# Patient Record
Sex: FEMALE | Race: BLACK OR AFRICAN AMERICAN | Employment: OTHER | URBAN - METROPOLITAN AREA
[De-identification: names, ages, dates, MRNs, and addresses within clinical notes are randomized per-mention and may not be internally consistent; named-entity substitution may affect disease eponyms.]

---

## 2017-02-13 ENCOUNTER — TELEPHONE (OUTPATIENT)
Dept: INTERNAL MEDICINE CLINIC | Facility: CLINIC | Age: 72
End: 2017-02-13

## 2017-02-13 NOTE — TELEPHONE ENCOUNTER
Pt is eligible for a Medicare Annual Health Assessment. Discussed in detail w/patient. Appt made for 3/22/17.

## 2017-03-22 ENCOUNTER — OFFICE VISIT (OUTPATIENT)
Dept: INTERNAL MEDICINE CLINIC | Facility: CLINIC | Age: 72
End: 2017-03-22

## 2017-03-22 VITALS
SYSTOLIC BLOOD PRESSURE: 126 MMHG | BODY MASS INDEX: 29.01 KG/M2 | DIASTOLIC BLOOD PRESSURE: 78 MMHG | HEIGHT: 67.5 IN | TEMPERATURE: 98 F | WEIGHT: 187 LBS | HEART RATE: 52 BPM

## 2017-03-22 DIAGNOSIS — N18.30 CKD (CHRONIC KIDNEY DISEASE) STAGE 3, GFR 30-59 ML/MIN (HCC): ICD-10-CM

## 2017-03-22 DIAGNOSIS — Z00.00 ANNUAL PHYSICAL EXAM: Primary | ICD-10-CM

## 2017-03-22 DIAGNOSIS — L85.3 DRY SKIN DERMATITIS: ICD-10-CM

## 2017-03-22 DIAGNOSIS — K21.9 GASTROESOPHAGEAL REFLUX DISEASE, ESOPHAGITIS PRESENCE NOT SPECIFIED: ICD-10-CM

## 2017-03-22 DIAGNOSIS — E78.5 HYPERLIPIDEMIA LDL GOAL <70: ICD-10-CM

## 2017-03-22 DIAGNOSIS — Z00.01 ENCOUNTER FOR GENERAL ADULT MEDICAL EXAMINATION WITH ABNORMAL FINDINGS: ICD-10-CM

## 2017-03-22 PROCEDURE — 90670 PCV13 VACCINE IM: CPT | Performed by: INTERNAL MEDICINE

## 2017-03-22 PROCEDURE — 96160 PT-FOCUSED HLTH RISK ASSMT: CPT | Performed by: INTERNAL MEDICINE

## 2017-03-22 PROCEDURE — G0438 PPPS, INITIAL VISIT: HCPCS | Performed by: INTERNAL MEDICINE

## 2017-03-22 PROCEDURE — G0009 ADMIN PNEUMOCOCCAL VACCINE: HCPCS | Performed by: INTERNAL MEDICINE

## 2017-03-22 NOTE — PROGRESS NOTES
HPI:    Patient ID: Yulissa Diehl is a 67year old female. The patient arrives for an annual wellness evaluation (Medicare). HPI  Hyperlipidemia  This is a chronic problem. The current episode started more than 1 year ago.  Lipid results: Lipid panel on syncope and speech difficulty. Psychiatric/Behavioral: Negative for confusion. The patient is not nervous/anxious.       HISTORY:  Past Medical History   Diagnosis Date   • Hyperlipidemia    • Colon polyp    • High cholesterol    • Deep vein thrombosis (H friction rub. No murmur heard. Pulmonary/Chest: Effort normal and breath sounds normal. No respiratory distress. She has no wheezes. She has no rales. Abdominal: Soft. Bowel sounds are normal. She exhibits no distension and no mass.  There is no tende diagnosis)  The patient arrives for an annual wellness evaluation (Medicare). The patient currently exercises 3-4 times per week and denies any CP or SOB on exertion.    Plan:   - Return in one year for annual wellness check.         (E78.5) Hyperlipidemia Cream        Meds This Visit:  Signed Prescriptions Disp Refills    triamcinolone acetonide 0.1 % External Cream 45 g 1      Sig: Apply topically 2 (two) times daily as needed.        Imaging & Referrals:  NEPHROLOGY - INTERNAL     TG#[id number]    [facility] issues?: 0-No    Fall/Risk Scorin    Scoring Interpretation: 0 - 3 No Risk     Depression Screening (PHQ-2/PHQ-9): Over the LAST 2 WEEKS   Little interest or pleasure in doing things (over the last two weeks)?: Not at all    Feeling down, depressed, or of the week is this?: Correct    What month is it?: Correct    What year is it?: Correct    Recall \"Ball\": Correct    Recall \"Flag\": Correct    Recall \"Tree\": Correct      Kendal Abrams's SCREENING SCHEDULE   Tests on this list are recommended by you Immunization Activity if applicable    Pneumococcal No orders found for this or any previous visit. Update Immunization Activity if applicable    Hepatitis B No orders found for this or any previous visit.  Update Immunization Activity if applicable    Teta 1:43 PM

## 2017-04-18 ENCOUNTER — APPOINTMENT (OUTPATIENT)
Dept: LAB | Facility: HOSPITAL | Age: 72
End: 2017-04-18
Attending: INTERNAL MEDICINE
Payer: MEDICARE

## 2017-04-18 DIAGNOSIS — E78.5 HYPERLIPIDEMIA LDL GOAL <70: ICD-10-CM

## 2017-04-18 PROCEDURE — 80061 LIPID PANEL: CPT

## 2017-04-18 PROCEDURE — 80053 COMPREHEN METABOLIC PANEL: CPT

## 2017-04-18 PROCEDURE — 36415 COLL VENOUS BLD VENIPUNCTURE: CPT

## 2017-04-24 ENCOUNTER — OFFICE VISIT (OUTPATIENT)
Dept: NEPHROLOGY | Facility: CLINIC | Age: 72
End: 2017-04-24

## 2017-04-24 VITALS
SYSTOLIC BLOOD PRESSURE: 111 MMHG | WEIGHT: 187 LBS | HEART RATE: 62 BPM | HEIGHT: 66.75 IN | BODY MASS INDEX: 29.35 KG/M2 | DIASTOLIC BLOOD PRESSURE: 62 MMHG

## 2017-04-24 DIAGNOSIS — N28.1 RENAL CYST: Primary | ICD-10-CM

## 2017-04-24 DIAGNOSIS — M85.80 OSTEOPENIA, UNSPECIFIED LOCATION: ICD-10-CM

## 2017-04-24 PROCEDURE — G0463 HOSPITAL OUTPT CLINIC VISIT: HCPCS | Performed by: INTERNAL MEDICINE

## 2017-04-24 PROCEDURE — 99204 OFFICE O/P NEW MOD 45 MIN: CPT | Performed by: INTERNAL MEDICINE

## 2017-04-24 NOTE — PATIENT INSTRUCTIONS
1.. Please do ultrasound of kidneys  In July and call me    2. Do blood test and urine the same day      3. . Don't worry about anything. You look great    4. Nice to meet you Kendal!

## 2017-04-25 NOTE — PROGRESS NOTES
NEPHROLOGY PROGRESS NOTE  Issac Moraes     MRN:  [id number]   Date of Service:  4/24/17     I had the pleasure of seeing Josephine Rodriguez in the office today. Thank you for the very kind referral. 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  She was in done of her kidneys in September 2016, liver has some hemangiomas, gallbladder looks okay, pancreas looks okay. She had two small cysts in her right kidney. Her left kidney had a large complex 5 x 5 cm parapelvic cyst. It appeared benign.      IMPRESSION:

## 2017-06-12 ENCOUNTER — TELEPHONE (OUTPATIENT)
Dept: INTERNAL MEDICINE CLINIC | Facility: CLINIC | Age: 72
End: 2017-06-12

## 2017-06-12 DIAGNOSIS — M20.41 HAMMERTOES OF BOTH FEET: Primary | ICD-10-CM

## 2017-06-12 DIAGNOSIS — M20.42 HAMMERTOES OF BOTH FEET: Primary | ICD-10-CM

## 2017-06-12 NOTE — TELEPHONE ENCOUNTER
Pt would like to see a podiatry dr.  Pt has jr and would like to get it straightened out.   Please advise

## 2017-06-26 ENCOUNTER — OFFICE VISIT (OUTPATIENT)
Dept: INTERNAL MEDICINE CLINIC | Facility: CLINIC | Age: 72
End: 2017-06-26

## 2017-06-26 VITALS
HEART RATE: 62 BPM | DIASTOLIC BLOOD PRESSURE: 72 MMHG | WEIGHT: 186.88 LBS | BODY MASS INDEX: 29 KG/M2 | TEMPERATURE: 98 F | SYSTOLIC BLOOD PRESSURE: 109 MMHG

## 2017-06-26 DIAGNOSIS — N89.8 VAGINAL DISCHARGE: Primary | ICD-10-CM

## 2017-06-26 DIAGNOSIS — L85.3 DRY SKIN DERMATITIS: ICD-10-CM

## 2017-06-26 PROCEDURE — 99214 OFFICE O/P EST MOD 30 MIN: CPT | Performed by: INTERNAL MEDICINE

## 2017-06-26 PROCEDURE — G0463 HOSPITAL OUTPT CLINIC VISIT: HCPCS | Performed by: INTERNAL MEDICINE

## 2017-06-26 NOTE — PROGRESS NOTES
HPI:    Patient ID: Kim Smith is a 67year old female. HPI     Patient presents to the clinic for swelling in the vaginal area. She states discomfort when drying herself and has not had any discharge nor is she sexually active.  This is the first occ mouth daily. Disp:  Rfl:    Omeprazole 40 MG Oral Capsule Delayed Release Take 40 mg by mouth daily. Disp:  Rfl:    Atorvastatin Calcium 20 MG Oral Tab Take 1 tablet (20 mg total) by mouth nightly.  Disp: 90 tablet Rfl: 3     Allergies:No Known Allergies described in this documentation. All medical record entries made by the scribe were at my direction and in my presence.   I have reviewed the chart and discharge instructions (if applicable) and agree that the record reflects my personal performance and is

## 2017-07-11 ENCOUNTER — OFFICE VISIT (OUTPATIENT)
Dept: PODIATRY CLINIC | Facility: CLINIC | Age: 72
End: 2017-07-11

## 2017-07-11 ENCOUNTER — HOSPITAL ENCOUNTER (OUTPATIENT)
Dept: GENERAL RADIOLOGY | Facility: HOSPITAL | Age: 72
Discharge: HOME | End: 2017-07-11
Attending: PODIATRIST
Payer: MEDICARE

## 2017-07-11 ENCOUNTER — HOSPITAL ENCOUNTER (OUTPATIENT)
Dept: ULTRASOUND IMAGING | Facility: HOSPITAL | Age: 72
Discharge: HOME | End: 2017-07-11
Attending: INTERNAL MEDICINE
Payer: MEDICARE

## 2017-07-11 ENCOUNTER — APPOINTMENT (OUTPATIENT)
Dept: LAB | Facility: HOSPITAL | Age: 72
End: 2017-07-11
Attending: INTERNAL MEDICINE
Payer: MEDICARE

## 2017-07-11 DIAGNOSIS — M85.80 OSTEOPENIA, UNSPECIFIED LOCATION: ICD-10-CM

## 2017-07-11 DIAGNOSIS — M20.41 HAMMER TOE OF RIGHT FOOT: ICD-10-CM

## 2017-07-11 DIAGNOSIS — M20.41 HAMMER TOE OF RIGHT FOOT: Primary | ICD-10-CM

## 2017-07-11 DIAGNOSIS — N28.1 RENAL CYST: ICD-10-CM

## 2017-07-11 LAB
ANION GAP SERPL CALC-SCNC: 5 MMOL/L (ref 0–18)
BASOPHILS # BLD: 0 K/UL (ref 0–0.2)
BASOPHILS NFR BLD: 1 %
BILIRUB UR QL: NEGATIVE
BUN SERPL-MCNC: 17 MG/DL (ref 8–20)
BUN/CREAT SERPL: 13.3 (ref 10–20)
CALCIUM SERPL-MCNC: 9 MG/DL (ref 8.5–10.5)
CHLORIDE SERPL-SCNC: 109 MMOL/L (ref 95–110)
CO2 SERPL-SCNC: 26 MMOL/L (ref 22–32)
COLOR UR: YELLOW
CREAT SERPL-MCNC: 1.28 MG/DL (ref 0.5–1.5)
CREAT UR-MCNC: 349.8 MG/DL
EOSINOPHIL # BLD: 0.1 K/UL (ref 0–0.7)
EOSINOPHIL NFR BLD: 2 %
ERYTHROCYTE [DISTWIDTH] IN BLOOD BY AUTOMATED COUNT: 13.3 % (ref 11–15)
GLUCOSE SERPL-MCNC: 90 MG/DL (ref 70–99)
GLUCOSE UR-MCNC: NEGATIVE MG/DL
HCT VFR BLD AUTO: 38.8 % (ref 35–48)
HGB BLD-MCNC: 12.9 G/DL (ref 12–16)
KETONES UR-MCNC: NEGATIVE MG/DL
LYMPHOCYTES # BLD: 1.1 K/UL (ref 1–4)
LYMPHOCYTES NFR BLD: 37 %
MCH RBC QN AUTO: 30.5 PG (ref 27–32)
MCHC RBC AUTO-ENTMCNC: 33.3 G/DL (ref 32–37)
MCV RBC AUTO: 91.7 FL (ref 80–100)
MICROALBUMIN UR-MCNC: 5 MG/DL (ref 0–1.8)
MICROALBUMIN/CREAT UR: 14.3 MG/G{CREAT} (ref 0–20)
MONOCYTES # BLD: 0.3 K/UL (ref 0–1)
MONOCYTES NFR BLD: 9 %
NEUTROPHILS # BLD AUTO: 1.6 K/UL (ref 1.8–7.7)
NEUTROPHILS NFR BLD: 51 %
NITRITE UR QL STRIP.AUTO: NEGATIVE
OSMOLALITY UR CALC.SUM OF ELEC: 291 MOSM/KG (ref 275–295)
PH UR: 5 [PH] (ref 5–8)
PLATELET # BLD AUTO: 190 K/UL (ref 140–400)
PMV BLD AUTO: 8.6 FL (ref 7.4–10.3)
POTASSIUM SERPL-SCNC: 4.6 MMOL/L (ref 3.3–5.1)
PROT UR-MCNC: 30 MG/DL
RBC # BLD AUTO: 4.23 M/UL (ref 3.7–5.4)
RBC #/AREA URNS AUTO: 9 /HPF
SODIUM SERPL-SCNC: 140 MMOL/L (ref 136–144)
SP GR UR STRIP: 1.03 (ref 1–1.03)
UROBILINOGEN UR STRIP-ACNC: 2
VIT C UR-MCNC: NEGATIVE MG/DL
WBC # BLD AUTO: 3.1 K/UL (ref 4–11)
WBC #/AREA URNS AUTO: 66 /HPF

## 2017-07-11 PROCEDURE — 81001 URINALYSIS AUTO W/SCOPE: CPT | Performed by: INTERNAL MEDICINE

## 2017-07-11 PROCEDURE — 99213 OFFICE O/P EST LOW 20 MIN: CPT | Performed by: PODIATRIST

## 2017-07-11 PROCEDURE — G0463 HOSPITAL OUTPT CLINIC VISIT: HCPCS | Performed by: PODIATRIST

## 2017-07-11 PROCEDURE — 82306 VITAMIN D 25 HYDROXY: CPT

## 2017-07-11 PROCEDURE — 85025 COMPLETE CBC W/AUTO DIFF WBC: CPT | Performed by: INTERNAL MEDICINE

## 2017-07-11 PROCEDURE — 73630 X-RAY EXAM OF FOOT: CPT | Performed by: PODIATRIST

## 2017-07-11 PROCEDURE — 80048 BASIC METABOLIC PNL TOTAL CA: CPT | Performed by: INTERNAL MEDICINE

## 2017-07-11 PROCEDURE — 82570 ASSAY OF URINE CREATININE: CPT

## 2017-07-11 PROCEDURE — 82043 UR ALBUMIN QUANTITATIVE: CPT

## 2017-07-11 PROCEDURE — 76770 US EXAM ABDO BACK WALL COMP: CPT | Performed by: INTERNAL MEDICINE

## 2017-07-11 PROCEDURE — 36415 COLL VENOUS BLD VENIPUNCTURE: CPT

## 2017-07-12 ENCOUNTER — TELEPHONE (OUTPATIENT)
Dept: PODIATRY CLINIC | Facility: CLINIC | Age: 72
End: 2017-07-12

## 2017-07-12 DIAGNOSIS — M20.41 HAMMER TOE OF RIGHT FOOT: Primary | ICD-10-CM

## 2017-07-12 LAB — 25(OH)D3 SERPL-MCNC: 17.2 NG/ML

## 2017-07-12 NOTE — TELEPHONE ENCOUNTER
Spoke to pt and scheduled surgery with SCR at [facility] for 08/04/17.    Informed pt that Casi be calling pt 1-2 days prior to surgery to discuss the following:  - Time of surgery and when to be there  - Medications and allergies  - Medical History and Surgi

## 2017-08-10 ENCOUNTER — TELEPHONE (OUTPATIENT)
Dept: NEPHROLOGY | Facility: CLINIC | Age: 72
End: 2017-08-10

## 2017-08-11 NOTE — TELEPHONE ENCOUNTER
notifed pt of stable cysts in kidney and stable labs   She is to see me in jan 2017  And do labs December. .   As her vaginal problem is not better.  Recommended David Bauer of obgyn to evaluate     Discussed all with her on phone  She has ckd3   thanks fo

## 2017-08-14 ENCOUNTER — OFFICE VISIT (OUTPATIENT)
Dept: PODIATRY CLINIC | Facility: CLINIC | Age: 72
End: 2017-08-14

## 2017-08-14 DIAGNOSIS — M20.41 HAMMER TOE OF RIGHT FOOT: Primary | ICD-10-CM

## 2017-08-14 PROCEDURE — 99024 POSTOP FOLLOW-UP VISIT: CPT | Performed by: PODIATRIST

## 2017-08-14 PROCEDURE — G0463 HOSPITAL OUTPT CLINIC VISIT: HCPCS | Performed by: PODIATRIST

## 2017-08-14 RX ORDER — MELATONIN
1000 DAILY
COMMUNITY

## 2017-08-14 RX ORDER — ACETAMINOPHEN AND CODEINE PHOSPHATE 300; 30 MG/1; MG/1
TABLET ORAL
Refills: 0 | COMMUNITY
Start: 2017-08-04 | End: 2017-08-21

## 2017-08-14 NOTE — PROGRESS NOTES
HPI:    Patient ID: Sparkle Villalobos is a 67year old female. HPI  76-year-old female presents approximately 1 week post hammertoe surgery with no specific noted complaints.   Review of Systems         Current Outpatient Prescriptions:  Vitamin D3 1000 unit

## 2017-08-21 ENCOUNTER — OFFICE VISIT (OUTPATIENT)
Dept: PODIATRY CLINIC | Facility: CLINIC | Age: 72
End: 2017-08-21

## 2017-08-21 ENCOUNTER — HOSPITAL ENCOUNTER (OUTPATIENT)
Dept: GENERAL RADIOLOGY | Facility: HOSPITAL | Age: 72
Discharge: HOME | End: 2017-08-21
Attending: PODIATRIST
Payer: MEDICARE

## 2017-08-21 DIAGNOSIS — Z47.89 ORTHOPEDIC AFTERCARE: Primary | ICD-10-CM

## 2017-08-21 DIAGNOSIS — Z47.89 ORTHOPEDIC AFTERCARE: ICD-10-CM

## 2017-08-21 DIAGNOSIS — M20.41 HAMMER TOE OF RIGHT FOOT: ICD-10-CM

## 2017-08-21 PROCEDURE — G0463 HOSPITAL OUTPT CLINIC VISIT: HCPCS | Performed by: PODIATRIST

## 2017-08-21 PROCEDURE — 73630 X-RAY EXAM OF FOOT: CPT | Performed by: PODIATRIST

## 2017-08-21 PROCEDURE — 99024 POSTOP FOLLOW-UP VISIT: CPT | Performed by: PODIATRIST

## 2017-08-21 NOTE — PROGRESS NOTES
HPI:    Patient ID: Princess Vasquez is a 67year old female. HPI  This 79-year-old female presents 2 weeks post right forefoot surgery with no specific noted complaints or concerns.   Review of Systems         Current Outpatient Prescriptions:  Vitamin D3

## 2017-08-23 ENCOUNTER — OFFICE VISIT (OUTPATIENT)
Dept: OBGYN CLINIC | Facility: CLINIC | Age: 72
End: 2017-08-23

## 2017-08-23 VITALS — DIASTOLIC BLOOD PRESSURE: 70 MMHG | SYSTOLIC BLOOD PRESSURE: 115 MMHG | BODY MASS INDEX: 29 KG/M2 | WEIGHT: 185 LBS

## 2017-08-23 DIAGNOSIS — N90.89 VULVAR IRRITATION: Primary | ICD-10-CM

## 2017-08-23 DIAGNOSIS — Z12.31 VISIT FOR SCREENING MAMMOGRAM: ICD-10-CM

## 2017-08-23 PROCEDURE — 99202 OFFICE O/P NEW SF 15 MIN: CPT | Performed by: ADVANCED PRACTICE MIDWIFE

## 2017-08-23 NOTE — PROGRESS NOTES
HPI:    Patient ID: Samantha Dodd is a 67year old female. For over a month has had a very dry labia. Sometime some discomfort but mostly the dryness is the problem. Denies rash, vaginal discharge. Is not sexually active.   Has not done anything to tr Recommend nothing but water to perineal area, no soap, perfumes or scrubbing. To use cotton underwear only without dyed fabric. 3.  Thin coat of Coconut oil (not lotion with perfumes) to area twice daily  4.   If no relief in 1 month RTC for biopsy    O

## 2017-08-25 DIAGNOSIS — B37.9 YEAST INFECTION: Primary | ICD-10-CM

## 2017-08-25 LAB
GENITAL VAGINOSIS SCREEN: NEGATIVE
TRICHOMONAS SCREEN: NEGATIVE

## 2017-08-28 ENCOUNTER — TELEPHONE (OUTPATIENT)
Dept: OBGYN CLINIC | Facility: CLINIC | Age: 72
End: 2017-08-28

## 2017-08-28 ENCOUNTER — OFFICE VISIT (OUTPATIENT)
Dept: PODIATRY CLINIC | Facility: CLINIC | Age: 72
End: 2017-08-28

## 2017-08-28 DIAGNOSIS — B37.9 YEAST INFECTION: ICD-10-CM

## 2017-08-28 DIAGNOSIS — M20.41 HAMMER TOE OF RIGHT FOOT: Primary | ICD-10-CM

## 2017-08-28 PROCEDURE — 99024 POSTOP FOLLOW-UP VISIT: CPT | Performed by: PODIATRIST

## 2017-08-28 PROCEDURE — G0463 HOSPITAL OUTPT CLINIC VISIT: HCPCS | Performed by: PODIATRIST

## 2017-08-28 NOTE — TELEPHONE ENCOUNTER
----- Message from SUPA Pozo sent at 8/25/2017  1:01 PM CDT -----  Please let this patient know that she has a vaginal yeast infection and this is probably what is causing her external itching as well.   I will send an order for terazol 7 tell her

## 2017-08-28 NOTE — PROGRESS NOTES
HPI:    Patient ID: Mitchell Saleh is a 67year old female. HPI  This 76-year-old female presents 1 month post hammertoe surgery right. She is having no specific complaints or noted concerns.   Review of Systems         Current Outpatient Prescriptions:

## 2017-09-25 ENCOUNTER — OFFICE VISIT (OUTPATIENT)
Dept: PODIATRY CLINIC | Facility: CLINIC | Age: 72
End: 2017-09-25

## 2017-09-25 DIAGNOSIS — M20.41 HAMMER TOE OF RIGHT FOOT: Primary | ICD-10-CM

## 2017-09-25 PROCEDURE — 99024 POSTOP FOLLOW-UP VISIT: CPT | Performed by: PODIATRIST

## 2017-09-25 PROCEDURE — G0463 HOSPITAL OUTPT CLINIC VISIT: HCPCS | Performed by: PODIATRIST

## 2017-09-25 NOTE — PROGRESS NOTES
HPI:    Patient ID: Dima Ruby is a 67year old female. HPI  59-year-old female presents postsurgical with no noted complaints. She is wearing closed shoes and is returned to normal weightbearing activities without restriction.   No further care as n

## 2017-10-10 ENCOUNTER — TELEPHONE (OUTPATIENT)
Dept: INTERNAL MEDICINE CLINIC | Facility: CLINIC | Age: 72
End: 2017-10-10

## 2017-10-10 DIAGNOSIS — Z12.39 BREAST CANCER SCREENING: Primary | ICD-10-CM

## 2017-10-10 NOTE — TELEPHONE ENCOUNTER
Patient need order for mammogram.  Call when ready for      Patient has apt next wed  At [facility]

## 2017-11-05 PROBLEM — N90.89 VULVAR IRRITATION: Status: RESOLVED | Noted: 2017-08-23 | Resolved: 2017-11-05

## 2017-11-07 ENCOUNTER — OFFICE VISIT (OUTPATIENT)
Dept: INTERNAL MEDICINE CLINIC | Facility: CLINIC | Age: 72
End: 2017-11-07

## 2017-11-07 VITALS
HEIGHT: 66.75 IN | SYSTOLIC BLOOD PRESSURE: 117 MMHG | WEIGHT: 188 LBS | DIASTOLIC BLOOD PRESSURE: 70 MMHG | BODY MASS INDEX: 29.51 KG/M2 | HEART RATE: 62 BPM

## 2017-11-07 DIAGNOSIS — Z86.010 HISTORY OF COLON POLYPS: ICD-10-CM

## 2017-11-07 DIAGNOSIS — E78.00 HYPERCHOLESTEROLEMIA: Primary | ICD-10-CM

## 2017-11-07 DIAGNOSIS — K21.9 GASTROESOPHAGEAL REFLUX DISEASE WITHOUT ESOPHAGITIS: ICD-10-CM

## 2017-11-07 PROCEDURE — G0008 ADMIN INFLUENZA VIRUS VAC: HCPCS | Performed by: INTERNAL MEDICINE

## 2017-11-07 PROCEDURE — 90653 IIV ADJUVANT VACCINE IM: CPT | Performed by: INTERNAL MEDICINE

## 2017-11-07 PROCEDURE — G0463 HOSPITAL OUTPT CLINIC VISIT: HCPCS | Performed by: INTERNAL MEDICINE

## 2017-11-07 PROCEDURE — 99214 OFFICE O/P EST MOD 30 MIN: CPT | Performed by: INTERNAL MEDICINE

## 2017-11-07 NOTE — H&P
Lida Slade is a 67year old female who presents this morning to establish ongoing primary care. HPI:   Previous PCP was Dr. Raysa Valdez, who recently left his practice. She feels well today. She does request a high-dose flu shot today.   No other speci III (moderate)    • Deep vein thrombosis (HCC) 2000s    RLE, treated with Coumadin for 6 months   • GERD (gastroesophageal reflux disease)     EGD 11-16 with hiatal hernia and gastritis with biopsies benign and H. pylori testing negative   • History of col auscultation  CARDIAC: Rhythm regular S1 S2 normal without murmur or edema  ABDOMEN: Bowel sounds normal soft nontender without mass or hepatosplenomegaly  EXTREMITIES: Normal without cyanosis or clubbing  PULSES: Carotid upstrokes 2+ without carotid bruit

## 2017-11-07 NOTE — PATIENT INSTRUCTIONS
You received a flu shot today. Please resume taking atorvastatin 20 mg daily.   Return visit in March for recheck, physical, labs and second Pneumococcal vaccine

## 2017-12-29 ENCOUNTER — OFFICE VISIT (OUTPATIENT)
Dept: INTERNAL MEDICINE CLINIC | Facility: CLINIC | Age: 72
End: 2017-12-29

## 2017-12-29 VITALS
DIASTOLIC BLOOD PRESSURE: 62 MMHG | HEIGHT: 67 IN | SYSTOLIC BLOOD PRESSURE: 110 MMHG | HEART RATE: 72 BPM | RESPIRATION RATE: 16 BRPM | WEIGHT: 187.13 LBS | TEMPERATURE: 97 F | BODY MASS INDEX: 29.37 KG/M2

## 2017-12-29 DIAGNOSIS — J06.9 UPPER RESPIRATORY TRACT INFECTION, UNSPECIFIED TYPE: Primary | ICD-10-CM

## 2017-12-29 PROCEDURE — 99213 OFFICE O/P EST LOW 20 MIN: CPT | Performed by: PHYSICIAN ASSISTANT

## 2017-12-29 RX ORDER — FLUTICASONE PROPIONATE 50 MCG
2 SPRAY, SUSPENSION (ML) NASAL DAILY
Qty: 1 BOTTLE | Refills: 1 | Status: SHIPPED | OUTPATIENT
Start: 2017-12-29 | End: 2018-03-05 | Stop reason: ALTCHOICE

## 2017-12-29 RX ORDER — AZITHROMYCIN 250 MG/1
TABLET, FILM COATED ORAL
Qty: 6 TABLET | Refills: 0 | Status: SHIPPED | OUTPATIENT
Start: 2017-12-29 | End: 2018-03-05 | Stop reason: ALTCHOICE

## 2017-12-29 NOTE — PATIENT INSTRUCTIONS
Azithromycin - take two tablets on the first day, then one tablet daily until finished  Flonase - 2 sprays in each nostril once daily until symptoms improve then can stop use  Mucinex or Robitussin as needed for cough and congestion  Tylenol every 4-6 hour

## 2017-12-29 NOTE — PROGRESS NOTES
Mitchell Saleh is a 67year old female. Patient presents with:  Cough  Night Sweats      HPI:   Patient presents today complaining of upper respiratory symptoms for over 1 month.   Symptoms have been waxing and waning since onset but worse in the last sever 2014    [facility], [facility]   • Egd N/A 11/17/2016    Procedure: ESOPHAGOGASTRODUODENOSCOPY (EGD);   Surgeon: Aravind Issa MD;  Location: [address] [place] [address] ENDOSCOPY   • Hemorrhoidectomy  1980s   • Knee arthroscopy Right 1997   • Other  1995    Left salp wheezes, rales, or rhonchi. CARDIO: Regular rate and rhythm. No murmur, rubs, or gallops. No edema. NUERO: Normal mood and affect.      ASSESSMENT AND PLAN:   Diagnoses and all orders for this visit:    Upper respiratory tract infection, unspecified type

## 2018-01-03 ENCOUNTER — TELEPHONE (OUTPATIENT)
Dept: INTERNAL MEDICINE CLINIC | Facility: CLINIC | Age: 73
End: 2018-01-03

## 2018-01-03 DIAGNOSIS — H52.209 ASTIGMATISM, UNSPECIFIED LATERALITY, UNSPECIFIED TYPE: Primary | ICD-10-CM

## 2018-01-03 NOTE — TELEPHONE ENCOUNTER
Pt called and requested a referral to see a Dr for her annual eye exam.  She requested a referral to go to a [facility].    I explained to her to use her vision insurance for that type of request.  She requested the referral to be placed to use her m

## 2018-01-06 ENCOUNTER — APPOINTMENT (OUTPATIENT)
Dept: GENERAL RADIOLOGY | Facility: HOSPITAL | Age: 73
End: 2018-01-06
Attending: EMERGENCY MEDICINE
Payer: MEDICARE

## 2018-01-06 ENCOUNTER — HOSPITAL ENCOUNTER (EMERGENCY)
Facility: HOSPITAL | Age: 73
Discharge: HOME | End: 2018-01-06
Attending: EMERGENCY MEDICINE
Payer: MEDICARE

## 2018-01-06 VITALS
OXYGEN SATURATION: 99 % | SYSTOLIC BLOOD PRESSURE: 116 MMHG | HEART RATE: 51 BPM | DIASTOLIC BLOOD PRESSURE: 63 MMHG | BODY MASS INDEX: 28.54 KG/M2 | HEIGHT: 67.5 IN | WEIGHT: 184 LBS | TEMPERATURE: 97 F | RESPIRATION RATE: 20 BRPM

## 2018-01-06 DIAGNOSIS — R19.7 DIARRHEA, UNSPECIFIED TYPE: Primary | ICD-10-CM

## 2018-01-06 LAB
ALBUMIN SERPL BCP-MCNC: 3.8 G/DL (ref 3.5–4.8)
ALP SERPL-CCNC: 47 U/L (ref 32–100)
ANION GAP SERPL CALC-SCNC: 9 MMOL/L (ref 0–18)
BASOPHILS # BLD: 0 K/UL (ref 0–0.2)
BASOPHILS NFR BLD: 1 %
BILIRUB UR QL: NEGATIVE
BUN SERPL-MCNC: 19 MG/DL (ref 8–20)
BUN/CREAT SERPL: 15 (ref 10–20)
CALCIUM SERPL-MCNC: 8.5 MG/DL (ref 8.5–10.5)
CHLORIDE SERPL-SCNC: 106 MMOL/L (ref 95–110)
CO2 SERPL-SCNC: 24 MMOL/L (ref 22–32)
COLOR UR: YELLOW
CREAT SERPL-MCNC: 1.27 MG/DL (ref 0.5–1.5)
EOSINOPHIL # BLD: 0.1 K/UL (ref 0–0.7)
EOSINOPHIL NFR BLD: 3 %
ERYTHROCYTE [DISTWIDTH] IN BLOOD BY AUTOMATED COUNT: 13.4 % (ref 11–15)
GLUCOSE SERPL-MCNC: 101 MG/DL (ref 70–99)
GLUCOSE UR-MCNC: NEGATIVE MG/DL
HCT VFR BLD AUTO: 40.9 % (ref 35–48)
HGB BLD-MCNC: 13.5 G/DL (ref 12–16)
HGB UR QL STRIP.AUTO: NEGATIVE
KETONES UR-MCNC: NEGATIVE MG/DL
LEUKOCYTE ESTERASE UR QL STRIP.AUTO: NEGATIVE
LYMPHOCYTES # BLD: 1.3 K/UL (ref 1–4)
LYMPHOCYTES NFR BLD: 41 %
MCH RBC QN AUTO: 30.1 PG (ref 27–32)
MCHC RBC AUTO-ENTMCNC: 33 G/DL (ref 32–37)
MCV RBC AUTO: 91.1 FL (ref 80–100)
MONOCYTES # BLD: 0.3 K/UL (ref 0–1)
MONOCYTES NFR BLD: 10 %
NEUTROPHILS # BLD AUTO: 1.5 K/UL (ref 1.8–7.7)
NEUTROPHILS NFR BLD: 45 %
NITRITE UR QL STRIP.AUTO: NEGATIVE
OSMOLALITY UR CALC.SUM OF ELEC: 290 MOSM/KG (ref 275–295)
PH UR: 5 [PH] (ref 5–8)
PLATELET # BLD AUTO: 212 K/UL (ref 140–400)
PMV BLD AUTO: 8.6 FL (ref 7.4–10.3)
PROT SERPL-MCNC: 7.2 G/DL (ref 5.9–8.4)
PROT UR-MCNC: NEGATIVE MG/DL
RBC # BLD AUTO: 4.49 M/UL (ref 3.7–5.4)
RBC #/AREA URNS AUTO: 6 /HPF
SODIUM SERPL-SCNC: 139 MMOL/L (ref 136–144)
SP GR UR STRIP: 1.03 (ref 1–1.03)
UROBILINOGEN UR STRIP-ACNC: 2
VIT C UR-MCNC: 40 MG/DL
WBC # BLD AUTO: 3.2 K/UL (ref 4–11)
WBC #/AREA URNS AUTO: 3 /HPF

## 2018-01-06 PROCEDURE — 80076 HEPATIC FUNCTION PANEL: CPT | Performed by: EMERGENCY MEDICINE

## 2018-01-06 PROCEDURE — 96360 HYDRATION IV INFUSION INIT: CPT

## 2018-01-06 PROCEDURE — 99284 EMERGENCY DEPT VISIT MOD MDM: CPT

## 2018-01-06 PROCEDURE — 80048 BASIC METABOLIC PNL TOTAL CA: CPT | Performed by: EMERGENCY MEDICINE

## 2018-01-06 PROCEDURE — 85025 COMPLETE CBC W/AUTO DIFF WBC: CPT | Performed by: EMERGENCY MEDICINE

## 2018-01-06 PROCEDURE — 71046 X-RAY EXAM CHEST 2 VIEWS: CPT | Performed by: EMERGENCY MEDICINE

## 2018-01-06 PROCEDURE — 81003 URINALYSIS AUTO W/O SCOPE: CPT | Performed by: EMERGENCY MEDICINE

## 2018-01-06 NOTE — ED INITIAL ASSESSMENT (HPI)
Pt was seen by pmd over one wk ago for c/o cough and generalized weakness. Denies any fever. Was given an abx. For the past couple days c/o diarrhea and sore throat.

## 2018-01-06 NOTE — ED PROVIDER NOTES
Patient Seen in: [facility] Emergency Department    History   Patient presents with:  Nausea/Vomiting/Diarrhea (gastrointestinal)  Sore Throat    Stated Complaint: sore throat, diarrhe    HPI    Patient is a 53-year-old female who states she has h 51  Resp: 20  Temp: (!) 97.2 °F (36.2 °C)  Temp src: Oral  SpO2: 99 %  O2 Device: None (Room air)    Current:/63   Pulse 51   Temp (!) 97.2 °F (36.2 °C) (Oral)   Resp 20   Ht 171.5 cm (5' 7.5\")   Wt 83.5 kg   SpO2 99%   BMI 28.39 kg/m²         Physi DIFFERENTIAL - Abnormal; Notable for the following:     WBC 3.2 (*)     Neutrophil Absolute 1.5 (*)     All other components within normal limits   CBC WITH DIFFERENTIAL WITH PLATELET    Narrative:      The following orders were created for panel order CBC

## 2018-01-16 ENCOUNTER — TELEPHONE (OUTPATIENT)
Dept: INTERNAL MEDICINE CLINIC | Facility: CLINIC | Age: 73
End: 2018-01-16

## 2018-01-16 NOTE — TELEPHONE ENCOUNTER
Patient informed is due for Medicare Advantage Annual Wellness Visit, states will call back to schedule.

## 2018-01-23 ENCOUNTER — TELEPHONE (OUTPATIENT)
Dept: OPHTHALMOLOGY | Facility: CLINIC | Age: 73
End: 2018-01-23

## 2018-01-23 NOTE — TELEPHONE ENCOUNTER
Referral on file. OK to book EE with Dr. Heidy Man or Dr. Castro Gordon.  Please call pt back to set up NP/ EE.

## 2018-01-24 ENCOUNTER — TELEPHONE (OUTPATIENT)
Dept: INTERNAL MEDICINE CLINIC | Facility: CLINIC | Age: 73
End: 2018-01-24

## 2018-01-24 DIAGNOSIS — Z00.00 PHYSICAL EXAM: Primary | ICD-10-CM

## 2018-01-24 NOTE — TELEPHONE ENCOUNTER
Pt called in to schedule px appt (scheduled for 3/5/18). Pt states that she would like lab work added to her chart prior to [address] so she can have it drawn. Please advise.

## 2018-01-26 NOTE — TELEPHONE ENCOUNTER
LMTCB.  When pt calls back pls inform her that labs have been generated, and to fast for 12 hrs prior to having labs draw.   Water is ok to drink

## 2018-02-27 ENCOUNTER — OFFICE VISIT (OUTPATIENT)
Dept: OPHTHALMOLOGY | Facility: CLINIC | Age: 73
End: 2018-02-27

## 2018-02-27 DIAGNOSIS — Z83.511 FAMILY HISTORY OF GLAUCOMA IN MOTHER: ICD-10-CM

## 2018-02-27 DIAGNOSIS — Z83.511 FAMILY HISTORY OF GLAUCOMA IN SISTER: ICD-10-CM

## 2018-02-27 DIAGNOSIS — H25.13 AGE-RELATED NUCLEAR CATARACT OF BOTH EYES: Primary | ICD-10-CM

## 2018-02-27 PROCEDURE — 92004 COMPRE OPH EXAM NEW PT 1/>: CPT | Performed by: OPHTHALMOLOGY

## 2018-02-27 PROCEDURE — 92015 DETERMINE REFRACTIVE STATE: CPT | Performed by: OPHTHALMOLOGY

## 2018-02-27 NOTE — PATIENT INSTRUCTIONS
Age-related nuclear cataract of both eyes  Discussed with patient that cataracts are the cause of her decreased vision.     Options:    1.) Update glasses  2.) Refer to [address] [place] [address] ophthalmology for evaluation and possible surgery     Patient opts to update the

## 2018-02-27 NOTE — ASSESSMENT & PLAN NOTE
Discussed with patient that cataracts are the cause of her decreased vision.     Options:    1.) Update glasses  2.) Refer to [address] [place] [address] ophthalmology for evaluation and possible surgery     Patient opts to update the glasses at this time and will call the [facility] [place]

## 2018-02-27 NOTE — ASSESSMENT & PLAN NOTE
Patient's mother and sister both has history of glaucoma. There is no evidence of glaucoma in this patient at this time.

## 2018-02-27 NOTE — PROGRESS NOTES
Katelyn Salinas is a 75year old female. HPI:     HPI     Consult    Additional comments: Per PCP Dr. Kristie Ceja   NP. Pt complains of blurred vision at near when reading fine print.  Pt states that her last exam was over a year ago and wou Disp: 6 tablet Rfl: 0   Fluticasone Propionate 50 MCG/ACT Nasal Suspension 2 sprays by Each Nare route daily. Disp: 1 Bottle Rfl: 1   Vitamin D3 1000 units Oral Tab Take 1,000 Units by mouth daily.  Disp:  Rfl:    triamcinolone acetonide 0.1 % External Crea Disc Good rim Good rim    C/D Ratio 0.3 0.3    Macula Normal Normal    Vessels Normal Normal    Periphery Normal Normal            Refraction     Wearing Rx       Sphere Cylinder Axis Add    Right +1.50 +0.00 000 2.50    Left +1.25 +0.75 060 2.50    Type:

## 2018-03-05 ENCOUNTER — OFFICE VISIT (OUTPATIENT)
Dept: INTERNAL MEDICINE CLINIC | Facility: CLINIC | Age: 73
End: 2018-03-05

## 2018-03-05 ENCOUNTER — APPOINTMENT (OUTPATIENT)
Dept: LAB | Facility: HOSPITAL | Age: 73
End: 2018-03-05
Attending: INTERNAL MEDICINE
Payer: MEDICARE

## 2018-03-05 VITALS
HEART RATE: 62 BPM | BODY MASS INDEX: 29.66 KG/M2 | DIASTOLIC BLOOD PRESSURE: 72 MMHG | WEIGHT: 189 LBS | HEIGHT: 67 IN | SYSTOLIC BLOOD PRESSURE: 124 MMHG

## 2018-03-05 DIAGNOSIS — Z86.010 HISTORY OF COLON POLYPS: ICD-10-CM

## 2018-03-05 DIAGNOSIS — K21.9 GASTROESOPHAGEAL REFLUX DISEASE WITHOUT ESOPHAGITIS: ICD-10-CM

## 2018-03-05 DIAGNOSIS — Z00.00 ANNUAL PHYSICAL EXAM: Primary | ICD-10-CM

## 2018-03-05 DIAGNOSIS — Z00.00 ENCOUNTER FOR ANNUAL HEALTH EXAMINATION: ICD-10-CM

## 2018-03-05 DIAGNOSIS — E78.00 HYPERCHOLESTEROLEMIA: ICD-10-CM

## 2018-03-05 DIAGNOSIS — I70.0 AORTIC ATHEROSCLEROSIS (HCC): ICD-10-CM

## 2018-03-05 DIAGNOSIS — Z00.00 PHYSICAL EXAM: ICD-10-CM

## 2018-03-05 DIAGNOSIS — H25.13 AGE-RELATED NUCLEAR CATARACT OF BOTH EYES: ICD-10-CM

## 2018-03-05 DIAGNOSIS — N18.30 CHRONIC KIDNEY DISEASE, STAGE III (MODERATE) (HCC): ICD-10-CM

## 2018-03-05 DIAGNOSIS — Z80.0 FAMILY HISTORY OF COLON CANCER: ICD-10-CM

## 2018-03-05 PROBLEM — Z83.511 FAMILY HISTORY OF GLAUCOMA IN SISTER: Status: RESOLVED | Noted: 2018-02-27 | Resolved: 2018-03-05

## 2018-03-05 LAB
ALBUMIN SERPL BCP-MCNC: 4.1 G/DL (ref 3.5–4.8)
ALBUMIN/GLOB SERPL: 1.2 {RATIO} (ref 1–2)
ALP SERPL-CCNC: 46 U/L (ref 32–100)
ALT SERPL-CCNC: 20 U/L (ref 14–54)
ANION GAP SERPL CALC-SCNC: 8 MMOL/L (ref 0–18)
AST SERPL-CCNC: 24 U/L (ref 15–41)
BILIRUB SERPL-MCNC: 1 MG/DL (ref 0.3–1.2)
BUN SERPL-MCNC: 12 MG/DL (ref 8–20)
BUN/CREAT SERPL: 11.2 (ref 10–20)
CALCIUM SERPL-MCNC: 8.8 MG/DL (ref 8.5–10.5)
CHLORIDE SERPL-SCNC: 104 MMOL/L (ref 95–110)
CHOLEST SERPL-MCNC: 182 MG/DL (ref 110–200)
CO2 SERPL-SCNC: 27 MMOL/L (ref 22–32)
CREAT SERPL-MCNC: 1.07 MG/DL (ref 0.5–1.5)
GLOBULIN PLAS-MCNC: 3.4 G/DL (ref 2.5–3.7)
GLUCOSE SERPL-MCNC: 95 MG/DL (ref 70–99)
HDLC SERPL-MCNC: 70 MG/DL
LDLC SERPL CALC-MCNC: 100 MG/DL (ref 0–99)
NONHDLC SERPL-MCNC: 112 MG/DL
OSMOLALITY UR CALC.SUM OF ELEC: 288 MOSM/KG (ref 275–295)
PATIENT FASTING: YES
POTASSIUM SERPL-SCNC: 4.1 MMOL/L (ref 3.3–5.1)
PROT SERPL-MCNC: 7.5 G/DL (ref 5.9–8.4)
SODIUM SERPL-SCNC: 139 MMOL/L (ref 136–144)
TRIGL SERPL-MCNC: 60 MG/DL (ref 1–149)

## 2018-03-05 PROCEDURE — G0009 ADMIN PNEUMOCOCCAL VACCINE: HCPCS | Performed by: INTERNAL MEDICINE

## 2018-03-05 PROCEDURE — 36415 COLL VENOUS BLD VENIPUNCTURE: CPT

## 2018-03-05 PROCEDURE — 96160 PT-FOCUSED HLTH RISK ASSMT: CPT | Performed by: INTERNAL MEDICINE

## 2018-03-05 PROCEDURE — G0439 PPPS, SUBSEQ VISIT: HCPCS | Performed by: INTERNAL MEDICINE

## 2018-03-05 PROCEDURE — 80061 LIPID PANEL: CPT

## 2018-03-05 PROCEDURE — 90732 PPSV23 VACC 2 YRS+ SUBQ/IM: CPT | Performed by: INTERNAL MEDICINE

## 2018-03-05 PROCEDURE — 80053 COMPREHEN METABOLIC PANEL: CPT

## 2018-03-05 RX ORDER — ATORVASTATIN CALCIUM 20 MG/1
20 TABLET, FILM COATED ORAL NIGHTLY
Qty: 90 TABLET | Refills: 3 | Status: SHIPPED | OUTPATIENT
Start: 2018-03-05 | End: 2019-01-23

## 2018-03-06 NOTE — PROGRESS NOTES
HPI:   Matthew Fry is a 75year old female who presents this evening for a MA (Medicare Advantage) Supervisit (Once per calendar year). She feels very well. No specific issues for discussion. Diet healthy, and she exercises 2-3 days weekly, cardio. Alcohol abuse and had a score of 0 so is at low risk.     Patient Care Team: Patient Care Team:  Karsten Morocho MD as PCP - General    Patient Active Problem List:     History of colon polyps     Family history of colon cancer     Hypercholesterolemia hemorrhoidectomy (1980s); colonoscopy (2014); egd (N/A, 11/17/2016); other (1995); and knee arthroscopy (Right, 1997). Her family history includes Colon Cancer in her father; Diabetes in her sister; Glaucoma in her mother and sister;  Hypertension in her misunderstand some words in a sentence and need to ask people to repeat themselves:  No   I especially have trouble understanding the speech of women and children:  No I have trouble understanding the speaker in a large room such as at a meeting or place o Kim Smith is a 75year old female who presents for a Medicare Assessment. PLAN SUMMARY:   Diagnoses and all orders for this visit:    Annual physical exam  Pneumovax today. Reinforced annual influenza vaccine. Continue current medication.   Pres Diabetes Screening      HbgA1C   Annually No results found for: A1C No flowsheet data found.     Fasting Blood Sugar (FSB)Annually   Glucose (mg/dL)   Date Value   03/05/2018 95   ----------  GLUCOSE (P) (mg/dL)   Date Value   08/29/2016 96   ---------- Risk No vaccine history found Medium/high risk factors:   End-stage renal disease   Hemophiliacs who received Factor VIII or IX concentrates   Clients of institutions for the mentally retarded   Persons who live in the same house as a HepB virus carrier

## 2018-03-06 NOTE — PATIENT INSTRUCTIONS
You received Pneumovax, the second pneumonia shot, today. Continue to get a flu shot every fall. Continue current medications. You will be due for mammogram in October. You will be due for colonoscopy in 2019.   Annual physical.  Continue to eat healthy to Medicare Visit    Abdominal aortic aneurysm screening (once between ages 73-68) IPPE only No results found for this or any previous visit.  Limited to patients who meet one of the following criteria:   • Men who are 73-68 years old and have smoked more t Annually to at least age 76, and as needed after 76 Mammogram,1 Yr due on 02/27/1984 Please get this Mammogram regularly   Immunizations      Influenza  Covered Annually No orders found for this or any previous visit.  Please get every year    Pneumococcal Latvian)  [email or web address] org  This link also has information from the [address] [place] [address] regarding Advance Directives.

## 2018-05-29 ENCOUNTER — TELEPHONE (OUTPATIENT)
Dept: INTERNAL MEDICINE CLINIC | Facility: CLINIC | Age: 73
End: 2018-05-29

## 2018-05-29 DIAGNOSIS — R22.2 MASS ON BACK: Primary | ICD-10-CM

## 2018-05-29 NOTE — TELEPHONE ENCOUNTER
Patient previously had a referral from Dr Harriet Phillips to see a general surgeon regarding the mass on her back. That referral has  and she would like to go fwd with a consultation with Dr Albania Juan.

## 2018-05-29 NOTE — TELEPHONE ENCOUNTER
Recommend instead a referral to 1 of our General Surgeons. Referral to Dr. Nikole Motley signed. Please provide patient contact information for his office.

## 2018-06-14 ENCOUNTER — OFFICE VISIT (OUTPATIENT)
Dept: SURGERY | Facility: CLINIC | Age: 73
End: 2018-06-14

## 2018-06-14 DIAGNOSIS — L72.9 SKIN CYST: Primary | ICD-10-CM

## 2018-06-14 DIAGNOSIS — L72.3 SEBACEOUS CYST: ICD-10-CM

## 2018-06-14 PROCEDURE — G0463 HOSPITAL OUTPT CLINIC VISIT: HCPCS | Performed by: SURGERY

## 2018-06-14 PROCEDURE — 99204 OFFICE O/P NEW MOD 45 MIN: CPT | Performed by: SURGERY

## 2018-06-15 NOTE — H&P
History and Physical      Brad Mtz is a 75year old female. HPI   Patient presents with:  Moles: Pt referred by Dr. Guera Villafuerte regarding mole on mid back line. Pt states mole present for years but has increased in size and now bra is bothering mole. education:                 Number of children: 0             Occupational History  Occupation          Employer            Comment               Nurse - dialysis, *                     retired    Social History Main Topics    Smoking status: Former Smoker probable sebaceous cyst of the upper midline back, mildly symptomatic. Discussed in detail with patient and options reviewed. Plan ex bx under local, off ASA - she understands risks.          6/14/2018  Anil Hammer MD

## 2018-06-20 ENCOUNTER — SURGERY (OUTPATIENT)
Age: 73
End: 2018-06-20

## 2018-06-20 ENCOUNTER — HOSPITAL ENCOUNTER (OUTPATIENT)
Facility: HOSPITAL | Age: 73
Setting detail: HOSPITAL OUTPATIENT SURGERY
Discharge: HOME | End: 2018-06-20
Attending: SURGERY | Admitting: SURGERY
Payer: MEDICARE

## 2018-06-20 VITALS
RESPIRATION RATE: 15 BRPM | HEART RATE: 58 BPM | TEMPERATURE: 98 F | OXYGEN SATURATION: 98 % | SYSTOLIC BLOOD PRESSURE: 111 MMHG | DIASTOLIC BLOOD PRESSURE: 66 MMHG

## 2018-06-20 PROCEDURE — 12031 INTMD RPR S/A/T/EXT 2.5 CM/<: CPT | Performed by: SURGERY

## 2018-06-20 PROCEDURE — 0HQ6XZZ REPAIR BACK SKIN, EXTERNAL APPROACH: ICD-10-PCS | Performed by: SURGERY

## 2018-06-20 PROCEDURE — 11402 EXC TR-EXT B9+MARG 1.1-2 CM: CPT | Performed by: SURGERY

## 2018-06-20 PROCEDURE — 0HB6XZZ EXCISION OF BACK SKIN, EXTERNAL APPROACH: ICD-10-PCS | Performed by: SURGERY

## 2018-06-20 RX ORDER — BUPIVACAINE HYDROCHLORIDE AND EPINEPHRINE 5; 5 MG/ML; UG/ML
INJECTION, SOLUTION PERINEURAL AS NEEDED
Status: DISCONTINUED | OUTPATIENT
Start: 2018-06-20 | End: 2018-06-20 | Stop reason: HOSPADM

## 2018-06-20 NOTE — INTERVAL H&P NOTE
Pre-op Diagnosis: Skin cyst mid upper back     The above referenced H&P was reviewed by Leonidas Chowdhury MD on 6/20/2018, the patient was examined and no significant changes have occurred in the patient's condition since the H&P was performed.   I discussed wi

## 2018-06-20 NOTE — BRIEF OP NOTE
Pre-Operative Diagnosis: Skin cyst mid upper back      Post-Operative Diagnosis: Skin cyst mid upper back       Procedure Performed:   Procedure(s):  Excisional biopsy mid upper back skin cyst     Surgeon(s) and Role:     Kathy Moses MD - Primary    A

## 2018-06-20 NOTE — H&P (VIEW-ONLY)
History and Physical      Atilio Mtz is a 75year old female. HPI   Patient presents with:  Moles: Pt referred by Dr. Pippa Winston regarding mole on mid back line. Pt states mole present for years but has increased in size and now bra is bothering mole. education:                 Number of children: 0             Occupational History  Occupation          Employer            Comment               Nurse - dialysis, *                     retired    Social History Main Topics    Smoking status: Former Smoker probable sebaceous cyst of the upper midline back, mildly symptomatic. Discussed in detail with patient and options reviewed. Plan ex bx under local, off ASA - she understands risks.          6/14/2018  Moreno Monge MD

## 2018-06-21 NOTE — OPERATIVE REPORT
[facility]    PATIENT'S NAME: Bakarimichael Meckel   ATTENDING PHYSICIAN: El García MD   OPERATING PHYSICIAN: El García MD   PATIENT ACCOUNT#:   [id number]    LOCATION:  [address]  MEDICAL RECORD #:   [id number]       DATE OF 15:59:57  t: 06/20/2018 20:39:41  Job [id number]  [facility]/

## 2018-07-03 ENCOUNTER — OFFICE VISIT (OUTPATIENT)
Dept: SURGERY | Facility: CLINIC | Age: 73
End: 2018-07-03

## 2018-07-03 VITALS — TEMPERATURE: 99 F

## 2018-07-03 DIAGNOSIS — L72.0 EPIDERMAL INCLUSION CYST: Primary | ICD-10-CM

## 2018-07-03 PROCEDURE — 99024 POSTOP FOLLOW-UP VISIT: CPT | Performed by: SURGERY

## 2018-07-03 PROCEDURE — G0463 HOSPITAL OUTPT CLINIC VISIT: HCPCS | Performed by: SURGERY

## 2018-07-03 NOTE — PROGRESS NOTES
Postoperative Patient Follow-up      7/3/2018    Sandy Valdezles [place] 75year old      HPI  Patient presents with:  Post-Op: Pt here for 1st post op s/p exc. bx of upper mid back skin cyst on 6/20/18. Pt denies all complaints @ current time.      No pain or fev

## 2018-09-24 ENCOUNTER — TELEPHONE (OUTPATIENT)
Dept: INTERNAL MEDICINE CLINIC | Facility: CLINIC | Age: 73
End: 2018-09-24

## 2018-09-24 DIAGNOSIS — Z12.31 ENCOUNTER FOR SCREENING MAMMOGRAM FOR BREAST CANCER: Primary | ICD-10-CM

## 2018-09-24 NOTE — TELEPHONE ENCOUNTER
Per pt, she would like an Mammo Order send to her Via mail address on file verified.  Pt insist that she has an Order on file told her that it is under Dr Marianne Eddy that it should be under Dr Bozena Bettencourt and pt can not understand that it needs to be change an

## 2018-11-06 ENCOUNTER — TELEPHONE (OUTPATIENT)
Dept: INTERNAL MEDICINE CLINIC | Facility: CLINIC | Age: 73
End: 2018-11-06

## 2018-11-06 DIAGNOSIS — H26.9 CATARACT OF BOTH EYES, UNSPECIFIED CATARACT TYPE: Primary | ICD-10-CM

## 2018-11-08 NOTE — TELEPHONE ENCOUNTER
Patient contacted and informed, states that Dr. Mayuri Mai will not be able to be seeen till Feb 2019. Wants to know if you can refer  her to another ophthalmologist.[place].

## 2018-11-14 ENCOUNTER — IMMUNIZATION (OUTPATIENT)
Dept: INTERNAL MEDICINE CLINIC | Facility: CLINIC | Age: 73
End: 2018-11-14

## 2018-11-14 ENCOUNTER — MED REC SCAN ONLY (OUTPATIENT)
Dept: INTERNAL MEDICINE CLINIC | Facility: CLINIC | Age: 73
End: 2018-11-14

## 2018-11-14 PROCEDURE — G0008 ADMIN INFLUENZA VIRUS VAC: HCPCS | Performed by: PHYSICIAN ASSISTANT

## 2018-11-14 PROCEDURE — 90653 IIV ADJUVANT VACCINE IM: CPT | Performed by: PHYSICIAN ASSISTANT

## 2018-12-21 ENCOUNTER — MED REC SCAN ONLY (OUTPATIENT)
Dept: INTERNAL MEDICINE CLINIC | Facility: CLINIC | Age: 73
End: 2018-12-21

## 2019-01-02 NOTE — TELEPHONE ENCOUNTER
Odalis Lutz from [address] [place] [address] is requesting an updated referral for the pt    Odalis Lutz stated pt switched insurances    Please advise  Ph. [phone]  Fax  [phone]

## 2019-01-14 ENCOUNTER — MED REC SCAN ONLY (OUTPATIENT)
Dept: INTERNAL MEDICINE CLINIC | Facility: CLINIC | Age: 74
End: 2019-01-14

## 2019-01-21 ENCOUNTER — TELEPHONE (OUTPATIENT)
Dept: INTERNAL MEDICINE CLINIC | Facility: CLINIC | Age: 74
End: 2019-01-21

## 2019-01-21 NOTE — TELEPHONE ENCOUNTER
Rec'd fax for medical clearance for right eye cataract surgery from Dr. Simba Chaudhry office. Scheduled surgery is set for 2/5/19.     Pls call pt to schedule a pre op exam.  Last seen in the office on 3/5/18

## 2019-01-23 ENCOUNTER — OFFICE VISIT (OUTPATIENT)
Dept: INTERNAL MEDICINE CLINIC | Facility: CLINIC | Age: 74
End: 2019-01-23
Payer: COMMERCIAL

## 2019-01-23 VITALS
WEIGHT: 192 LBS | HEIGHT: 66.5 IN | HEART RATE: 56 BPM | BODY MASS INDEX: 30.49 KG/M2 | SYSTOLIC BLOOD PRESSURE: 129 MMHG | DIASTOLIC BLOOD PRESSURE: 74 MMHG

## 2019-01-23 DIAGNOSIS — H26.9 CATARACT OF RIGHT EYE, UNSPECIFIED CATARACT TYPE: ICD-10-CM

## 2019-01-23 DIAGNOSIS — Z01.818 PREOPERATIVE EXAMINATION: Primary | ICD-10-CM

## 2019-01-23 PROCEDURE — 99213 OFFICE O/P EST LOW 20 MIN: CPT | Performed by: INTERNAL MEDICINE

## 2019-01-23 PROCEDURE — 99212 OFFICE O/P EST SF 10 MIN: CPT | Performed by: INTERNAL MEDICINE

## 2019-01-23 RX ORDER — ATORVASTATIN CALCIUM 20 MG/1
20 TABLET, FILM COATED ORAL NIGHTLY
Qty: 90 TABLET | Refills: 3 | Status: SHIPPED | OUTPATIENT
Start: 2019-01-23 | End: 2020-05-16

## 2019-01-23 RX ORDER — ASPIRIN 325 MG
325 TABLET ORAL DAILY
Status: ON HOLD | COMMUNITY
End: 2020-10-10

## 2019-01-23 NOTE — PATIENT INSTRUCTIONS
Medically stable for upcoming cataract surgery. Continue current medication. Please schedule a Medicare physical in about 3 months.

## 2019-01-23 NOTE — H&P
Rosa M Fernandez is a 75year old female who presents this morning, at Dr. Jordan Fong request, for preoperative medical evaluation. HPI:   She has bilateral cataracts, which affect her vision while driving at night.   She sees Dr. Jeffrey Pickering, and is scheduled for ri History of colon polyps    • Hypercholesterolemia    • Varicose veins of both lower extremities       Past Surgical History:   Procedure Laterality Date   • BIOPSY OF BREAST, NEEDLE CORE Left 01/24/2008    Fibroadenoma   • BIOPSY OF SKIN LESION  06/20/2018 Anicteric, conjunctiva pink, oropharynx normal  NECK: Supple without mass or thyromegaly  NODES: No peripheral adenopathy  LUNGS: Resonant to percussion and clear to auscultation  CARDIAC: Rhythm regular S1 S2 normal without murmur or edema  ABDOMEN: Bowel

## 2019-02-12 ENCOUNTER — MED REC SCAN ONLY (OUTPATIENT)
Dept: INTERNAL MEDICINE CLINIC | Facility: CLINIC | Age: 74
End: 2019-02-12

## 2019-03-04 ENCOUNTER — TELEPHONE (OUTPATIENT)
Dept: INTERNAL MEDICINE CLINIC | Facility: CLINIC | Age: 74
End: 2019-03-04

## 2019-03-04 NOTE — TELEPHONE ENCOUNTER
Pls call pt to schedule a pre op exam for cataract removal left eye.   Surgery is scheduled for 4/2/19

## 2019-03-13 ENCOUNTER — OFFICE VISIT (OUTPATIENT)
Dept: INTERNAL MEDICINE CLINIC | Facility: CLINIC | Age: 74
End: 2019-03-13
Payer: MEDICARE

## 2019-03-13 VITALS
DIASTOLIC BLOOD PRESSURE: 72 MMHG | BODY MASS INDEX: 29.7 KG/M2 | SYSTOLIC BLOOD PRESSURE: 130 MMHG | RESPIRATION RATE: 18 BRPM | HEIGHT: 66.5 IN | HEART RATE: 58 BPM | WEIGHT: 187 LBS

## 2019-03-13 DIAGNOSIS — H26.9 CATARACT OF LEFT EYE, UNSPECIFIED CATARACT TYPE: ICD-10-CM

## 2019-03-13 DIAGNOSIS — Z01.818 PREOPERATIVE EXAMINATION: Primary | ICD-10-CM

## 2019-03-13 DIAGNOSIS — E78.00 HYPERCHOLESTEROLEMIA: ICD-10-CM

## 2019-03-13 DIAGNOSIS — K21.9 GASTROESOPHAGEAL REFLUX DISEASE WITHOUT ESOPHAGITIS: ICD-10-CM

## 2019-03-13 DIAGNOSIS — N18.30 CHRONIC KIDNEY DISEASE, STAGE III (MODERATE) (HCC): ICD-10-CM

## 2019-03-13 PROCEDURE — 99213 OFFICE O/P EST LOW 20 MIN: CPT | Performed by: INTERNAL MEDICINE

## 2019-03-13 PROCEDURE — G0463 HOSPITAL OUTPT CLINIC VISIT: HCPCS | Performed by: INTERNAL MEDICINE

## 2019-03-13 NOTE — H&P
Trine Bernheim is a 75year old female who presents this morning, at Dr. Shukri Schroeder request, for preoperative medical evaluation and clearance. HPI:   She has had bilateral cataracts which affect night vision especially while driving.   In February she unde RLE, treated with Coumadin for 6 months   • GERD (gastroesophageal reflux disease)     EGD 11-16 with hiatal hernia and gastritis with biopsies benign and H. pylori testing negative   • History of colon polyps    • Hypercholesterolemia    • Varicose veins Pleasant female appearing well in no distress  /72   Pulse 58   Resp 18   Ht 5' 6.5\" (1.689 m)   Wt 187 lb (84.8 kg)   BMI 29.73 kg/m²   HEENT: Anicteric, conjunctiva pink, mild scleral injection right eye without discharge or foreign body seen, rima

## 2019-03-13 NOTE — PATIENT INSTRUCTIONS
You are medically stable for your scheduled left eye cataract surgery. Please notify Dr. Nabor Ibarra if right eye redness and swelling persists. Continue current medications. Please obtain blood tests soon and schedule a Medicare physical in 1-2 months.

## 2019-03-15 ENCOUNTER — TELEPHONE (OUTPATIENT)
Dept: CASE MANAGEMENT | Age: 74
End: 2019-03-15

## 2019-03-15 NOTE — TELEPHONE ENCOUNTER
Patient is eligible for a 2019 Annual Medicare Health Assessment. Left message to call back [phone].

## 2019-04-01 ENCOUNTER — MED REC SCAN ONLY (OUTPATIENT)
Dept: INTERNAL MEDICINE CLINIC | Facility: CLINIC | Age: 74
End: 2019-04-01

## 2019-04-10 ENCOUNTER — APPOINTMENT (OUTPATIENT)
Dept: LAB | Facility: HOSPITAL | Age: 74
End: 2019-04-10
Attending: INTERNAL MEDICINE
Payer: MEDICARE

## 2019-04-10 DIAGNOSIS — K21.9 GASTROESOPHAGEAL REFLUX DISEASE WITHOUT ESOPHAGITIS: ICD-10-CM

## 2019-04-10 DIAGNOSIS — E78.00 HYPERCHOLESTEROLEMIA: ICD-10-CM

## 2019-04-10 PROCEDURE — 80061 LIPID PANEL: CPT

## 2019-04-10 PROCEDURE — 36415 COLL VENOUS BLD VENIPUNCTURE: CPT

## 2019-04-10 PROCEDURE — 80053 COMPREHEN METABOLIC PANEL: CPT

## 2019-04-10 PROCEDURE — 85027 COMPLETE CBC AUTOMATED: CPT

## 2019-05-06 ENCOUNTER — MA CHART PREP (OUTPATIENT)
Dept: FAMILY MEDICINE CLINIC | Facility: CLINIC | Age: 74
End: 2019-05-06

## 2019-05-06 ENCOUNTER — OFFICE VISIT (OUTPATIENT)
Dept: INTERNAL MEDICINE CLINIC | Facility: CLINIC | Age: 74
End: 2019-05-06
Payer: MEDICARE

## 2019-05-06 VITALS
HEIGHT: 66.5 IN | WEIGHT: 189 LBS | SYSTOLIC BLOOD PRESSURE: 120 MMHG | DIASTOLIC BLOOD PRESSURE: 62 MMHG | BODY MASS INDEX: 30.02 KG/M2 | HEART RATE: 52 BPM

## 2019-05-06 DIAGNOSIS — N18.30 CHRONIC KIDNEY DISEASE, STAGE III (MODERATE) (HCC): ICD-10-CM

## 2019-05-06 DIAGNOSIS — E78.00 HYPERCHOLESTEROLEMIA: ICD-10-CM

## 2019-05-06 DIAGNOSIS — Z00.00 ANNUAL PHYSICAL EXAM: Primary | ICD-10-CM

## 2019-05-06 DIAGNOSIS — Z86.718 HISTORY OF DVT (DEEP VEIN THROMBOSIS): ICD-10-CM

## 2019-05-06 DIAGNOSIS — K21.9 GASTROESOPHAGEAL REFLUX DISEASE WITHOUT ESOPHAGITIS: ICD-10-CM

## 2019-05-06 DIAGNOSIS — I70.0 AORTIC ATHEROSCLEROSIS (HCC): ICD-10-CM

## 2019-05-06 DIAGNOSIS — Z00.00 ENCOUNTER FOR ANNUAL HEALTH EXAMINATION: ICD-10-CM

## 2019-05-06 DIAGNOSIS — Z86.010 HISTORY OF COLON POLYPS: ICD-10-CM

## 2019-05-06 PROBLEM — E55.9 VITAMIN D DEFICIENCY: Status: ACTIVE | Noted: 2019-05-06

## 2019-05-06 PROBLEM — E04.2 MULTINODULAR GOITER: Status: ACTIVE | Noted: 2019-05-06

## 2019-05-06 PROBLEM — H25.13 AGE-RELATED NUCLEAR CATARACT OF BOTH EYES: Status: RESOLVED | Noted: 2018-02-27 | Resolved: 2019-05-06

## 2019-05-06 PROBLEM — Z87.891 FORMER TOBACCO USE: Status: ACTIVE | Noted: 2019-05-06

## 2019-05-06 PROBLEM — N28.1 RENAL CYST: Status: ACTIVE | Noted: 2019-05-06

## 2019-05-06 PROCEDURE — 99397 PER PM REEVAL EST PAT 65+ YR: CPT | Performed by: INTERNAL MEDICINE

## 2019-05-06 PROCEDURE — 96160 PT-FOCUSED HLTH RISK ASSMT: CPT | Performed by: INTERNAL MEDICINE

## 2019-05-06 PROCEDURE — G0439 PPPS, SUBSEQ VISIT: HCPCS | Performed by: INTERNAL MEDICINE

## 2019-05-06 RX ORDER — OFLOXACIN 3 MG/ML
SOLUTION/ DROPS OPHTHALMIC
Refills: 0 | COMMUNITY
Start: 2019-03-19 | End: 2020-08-25 | Stop reason: ALTCHOICE

## 2019-05-06 RX ORDER — KETOROLAC TROMETHAMINE 5 MG/ML
SOLUTION OPHTHALMIC
Refills: 0 | COMMUNITY
Start: 2019-03-15 | End: 2020-08-24 | Stop reason: ALTCHOICE

## 2019-05-06 NOTE — PROGRESS NOTES
HPI:   Carroll Parker is a 75year old female who presents this evening for a MA (Medicare Advantage) Supervisit (Once per calendar year). She feels well. Recent successful and uncomplicated cataract surgery. No specific issues for discussion.   Diet years: 1        Types: Cigarettes        Quit date: 1975        Years since quittin.4      Smokeless tobacco: Never Used         CAGE Alcohol screening   Kendal Mtz was screened for Alcohol abuse and had a score of 0 so is at low risk.     P Omeprazole 40 MG Oral Capsule Delayed Release Take 40 mg by mouth daily.       MEDICAL INFORMATION:   She  has a past medical history of Aortic atherosclerosis ([facility] [address] Chronic kidney disease, stage III (moderate) ([address] Deep vein thrombosis ([facility] [address] (2005), G Hearing Assessment  (Required for AWV/SWV)    Hearing Screening    Time taken:  5/6/2019  6:32 PM  Screening Method:  Questionnaire  I have a problem hearing over the telephone:  No I have trouble following the conversations when two or more people are jo-ann edema  ABDOMEN: Bowel sounds normal soft nontender without mass or hepatosplenomegaly  EXTREMITIES: Normal without cyanosis or clubbing  PULSES: Carotid upstrokes 2+ without carotid bruits, distal pulses 2+ bilateral dorsalis pedis and posterior tibial  NE than 10 pounds without trying?: 2 - No  Has your appetite been poor?: No  How does the patient maintain a good energy level?: Appropriate Exercise  How would you describe your daily physical activity?: Moderate  How would you describe your current health s There are no preventive care reminders to display for this patient.  Update Health Maintenance if applicable     Immunizations (Update Immunization Activity if applicable)     Influenza  Covered Annually 11/14/2018 Please get every year    Pneumococcal 13 (

## 2019-05-06 NOTE — PATIENT INSTRUCTIONS
Please continue current medications. Contact GI to arrange repeat colonoscopy. You should obtain the shingles vaccine, Shingrix, twice at your local pharmacy between 2 months and 6 months apart. Continue healthy diet and regular exercise.   Annual physic Limited to patients who meet one of the following criteria:   • Men who are 73-68 years old and have smoked more than 100 cigarettes in their lifetime   • Anyone with a family history    Colorectal Cancer Screening  Covered up to Age 76     Colonoscopy Scr regularly   Immunizations      Influenza  Covered Annually No orders found for this or any previous visit.  Please get every year    Pneumococcal 13 (Prevnar)  Covered Once after 65 Orders placed or performed in visit on 03/22/17   • PNEUMOCOCCAL VACC, 13 V Advance Directives.

## 2019-06-04 ENCOUNTER — TELEPHONE (OUTPATIENT)
Dept: INTERNAL MEDICINE CLINIC | Facility: CLINIC | Age: 74
End: 2019-06-04

## 2019-06-04 DIAGNOSIS — H92.09 OTALGIA, UNSPECIFIED LATERALITY: Primary | ICD-10-CM

## 2019-06-04 NOTE — TELEPHONE ENCOUNTER
Dr. Precious Rodriguez, Pt called requesting a referral to see an ENT r/t ear pain. Please sign referral if you agree. Thank you.

## 2019-06-11 ENCOUNTER — MED REC SCAN ONLY (OUTPATIENT)
Dept: INTERNAL MEDICINE CLINIC | Facility: CLINIC | Age: 74
End: 2019-06-11

## 2019-06-12 ENCOUNTER — OFFICE VISIT (OUTPATIENT)
Dept: OTOLARYNGOLOGY | Facility: CLINIC | Age: 74
End: 2019-06-12
Payer: MEDICARE

## 2019-06-12 VITALS
BODY MASS INDEX: 27.92 KG/M2 | WEIGHT: 180 LBS | SYSTOLIC BLOOD PRESSURE: 104 MMHG | DIASTOLIC BLOOD PRESSURE: 62 MMHG | HEIGHT: 67.5 IN | TEMPERATURE: 98 F

## 2019-06-12 DIAGNOSIS — H61.23 BILATERAL IMPACTED CERUMEN: Primary | ICD-10-CM

## 2019-06-12 PROCEDURE — 99203 OFFICE O/P NEW LOW 30 MIN: CPT | Performed by: OTOLARYNGOLOGY

## 2019-06-12 PROCEDURE — G0463 HOSPITAL OUTPT CLINIC VISIT: HCPCS | Performed by: OTOLARYNGOLOGY

## 2019-06-12 RX ORDER — PREDNISOLONE ACETATE 10 MG/ML
SUSPENSION/ DROPS OPHTHALMIC
Refills: 0 | COMMUNITY
Start: 2019-06-07 | End: 2020-08-25 | Stop reason: ALTCHOICE

## 2019-06-12 NOTE — PROGRESS NOTES
Crystal Mariano is a 75year old female. Patient presents with:  Ear Problem: ringing in both ears, left ear is worse for 3-4 months         HISTORY OF PRESENT ILLNESS  6/12/2019   Here for evaluation of bilateral pressure plugging  hearing loss.  Patient f file        Emotionally abused: Not on file        Physically abused: Not on file        Forced sexual activity: Not on file    Other Topics      Concerns:        Not on file    Social History Narrative      Not on file      Family History   Problem Relati Endocrine Negative Cold intolerance and heat intolerance. Neuro Negative Tremors. Psych Negative Behavioral issues   Integumentary Negative Frequent skin infections, pigment change and rash. Hema/Lymph Negative Easy bleeding and easy bruising. Rfl:   •  Omeprazole 40 MG Oral Capsule Delayed Release, Take 40 mg by mouth daily. , Disp: , Rfl:   •  ofloxacin 0.3 % Ophthalmic Solution, INSTILL 1 DROP IN LEFT EYE QID. START EYE DROPS ONE DAY AFTER SURGERY, Disp: , Rfl: 0    ASSESSMENT AND PLAN  1.  Pito

## 2019-07-03 ENCOUNTER — MED REC SCAN ONLY (OUTPATIENT)
Dept: INTERNAL MEDICINE CLINIC | Facility: CLINIC | Age: 74
End: 2019-07-03

## 2019-10-15 ENCOUNTER — TELEPHONE (OUTPATIENT)
Dept: INTERNAL MEDICINE CLINIC | Facility: CLINIC | Age: 74
End: 2019-10-15

## 2019-10-15 DIAGNOSIS — Z12.31 BREAST CANCER SCREENING BY MAMMOGRAM: ICD-10-CM

## 2019-10-15 DIAGNOSIS — Z12.39 BREAST CANCER SCREENING: Primary | ICD-10-CM

## 2019-10-15 NOTE — TELEPHONE ENCOUNTER
Dr. Marcy Shook, patient is requesting a mammogram order. Last mammogram was completed on 10/24/2018 at [facility]. Please advise on order.

## 2019-10-17 ENCOUNTER — MED REC SCAN ONLY (OUTPATIENT)
Dept: INTERNAL MEDICINE CLINIC | Facility: CLINIC | Age: 74
End: 2019-10-17

## 2019-10-17 ENCOUNTER — IMMUNIZATION (OUTPATIENT)
Dept: INTERNAL MEDICINE CLINIC | Facility: CLINIC | Age: 74
End: 2019-10-17
Payer: MEDICARE

## 2019-10-17 DIAGNOSIS — Z23 NEED FOR VACCINATION: ICD-10-CM

## 2019-10-17 PROCEDURE — G0008 ADMIN INFLUENZA VIRUS VAC: HCPCS | Performed by: INTERNAL MEDICINE

## 2019-10-17 PROCEDURE — 90662 IIV NO PRSV INCREASED AG IM: CPT | Performed by: INTERNAL MEDICINE

## 2019-11-11 ENCOUNTER — HOSPITAL ENCOUNTER (OUTPATIENT)
Dept: MAMMOGRAPHY | Facility: HOSPITAL | Age: 74
Discharge: HOME | End: 2019-11-11
Attending: INTERNAL MEDICINE
Payer: MEDICARE

## 2019-11-11 ENCOUNTER — TELEPHONE (OUTPATIENT)
Dept: INTERNAL MEDICINE CLINIC | Facility: CLINIC | Age: 74
End: 2019-11-11

## 2019-11-11 DIAGNOSIS — H57.89 REDNESS OF RIGHT EYE: Primary | ICD-10-CM

## 2019-11-11 DIAGNOSIS — Z12.39 BREAST CANCER SCREENING: ICD-10-CM

## 2019-11-11 DIAGNOSIS — Z12.31 BREAST CANCER SCREENING BY MAMMOGRAM: ICD-10-CM

## 2019-11-11 PROCEDURE — 77067 SCR MAMMO BI INCL CAD: CPT | Performed by: INTERNAL MEDICINE

## 2019-11-11 PROCEDURE — 77063 BREAST TOMOSYNTHESIS BI: CPT | Performed by: INTERNAL MEDICINE

## 2019-11-11 NOTE — TELEPHONE ENCOUNTER
Pt called stating she needs a referral for Dr. Ashley Reynolds- pt had surgery and eye is running and red

## 2019-11-11 NOTE — TELEPHONE ENCOUNTER
Pt stated need referral ,called Dr Woodson Geovani office this AM- right eye has redness/fluttering symptom x 2 days

## 2019-11-11 NOTE — TELEPHONE ENCOUNTER
Notified pt doctor has placed referral for Dr. Ronald Alford as requested, pt had no further questions at this time.

## 2019-12-17 ENCOUNTER — OFFICE VISIT (OUTPATIENT)
Dept: INTERNAL MEDICINE CLINIC | Facility: CLINIC | Age: 74
End: 2019-12-17
Payer: MEDICARE

## 2019-12-17 ENCOUNTER — HOSPITAL ENCOUNTER (OUTPATIENT)
Dept: GENERAL RADIOLOGY | Facility: HOSPITAL | Age: 74
Discharge: HOME | End: 2019-12-17
Attending: INTERNAL MEDICINE
Payer: MEDICARE

## 2019-12-17 VITALS
DIASTOLIC BLOOD PRESSURE: 68 MMHG | HEIGHT: 66.5 IN | WEIGHT: 189.63 LBS | HEART RATE: 61 BPM | BODY MASS INDEX: 30.11 KG/M2 | SYSTOLIC BLOOD PRESSURE: 112 MMHG

## 2019-12-17 DIAGNOSIS — G89.29 CHRONIC PAIN OF RIGHT KNEE: ICD-10-CM

## 2019-12-17 DIAGNOSIS — M25.561 CHRONIC PAIN OF RIGHT KNEE: ICD-10-CM

## 2019-12-17 DIAGNOSIS — G89.29 CHRONIC PAIN OF RIGHT KNEE: Primary | ICD-10-CM

## 2019-12-17 DIAGNOSIS — M25.561 CHRONIC PAIN OF RIGHT KNEE: Primary | ICD-10-CM

## 2019-12-17 PROCEDURE — 73562 X-RAY EXAM OF KNEE 3: CPT | Performed by: INTERNAL MEDICINE

## 2019-12-17 PROCEDURE — 99213 OFFICE O/P EST LOW 20 MIN: CPT | Performed by: INTERNAL MEDICINE

## 2019-12-17 NOTE — PROGRESS NOTES
Dann Gonzalez is a 75year old female. Patient presents with:  Knee Pain: right knee    HPI:   About 20 years ago she had a right knee arthroscopy. She believes she had a cartilage tear at that time.   Since then she has had persistent intermittent pain Hypercholesterolemia    • Varicose veins of both lower extremities      Past Surgical History:   Procedure Laterality Date   • BIOPSY OF BREAST, NEEDLE CORE Left 01/24/2008    Fibroadenoma   • BIOPSY OF SKIN LESION  06/20/2018    Excision upper mid back ep as needed. Physical therapy. Order with phone number given. - PHYSICAL THERAPY - INTERNAL  - XR KNEE ROUTINE (3 VIEWS), RIGHT (CPT=73562); Future      The patient indicates understanding of these issues and agrees to the plan.   The patient is asked to r

## 2019-12-17 NOTE — PATIENT INSTRUCTIONS
Await results of right knee x-ray. Please rest and elevate your right leg and apply ice 2-3 times daily as needed, and take Tylenol or Advil when needed. Schedule physical therapy.

## 2020-01-13 ENCOUNTER — OFFICE VISIT (OUTPATIENT)
Dept: PHYSICAL THERAPY | Facility: HOSPITAL | Age: 75
End: 2020-01-13
Attending: INTERNAL MEDICINE
Payer: MEDICARE

## 2020-01-13 DIAGNOSIS — G89.29 CHRONIC PAIN OF RIGHT KNEE: ICD-10-CM

## 2020-01-13 DIAGNOSIS — M25.561 CHRONIC PAIN OF RIGHT KNEE: ICD-10-CM

## 2020-01-13 PROCEDURE — 97162 PT EVAL MOD COMPLEX 30 MIN: CPT

## 2020-01-13 PROCEDURE — 97110 THERAPEUTIC EXERCISES: CPT

## 2020-01-13 NOTE — PROGRESS NOTES
KNEE EVALUATION:   Referring Physician: Dr. Crawford Sole  Diagnosis: Chronic pain of right knee (M25.561,G89.29)    Date of Service: 1/13/2020     PATIENT Sonia Olivarez is a 75year old y/o female who presents to therapy today with complaints of R Excision upper mid back epidermal inclusion cyst   • CATARACT Right 02/2019   • CATARACT Left 03/2019   • COLONOSCOPY  2014    [facility]   • ESOPHAGOGASTRODUODENOSCOPY (EGD) N/A 11/17/2016    Performed by Shyann Quick MD at [facility] Ankle:      Dorsiflexion 5/5 5/5    Plantarflexion 4/5 4/5      Joint Mobility:     Left Right Comments   Patellofemoral Medial  glide WNL Hypomobile     Lateral glide WNL Hypomobile     Inferior glide WNL Hypomobile     Superior glide WNL Hypomobile Charges: PT Eval Moderate Complexity, Therex 1      Total Timed Treatment: 10 min     Total Treatment Time: 45 min     Based on clinical rationale and outcome measures, this evaluation involved Moderate Complexity decision making due to 3+ personal factors Certification From: 4/68/3425  To:4/12/2020

## 2020-01-15 ENCOUNTER — APPOINTMENT (OUTPATIENT)
Dept: PHYSICAL THERAPY | Facility: HOSPITAL | Age: 75
End: 2020-01-15
Attending: INTERNAL MEDICINE
Payer: MEDICARE

## 2020-01-20 ENCOUNTER — OFFICE VISIT (OUTPATIENT)
Dept: PHYSICAL THERAPY | Facility: HOSPITAL | Age: 75
End: 2020-01-20
Attending: INTERNAL MEDICINE
Payer: MEDICARE

## 2020-01-20 DIAGNOSIS — G89.29 CHRONIC PAIN OF RIGHT KNEE: ICD-10-CM

## 2020-01-20 DIAGNOSIS — M25.561 CHRONIC PAIN OF RIGHT KNEE: ICD-10-CM

## 2020-01-20 PROCEDURE — 97110 THERAPEUTIC EXERCISES: CPT

## 2020-01-20 NOTE — PROGRESS NOTES
Dx: Chronic pain of right knee (M25.561,G89.29)             Insurance (Authorized # of Visits):  Iman Rain Physician: Dr. Lula Islas    Next MD visit: none scheduled  Fall Risk: standard           Precautions: NONE        Subjective: \"I

## 2020-01-22 ENCOUNTER — OFFICE VISIT (OUTPATIENT)
Dept: PHYSICAL THERAPY | Facility: HOSPITAL | Age: 75
End: 2020-01-22
Attending: INTERNAL MEDICINE
Payer: MEDICARE

## 2020-01-22 DIAGNOSIS — M25.561 CHRONIC PAIN OF RIGHT KNEE: ICD-10-CM

## 2020-01-22 DIAGNOSIS — G89.29 CHRONIC PAIN OF RIGHT KNEE: ICD-10-CM

## 2020-01-22 PROCEDURE — 97110 THERAPEUTIC EXERCISES: CPT

## 2020-01-22 NOTE — PROGRESS NOTES
Dx: Chronic pain of right knee (M25.561,G89.29)             Insurance (Authorized # of Visits):  Luis Contreras Physician: Dr. Brii Siegel    Next MD visit: none scheduled  Fall Risk: standard           Precautions: NONE        Subjective: \"I she is able to walk for 45 mins or longer with 2/10 pain or less to demonstrate improvement in community ADL performance.    [facility] will report she is able to squat/kneel with 2/10 pain or less to demonstrate improvement in her household ADL per

## 2020-01-27 ENCOUNTER — OFFICE VISIT (OUTPATIENT)
Dept: PHYSICAL THERAPY | Facility: HOSPITAL | Age: 75
End: 2020-01-27
Attending: INTERNAL MEDICINE
Payer: MEDICARE

## 2020-01-27 DIAGNOSIS — M25.561 CHRONIC PAIN OF RIGHT KNEE: ICD-10-CM

## 2020-01-27 DIAGNOSIS — G89.29 CHRONIC PAIN OF RIGHT KNEE: ICD-10-CM

## 2020-01-27 PROCEDURE — 97110 THERAPEUTIC EXERCISES: CPT

## 2020-01-27 NOTE — PROGRESS NOTES
Dx: Chronic pain of right knee (M25.561,G89.29)             Insurance (Authorized # of Visits):  Diandra Russ Physician: Dr. Luiz Allen    Next MD visit: none scheduled  Fall Risk: standard           Precautions: NONE        Subjective: \"I global right hip strength and decreased quad strength. Focus on RLE stretching, quad activation and global hip strengthening exercises. Added unloaded shuttle squats to quad strengthening exercises.  Patient c/o no c/o increased pain or discomfort after dottie

## 2020-01-29 ENCOUNTER — OFFICE VISIT (OUTPATIENT)
Dept: PHYSICAL THERAPY | Facility: HOSPITAL | Age: 75
End: 2020-01-29
Attending: INTERNAL MEDICINE
Payer: MEDICARE

## 2020-01-29 DIAGNOSIS — M25.561 CHRONIC PAIN OF RIGHT KNEE: ICD-10-CM

## 2020-01-29 DIAGNOSIS — G89.29 CHRONIC PAIN OF RIGHT KNEE: ICD-10-CM

## 2020-01-29 PROCEDURE — 97110 THERAPEUTIC EXERCISES: CPT

## 2020-01-29 NOTE — PROGRESS NOTES
Dx: Chronic pain of right knee (M25.561,G89.29)             Insurance (Authorized # of Visits):  Dianne Gamez Physician: Dr. Fareed Hernandez    Next MD visit: none scheduled  Fall Risk: standard           Precautions: NONE        Subjective: \"I heel raises x 15  -GABRIELA calf stretch L3 x3 min hold   -Standing marching x20  -Standing hip abd  x20 B  -Standing hip ext x 20 B  -TKE with GTB 20 x5 sec hold   -Standing hamstring stretch 4 x 20 sec hold   -Mini squats x 15  -BOSU fwd lunges x 10 ea  -Se

## 2020-02-06 ENCOUNTER — TELEPHONE (OUTPATIENT)
Dept: PHYSICAL THERAPY | Facility: HOSPITAL | Age: 75
End: 2020-02-06

## 2020-02-11 ENCOUNTER — TELEPHONE (OUTPATIENT)
Dept: CASE MANAGEMENT | Age: 75
End: 2020-02-11

## 2020-02-11 NOTE — TELEPHONE ENCOUNTER
Patient is eligible for a 2020 Medicare Advantage Supervisit. Left message to call back [phone].

## 2020-02-13 ENCOUNTER — HOSPITAL ENCOUNTER (OUTPATIENT)
Dept: ULTRASOUND IMAGING | Facility: HOSPITAL | Age: 75
Discharge: HOME | End: 2020-02-13
Attending: INTERNAL MEDICINE
Payer: MEDICARE

## 2020-02-13 ENCOUNTER — HOSPITAL ENCOUNTER (OUTPATIENT)
Dept: MAMMOGRAPHY | Facility: HOSPITAL | Age: 75
Discharge: HOME | End: 2020-02-13
Attending: INTERNAL MEDICINE
Payer: MEDICARE

## 2020-02-13 DIAGNOSIS — R92.8 ABNORMAL MAMMOGRAM: ICD-10-CM

## 2020-02-13 PROCEDURE — 77065 DX MAMMO INCL CAD UNI: CPT | Performed by: INTERNAL MEDICINE

## 2020-02-13 PROCEDURE — 77061 BREAST TOMOSYNTHESIS UNI: CPT | Performed by: INTERNAL MEDICINE

## 2020-02-13 PROCEDURE — 76642 ULTRASOUND BREAST LIMITED: CPT | Performed by: INTERNAL MEDICINE

## 2020-02-26 ENCOUNTER — OFFICE VISIT (OUTPATIENT)
Dept: PHYSICAL THERAPY | Facility: HOSPITAL | Age: 75
End: 2020-02-26
Attending: INTERNAL MEDICINE
Payer: MEDICARE

## 2020-02-26 PROCEDURE — 97140 MANUAL THERAPY 1/> REGIONS: CPT

## 2020-02-26 PROCEDURE — 97110 THERAPEUTIC EXERCISES: CPT

## 2020-02-26 NOTE — PROGRESS NOTES
KNEE EVALUATION:   Referring Physician: Dr. Mari Hodge  Diagnosis: Chronic pain of right knee (M25.561,G89.29)    Date of Service: 2/26/2020      PATIENT Jefferson Babinski is a 75year old y/o female who reports decreased R knee pain worst at anterio months   • GERD (gastroesophageal reflux disease)     EGD 11-16 with hiatal hernia and gastritis with biopsies benign and H. pylori testing negative   • History of colon polyps    • Hypercholesterolemia    • Varicose veins of both lower extremities      Pa consistent with that she has not reached her full functional level and is likely to continue to make gains within PT.     Kendal would benefit from skilled Physical Therapy to address the above impairments to allow for return to prior level of function    P Comments   Lachman’s Negative Negative    Patellar Apprehension Test Negative Negative    Valgus Stress Negative Negative    Varus Stress Negative Negative    Anterior Drawer Negative Negative    Posterior Drawer Negative Negative    Thessaly's Test Not te period. Treatment will include: Manual Therapy; Therapeutic Exercises; Neuromuscular Re-education;  Therapeutic Activity; Gait Training; Electrical Stim; Pt education; Home exercise program instructions    Education or treatment limitation: None  Rehab

## 2020-03-02 ENCOUNTER — OFFICE VISIT (OUTPATIENT)
Dept: PHYSICAL THERAPY | Facility: HOSPITAL | Age: 75
End: 2020-03-02
Attending: INTERNAL MEDICINE
Payer: MEDICARE

## 2020-03-02 PROCEDURE — 97140 MANUAL THERAPY 1/> REGIONS: CPT

## 2020-03-02 PROCEDURE — 97110 THERAPEUTIC EXERCISES: CPT

## 2020-03-02 NOTE — PROGRESS NOTES
Dx: Chronic pain of right knee (M25.561,G89.29)             Insurance (Authorized # of Visits):  Mimi Alexandre Physician: Dr. Bozena Lawler MD visit: none scheduled  Fall Risk: standard           Precautions: NONE        Subjective: pt r L5 x8 min  -Shuttle 35# DL x 3 min  -Standing heel raises x 15  -GABRIELA calf stretch L3 x3 min hold   -Standing marching x20  -Standing hip abd  x20 B  -Standing hip ext x 20 B  -TKE with GTB 20 x5 sec hold   -Standing hamstring stretch 4 x 20 sec hold   -M

## 2020-03-04 ENCOUNTER — OFFICE VISIT (OUTPATIENT)
Dept: PHYSICAL THERAPY | Facility: HOSPITAL | Age: 75
End: 2020-03-04
Attending: INTERNAL MEDICINE
Payer: MEDICARE

## 2020-03-04 PROCEDURE — 97110 THERAPEUTIC EXERCISES: CPT

## 2020-03-04 PROCEDURE — 97140 MANUAL THERAPY 1/> REGIONS: CPT

## 2020-03-04 NOTE — PROGRESS NOTES
Dx: Chronic pain of right knee (M25.561,G89.29)             Insurance (Authorized # of Visits): [facility]            Authorizing Physician: Dr. Winford Osler ref.  provider found    Next MD visit: none scheduled  Fall Risk: standard           Precautions: NONE able to walk for 45 mins or longer with 2/10 pain or less to demonstrate improvement in community ADL performance.    [address] [facility] will report she is able to squat/kneel with 2/10 pain or less to demonstrate improvement in her household ADL performanc

## 2020-03-09 ENCOUNTER — OFFICE VISIT (OUTPATIENT)
Dept: PHYSICAL THERAPY | Facility: HOSPITAL | Age: 75
End: 2020-03-09
Attending: INTERNAL MEDICINE
Payer: MEDICARE

## 2020-03-09 PROCEDURE — 97140 MANUAL THERAPY 1/> REGIONS: CPT

## 2020-03-09 PROCEDURE — 97110 THERAPEUTIC EXERCISES: CPT

## 2020-03-09 NOTE — PROGRESS NOTES
Dx: Chronic pain of right knee (M25.561,G89.29)             Insurance (Authorized # of Visits): [facility]            Authorizing Physician: Dr. Yojana Lemon ref.  provider found    Next MD visit: none scheduled  Fall Risk: standard           Precautions: NONE increased pain at patellar ligament consistent with her continued bent knee gait pattern. Goals: To be met in 90 days  1. [facility] will demonstrate score of 64/100 on FOTO to demonstrate return to maximum functional performance.    2. Guera Sanchez

## 2020-03-11 ENCOUNTER — TELEPHONE (OUTPATIENT)
Dept: PHYSICAL THERAPY | Facility: HOSPITAL | Age: 75
End: 2020-03-11

## 2020-03-16 ENCOUNTER — APPOINTMENT (OUTPATIENT)
Dept: PHYSICAL THERAPY | Facility: HOSPITAL | Age: 75
End: 2020-03-16
Attending: INTERNAL MEDICINE
Payer: MEDICARE

## 2020-03-16 ENCOUNTER — TELEPHONE (OUTPATIENT)
Dept: PHYSICAL THERAPY | Facility: HOSPITAL | Age: 75
End: 2020-03-16

## 2020-03-18 ENCOUNTER — APPOINTMENT (OUTPATIENT)
Dept: PHYSICAL THERAPY | Facility: HOSPITAL | Age: 75
End: 2020-03-18
Attending: INTERNAL MEDICINE
Payer: MEDICARE

## 2020-03-23 ENCOUNTER — APPOINTMENT (OUTPATIENT)
Dept: PHYSICAL THERAPY | Facility: HOSPITAL | Age: 75
End: 2020-03-23
Attending: INTERNAL MEDICINE
Payer: MEDICARE

## 2020-03-25 ENCOUNTER — APPOINTMENT (OUTPATIENT)
Dept: PHYSICAL THERAPY | Facility: HOSPITAL | Age: 75
End: 2020-03-25
Attending: INTERNAL MEDICINE
Payer: MEDICARE

## 2020-03-25 ENCOUNTER — TELEPHONE (OUTPATIENT)
Dept: PHYSICAL THERAPY | Facility: HOSPITAL | Age: 75
End: 2020-03-25

## 2020-03-25 NOTE — TELEPHONE ENCOUNTER
Contacted pt to discuss department's initiative to protect all patient's from COVID-19 exposure. Discussed recommendations relative to pt's HEP and POC. Pt educated that all visits will be cancelled prior to 4/13/2020.  Future appointments confirmed and

## 2020-04-05 ENCOUNTER — TELEPHONE (OUTPATIENT)
Dept: INTERNAL MEDICINE CLINIC | Facility: CLINIC | Age: 75
End: 2020-04-05

## 2020-04-05 DIAGNOSIS — R53.1 GENERALIZED WEAKNESS: Primary | ICD-10-CM

## 2020-04-05 DIAGNOSIS — Z71.89 ADVICE GIVEN ABOUT COVID-19 VIRUS BY TELEPHONE: ICD-10-CM

## 2020-04-05 PROCEDURE — G2012 BRIEF CHECK IN BY MD/QHP: HCPCS | Performed by: INTERNAL MEDICINE

## 2020-04-05 NOTE — TELEPHONE ENCOUNTER
Virtual/Telephone Check-In    [facility] verbally consents to a Virtual/Telephone Check-In service on 04/05/20. Patient understands and accepts financial responsibility for any deductible, co-insurance and/or co-pays associated with this service.

## 2020-04-07 ENCOUNTER — TELEPHONE (OUTPATIENT)
Dept: PHYSICAL THERAPY | Facility: HOSPITAL | Age: 75
End: 2020-04-07

## 2020-04-07 NOTE — TELEPHONE ENCOUNTER
Contacted pt to discuss department's initiative to protect all patient's from COVID-19 exposure. Discussed patient's current symptoms, home program, and concerns. Pt reports she is compliant with HEP. Pt reports her knee pain is decreased.  Pt did not w

## 2020-04-09 ENCOUNTER — APPOINTMENT (OUTPATIENT)
Dept: PHYSICAL THERAPY | Facility: HOSPITAL | Age: 75
End: 2020-04-09
Attending: INTERNAL MEDICINE
Payer: MEDICARE

## 2020-04-16 ENCOUNTER — APPOINTMENT (OUTPATIENT)
Dept: PHYSICAL THERAPY | Facility: HOSPITAL | Age: 75
End: 2020-04-16
Attending: INTERNAL MEDICINE
Payer: MEDICARE

## 2020-04-20 ENCOUNTER — APPOINTMENT (OUTPATIENT)
Dept: PHYSICAL THERAPY | Facility: HOSPITAL | Age: 75
End: 2020-04-20
Attending: INTERNAL MEDICINE
Payer: MEDICARE

## 2020-04-22 ENCOUNTER — APPOINTMENT (OUTPATIENT)
Dept: PHYSICAL THERAPY | Facility: HOSPITAL | Age: 75
End: 2020-04-22
Attending: INTERNAL MEDICINE
Payer: MEDICARE

## 2020-04-26 ENCOUNTER — HOSPITAL ENCOUNTER (EMERGENCY)
Facility: HOSPITAL | Age: 75
Discharge: HOME | End: 2020-04-26
Attending: EMERGENCY MEDICINE
Payer: MEDICARE

## 2020-04-26 VITALS
WEIGHT: 189.63 LBS | BODY MASS INDEX: 30 KG/M2 | DIASTOLIC BLOOD PRESSURE: 86 MMHG | HEART RATE: 85 BPM | TEMPERATURE: 99 F | OXYGEN SATURATION: 97 % | RESPIRATION RATE: 20 BRPM | SYSTOLIC BLOOD PRESSURE: 135 MMHG

## 2020-04-26 DIAGNOSIS — B34.9 VIRAL SYNDROME: Primary | ICD-10-CM

## 2020-04-26 PROCEDURE — 99283 EMERGENCY DEPT VISIT LOW MDM: CPT

## 2020-04-26 NOTE — ED PROVIDER NOTES
Patient Seen in: [facility] AND [facility]      History   Patient presents with:   Body ache and/or chills    Stated Complaint: chills    HPI    19-year-old female with past medical history significant for CAD, chronic kidney disease stage II 2.00        Pack years: 1        Types: Cigarettes        Quit date: 1975        Years since quittin.4      Smokeless tobacco: Never Used    Alcohol use: Yes      Alcohol/week: 0.0 standard drinks      Comment: Occasional    Drug use:  No syndrome  (primary encounter diagnosis)    Disposition:  Discharge  4/26/2020 12:02 pm    Follow-up:  Les Cummings MD  [address] [place] [address]  [phone]    Call in 2 days      We recommend that you schedule follow up care with a primar

## 2020-04-27 ENCOUNTER — APPOINTMENT (OUTPATIENT)
Dept: PHYSICAL THERAPY | Facility: HOSPITAL | Age: 75
End: 2020-04-27
Attending: INTERNAL MEDICINE
Payer: MEDICARE

## 2020-04-30 ENCOUNTER — TELEPHONE (OUTPATIENT)
Dept: PHYSICAL THERAPY | Facility: HOSPITAL | Age: 75
End: 2020-04-30

## 2020-04-30 ENCOUNTER — TELEPHONE (OUTPATIENT)
Dept: INTERNAL MEDICINE CLINIC | Facility: CLINIC | Age: 75
End: 2020-04-30

## 2020-04-30 DIAGNOSIS — Z00.00 ANNUAL PHYSICAL EXAM: Primary | ICD-10-CM

## 2020-04-30 NOTE — TELEPHONE ENCOUNTER
VM left for patient regarding her scheduled visit for next Tuesday. Pt agreeable to attending. Pt's visit for Tuesday was confirmed.

## 2020-04-30 NOTE — TELEPHONE ENCOUNTER
Patient is requesting for blood works prior to her MA supervisit in [place] 10. Please call patient once orders are in the system. Thank you.

## 2020-04-30 NOTE — TELEPHONE ENCOUNTER
Dr. Renetta Esparza, patient is schedule for a Medicare Px on 06/10/2020. Patient is requesting blood test order for appointment. Please advise.

## 2020-05-04 ENCOUNTER — TELEPHONE (OUTPATIENT)
Dept: PHYSICAL THERAPY | Facility: HOSPITAL | Age: 75
End: 2020-05-04

## 2020-05-05 ENCOUNTER — APPOINTMENT (OUTPATIENT)
Dept: PHYSICAL THERAPY | Facility: HOSPITAL | Age: 75
End: 2020-05-05
Attending: INTERNAL MEDICINE
Payer: MEDICARE

## 2020-05-07 ENCOUNTER — APPOINTMENT (OUTPATIENT)
Dept: PHYSICAL THERAPY | Facility: HOSPITAL | Age: 75
End: 2020-05-07
Attending: INTERNAL MEDICINE
Payer: MEDICARE

## 2020-05-08 ENCOUNTER — APPOINTMENT (OUTPATIENT)
Dept: PHYSICAL THERAPY | Facility: HOSPITAL | Age: 75
End: 2020-05-08
Attending: INTERNAL MEDICINE
Payer: MEDICARE

## 2020-05-10 ENCOUNTER — TELEPHONE (OUTPATIENT)
Dept: INTERNAL MEDICINE CLINIC | Facility: CLINIC | Age: 75
End: 2020-05-10

## 2020-05-10 ENCOUNTER — APPOINTMENT (OUTPATIENT)
Dept: GENERAL RADIOLOGY | Facility: HOSPITAL | Age: 75
End: 2020-05-10
Attending: EMERGENCY MEDICINE
Payer: MEDICARE

## 2020-05-10 ENCOUNTER — HOSPITAL ENCOUNTER (EMERGENCY)
Facility: HOSPITAL | Age: 75
Discharge: HOME | End: 2020-05-10
Attending: EMERGENCY MEDICINE
Payer: MEDICARE

## 2020-05-10 VITALS
HEART RATE: 66 BPM | OXYGEN SATURATION: 96 % | BODY MASS INDEX: 27.47 KG/M2 | HEIGHT: 67 IN | DIASTOLIC BLOOD PRESSURE: 70 MMHG | TEMPERATURE: 97 F | SYSTOLIC BLOOD PRESSURE: 125 MMHG | RESPIRATION RATE: 18 BRPM | WEIGHT: 175 LBS

## 2020-05-10 DIAGNOSIS — R53.83 FATIGUE, UNSPECIFIED TYPE: Primary | ICD-10-CM

## 2020-05-10 PROCEDURE — 99284 EMERGENCY DEPT VISIT MOD MDM: CPT

## 2020-05-10 PROCEDURE — 93005 ELECTROCARDIOGRAM TRACING: CPT

## 2020-05-10 PROCEDURE — 85025 COMPLETE CBC W/AUTO DIFF WBC: CPT | Performed by: EMERGENCY MEDICINE

## 2020-05-10 PROCEDURE — 84443 ASSAY THYROID STIM HORMONE: CPT | Performed by: EMERGENCY MEDICINE

## 2020-05-10 PROCEDURE — 36415 COLL VENOUS BLD VENIPUNCTURE: CPT

## 2020-05-10 PROCEDURE — 81001 URINALYSIS AUTO W/SCOPE: CPT | Performed by: EMERGENCY MEDICINE

## 2020-05-10 PROCEDURE — 71045 X-RAY EXAM CHEST 1 VIEW: CPT | Performed by: EMERGENCY MEDICINE

## 2020-05-10 PROCEDURE — 93010 ELECTROCARDIOGRAM REPORT: CPT | Performed by: EMERGENCY MEDICINE

## 2020-05-10 PROCEDURE — 80048 BASIC METABOLIC PNL TOTAL CA: CPT | Performed by: EMERGENCY MEDICINE

## 2020-05-10 NOTE — TELEPHONE ENCOUNTER
Spoke with patient through the answering service by phone early this morning at approximately 3:20 AM.  Ongoing symptoms of generalized weakness, chills and tinnitus. Symptoms are preventing her from sleeping.   She remains concerned about COVID-19 infecti

## 2020-05-10 NOTE — ED PROVIDER NOTES
Patient Seen in: [facility] AND [facility] Emergency Department      History   Patient presents with:  Fatigue    Stated Complaint: BODY ACHES AND CHILLS    HPI    The patient is a 79-year-old female who presents with 1 month of intermittent subjective fevers an quittin.5      Smokeless tobacco: Never Used    Alcohol use: Yes      Alcohol/week: 0.0 standard drinks      Comment: Occasional    Drug use:  No             Review of Systems    Positive for stated complaint: BODY ACHES AND CHILLS  Other systems are a Findings: No rash. Neurological:      General: No focal deficit present. Mental Status: She is alert and oriented to person, place, and time. Deep Tendon Reflexes: Reflexes are normal and symmetric.    Psychiatric:         Judgment: Judgment nor Monitor: Pulse Readings from Last 1 Encounters:  05/10/20 : 66  , sinus, normal    Radiology findings: Xr Chest Ap Portable  (cpt=71045)    Result Date: 5/10/2020  CONCLUSION:  1. Borderline cardiomegaly. Tortuous atherosclerotic aorta.  2. Chronic bibasil

## 2020-05-13 ENCOUNTER — TELEPHONE (OUTPATIENT)
Dept: INTERNAL MEDICINE CLINIC | Facility: CLINIC | Age: 75
End: 2020-05-13

## 2020-05-13 ENCOUNTER — VIRTUAL PHONE E/M (OUTPATIENT)
Dept: INTERNAL MEDICINE CLINIC | Facility: CLINIC | Age: 75
End: 2020-05-13
Payer: MEDICARE

## 2020-05-13 DIAGNOSIS — R61 EXCESSIVE SWEATING: Primary | ICD-10-CM

## 2020-05-13 PROCEDURE — 99442 PHONE E/M BY PHYS 11-20 MIN: CPT | Performed by: INTERNAL MEDICINE

## 2020-05-13 NOTE — TELEPHONE ENCOUNTER
Patient stated she needed to schedule a follow up in person appt with you this Friday 5/15. May we put this patient in for an in person appt? Please advise. Thank you.

## 2020-05-13 NOTE — PROGRESS NOTES
Virtual Telephone Check-In    [facility] verbally consents to a Virtual/Telephone Check-In visit on 05/13/20. Patient understands and accepts financial responsibility for any deductible, co-insurance and/or co-pays associated with this service. whether she should receive a \"pneumonia shot. \"  She has recently received both Prevnar and Pneumovax.   Current Outpatient Medications   Medication Sig Dispense Refill   • prednisoLONE acetate 1 % Ophthalmic Suspension INSTILL 1 DROP INTO LEFT EYE QID  0 EXTREMITY; <20 INCISIONS Right       Social History:  Social History    Tobacco Use      Smoking status: Former Smoker        Packs/day: 0.50        Years: 2.00        Pack years: 1        Types: Cigarettes        Quit date: 11/17/1975        Years since q

## 2020-05-15 ENCOUNTER — OFFICE VISIT (OUTPATIENT)
Dept: INTERNAL MEDICINE CLINIC | Facility: CLINIC | Age: 75
End: 2020-05-15
Payer: MEDICARE

## 2020-05-15 ENCOUNTER — APPOINTMENT (OUTPATIENT)
Dept: PHYSICAL THERAPY | Facility: HOSPITAL | Age: 75
End: 2020-05-15
Attending: INTERNAL MEDICINE
Payer: MEDICARE

## 2020-05-15 VITALS
DIASTOLIC BLOOD PRESSURE: 77 MMHG | SYSTOLIC BLOOD PRESSURE: 126 MMHG | TEMPERATURE: 99 F | WEIGHT: 192.25 LBS | BODY MASS INDEX: 30.17 KG/M2 | RESPIRATION RATE: 19 BRPM | HEIGHT: 67 IN | HEART RATE: 61 BPM

## 2020-05-15 DIAGNOSIS — M17.11 PRIMARY OSTEOARTHRITIS OF RIGHT KNEE: ICD-10-CM

## 2020-05-15 DIAGNOSIS — L74.9 SWEATING ABNORMALITY: Primary | ICD-10-CM

## 2020-05-15 PROCEDURE — 99213 OFFICE O/P EST LOW 20 MIN: CPT | Performed by: INTERNAL MEDICINE

## 2020-05-15 NOTE — PATIENT INSTRUCTIONS
Please monitor symptoms closely and call if other symptoms develop. Please schedule an appointment with Orthopedics. Please schedule a Medicare physical soon.

## 2020-05-15 NOTE — PROGRESS NOTES
Vandana Kendall is a 67year old female who presents this afternoon for follow-up  HPI:   She continues to be concerned about persistent intermittent \"cold sweats\" that seem to occur when she walks longer distances and also in bed at night.   Episodes months   • GERD (gastroesophageal reflux disease)     EGD 11-16 with hiatal hernia and gastritis with biopsies benign and H. pylori testing negative   • History of colon polyps    • Hypercholesterolemia    • Varicose veins of both lower extremities       P hematuria  MUSCULOSKELETAL: No leg swelling  NEURO: No headaches    EXAM:   GENERAL: Pleasant female appearing well in no distress  /77 (BP Location: Left arm, Patient Position: Sitting, Cuff Size: large)   Pulse 61   Temp 98.9 °F (37.2 °C) (Oral)

## 2020-05-16 RX ORDER — ATORVASTATIN CALCIUM 20 MG/1
TABLET, FILM COATED ORAL
Qty: 90 TABLET | Refills: 1 | Status: SHIPPED | OUTPATIENT
Start: 2020-05-16 | End: 2021-02-19

## 2020-05-18 ENCOUNTER — TELEPHONE (OUTPATIENT)
Dept: ORTHOPEDICS CLINIC | Facility: CLINIC | Age: 75
End: 2020-05-18

## 2020-05-18 NOTE — TELEPHONE ENCOUNTER
S/w pt and she states pain started several months ago. Pt denies any injury. Pt had XR. Pt states she did 4 wks of PT, but it didn't help. Pt rates her pain 7/10, mild swelling. appt made on 5/27 @ 140pm with Dr. Timothy Whalen.

## 2020-05-18 NOTE — TELEPHONE ENCOUNTER
Per pt has an appt on 6/17 and is wanting to know if can be seen sooner. States can barely walk on her right knee.  Please advise

## 2020-05-19 ENCOUNTER — APPOINTMENT (OUTPATIENT)
Dept: PHYSICAL THERAPY | Facility: HOSPITAL | Age: 75
End: 2020-05-19
Attending: INTERNAL MEDICINE
Payer: MEDICARE

## 2020-05-22 ENCOUNTER — APPOINTMENT (OUTPATIENT)
Dept: PHYSICAL THERAPY | Facility: HOSPITAL | Age: 75
End: 2020-05-22
Attending: INTERNAL MEDICINE
Payer: MEDICARE

## 2020-05-26 ENCOUNTER — APPOINTMENT (OUTPATIENT)
Dept: PHYSICAL THERAPY | Facility: HOSPITAL | Age: 75
End: 2020-05-26
Attending: INTERNAL MEDICINE
Payer: MEDICARE

## 2020-05-26 ENCOUNTER — TELEPHONE (OUTPATIENT)
Dept: OTOLARYNGOLOGY | Facility: CLINIC | Age: 75
End: 2020-05-26

## 2020-05-27 ENCOUNTER — OFFICE VISIT (OUTPATIENT)
Dept: OTOLARYNGOLOGY | Facility: CLINIC | Age: 75
End: 2020-05-27
Payer: MEDICARE

## 2020-05-27 ENCOUNTER — OFFICE VISIT (OUTPATIENT)
Dept: ORTHOPEDICS CLINIC | Facility: CLINIC | Age: 75
End: 2020-05-27
Payer: MEDICARE

## 2020-05-27 ENCOUNTER — OFFICE VISIT (OUTPATIENT)
Dept: AUDIOLOGY | Facility: CLINIC | Age: 75
End: 2020-05-27
Payer: MEDICARE

## 2020-05-27 VITALS
BODY MASS INDEX: 28.54 KG/M2 | DIASTOLIC BLOOD PRESSURE: 74 MMHG | HEIGHT: 67.5 IN | TEMPERATURE: 98 F | SYSTOLIC BLOOD PRESSURE: 105 MMHG | WEIGHT: 184 LBS

## 2020-05-27 VITALS — WEIGHT: 184 LBS | BODY MASS INDEX: 28.54 KG/M2 | HEIGHT: 67.5 IN

## 2020-05-27 DIAGNOSIS — M17.11 PRIMARY OSTEOARTHRITIS OF RIGHT KNEE: Primary | ICD-10-CM

## 2020-05-27 DIAGNOSIS — H61.22 IMPACTED CERUMEN OF LEFT EAR: ICD-10-CM

## 2020-05-27 DIAGNOSIS — H90.3 SENSORINEURAL HEARING LOSS (SNHL) OF BOTH EARS: ICD-10-CM

## 2020-05-27 DIAGNOSIS — H93.13 TINNITUS OF BOTH EARS: Primary | ICD-10-CM

## 2020-05-27 DIAGNOSIS — H93.19 TINNITUS, UNSPECIFIED LATERALITY: Primary | ICD-10-CM

## 2020-05-27 PROCEDURE — 99203 OFFICE O/P NEW LOW 30 MIN: CPT | Performed by: ORTHOPAEDIC SURGERY

## 2020-05-27 PROCEDURE — 99213 OFFICE O/P EST LOW 20 MIN: CPT | Performed by: OTOLARYNGOLOGY

## 2020-05-27 PROCEDURE — 92557 COMPREHENSIVE HEARING TEST: CPT | Performed by: AUDIOLOGIST

## 2020-05-27 PROCEDURE — 92567 TYMPANOMETRY: CPT | Performed by: AUDIOLOGIST

## 2020-05-27 PROCEDURE — 20610 DRAIN/INJ JOINT/BURSA W/O US: CPT | Performed by: ORTHOPAEDIC SURGERY

## 2020-05-27 RX ORDER — TRIAMCINOLONE ACETONIDE 40 MG/ML
40 INJECTION, SUSPENSION INTRA-ARTICULAR; INTRAMUSCULAR ONCE
Status: COMPLETED | OUTPATIENT
Start: 2020-05-27 | End: 2020-05-27

## 2020-05-27 RX ADMIN — TRIAMCINOLONE ACETONIDE 40 MG: 40 INJECTION, SUSPENSION INTRA-ARTICULAR; INTRAMUSCULAR at 14:30:00

## 2020-05-27 NOTE — PROGRESS NOTES
Teena Tavares is a 67year old female.  Patient presents with:  Ear Problem: pt presents with bilateral plugged ears, buzzing sound in both ears         HISTORY OF PRESENT ILLNESS  6/12/2019   Here for evaluation of bilateral pressure plugging  hearing lo file        Gets together: Not on file        Attends Anabaptism service: Not on file        Active member of club or organization: Not on file        Attends meetings of clubs or organizations: Not on file        Relationship status: Not on file      Intim PHLEBECTOMY, VARICOSE VEINS, 1 EXTREMITY; <20 INCISIONS Right          REVIEW OF SYSTEMS    System Neg/Pos Details   Constitutional Negative fever, weight loss. ENMT Negative Headaches vertigo    Eyes Negative Blurred vision and vision changes.    Aida Winchester all this was discussed in detail with the patient and I showed her her audiogram in detail. PROCEDURE: After informed consent was obtained, the patients ears were examined under the operating microscope.  Cerumen impaction was removed from the left ea medically contraindicated and call basilar symptoms not completely resolved.     Bernadine Olson MD    /5/27/2020

## 2020-05-27 NOTE — PROGRESS NOTES
Per verbal order from Dr. Sadie De Los Santos, draw up and 4ml of 0.5% Marcaine and 1ml of Kenalog 40 for injection into right knee. Parviz March RN  Patient provided education handout for cortisone injection.    Patient left office prior to obtaining post injection alyssa

## 2020-05-27 NOTE — PROGRESS NOTES
NURSING INTAKE COMMENTS: Patient presents with:  Consult: right knee pain -- Tried PT. No cortisone injection tried. Rates pain 9/10. HPI: This 67year old female presents today with complaints of knee pain that is 9 out of 10 in severity.   She has d LEFT EYE QID  0   • ketorolac tromethamine 0.5 % Ophthalmic Solution PLACE 1 DROP IN RIGHT EYE QID  0   • ofloxacin 0.3 % Ophthalmic Solution INSTILL 1 DROP IN LEFT EYE QID.  START EYE DROPS ONE DAY AFTER SURGERY  0   • aspirin 325 MG Oral Tab Take 325 mg b of severe allergy or asthma    Physical Examination:    Ht 5' 7.5\" (1.715 m)   Wt 184 lb (83.5 kg)   BMI 28.39 kg/m²   Constitutional: appears well hydrated, alert and responsive, no acute distress noted  Extremities: She walks with a markedly antalgic ga triamcinolone acetonide (KENALOG-40) 40 MG/ML injection 40 mg    Procedure: The risks and benefits of a cortisone injection were discussed with the patient. An informational sheet was also provided and the patient had ample time to review it.   Under ster

## 2020-06-05 ENCOUNTER — TELEPHONE (OUTPATIENT)
Dept: INTERNAL MEDICINE CLINIC | Facility: CLINIC | Age: 75
End: 2020-06-05

## 2020-06-10 ENCOUNTER — OFFICE VISIT (OUTPATIENT)
Dept: INTERNAL MEDICINE CLINIC | Facility: CLINIC | Age: 75
End: 2020-06-10
Payer: MEDICARE

## 2020-06-10 ENCOUNTER — APPOINTMENT (OUTPATIENT)
Dept: LAB | Facility: HOSPITAL | Age: 75
End: 2020-06-10
Attending: INTERNAL MEDICINE
Payer: MEDICARE

## 2020-06-10 VITALS
HEIGHT: 67.5 IN | BODY MASS INDEX: 29.47 KG/M2 | RESPIRATION RATE: 16 BRPM | SYSTOLIC BLOOD PRESSURE: 113 MMHG | WEIGHT: 190 LBS | DIASTOLIC BLOOD PRESSURE: 72 MMHG | HEART RATE: 62 BPM

## 2020-06-10 DIAGNOSIS — N18.30 CHRONIC KIDNEY DISEASE, STAGE III (MODERATE) (HCC): ICD-10-CM

## 2020-06-10 DIAGNOSIS — M17.11 PRIMARY OSTEOARTHRITIS OF RIGHT KNEE: ICD-10-CM

## 2020-06-10 DIAGNOSIS — E78.00 HYPERCHOLESTEROLEMIA: ICD-10-CM

## 2020-06-10 DIAGNOSIS — Z86.010 HISTORY OF COLON POLYPS: ICD-10-CM

## 2020-06-10 DIAGNOSIS — Z00.00 ANNUAL PHYSICAL EXAM: ICD-10-CM

## 2020-06-10 DIAGNOSIS — I70.0 AORTIC ATHEROSCLEROSIS (HCC): ICD-10-CM

## 2020-06-10 DIAGNOSIS — Z00.00 ANNUAL PHYSICAL EXAM: Primary | ICD-10-CM

## 2020-06-10 DIAGNOSIS — Z00.00 ENCOUNTER FOR ANNUAL HEALTH EXAMINATION: ICD-10-CM

## 2020-06-10 DIAGNOSIS — Z86.718 HISTORY OF DVT (DEEP VEIN THROMBOSIS): ICD-10-CM

## 2020-06-10 DIAGNOSIS — K21.9 GASTROESOPHAGEAL REFLUX DISEASE WITHOUT ESOPHAGITIS: ICD-10-CM

## 2020-06-10 PROCEDURE — 36415 COLL VENOUS BLD VENIPUNCTURE: CPT

## 2020-06-10 PROCEDURE — 96160 PT-FOCUSED HLTH RISK ASSMT: CPT | Performed by: INTERNAL MEDICINE

## 2020-06-10 PROCEDURE — 85027 COMPLETE CBC AUTOMATED: CPT

## 2020-06-10 PROCEDURE — 80061 LIPID PANEL: CPT

## 2020-06-10 PROCEDURE — G0439 PPPS, SUBSEQ VISIT: HCPCS | Performed by: INTERNAL MEDICINE

## 2020-06-10 PROCEDURE — 99397 PER PM REEVAL EST PAT 65+ YR: CPT | Performed by: INTERNAL MEDICINE

## 2020-06-10 PROCEDURE — 80053 COMPREHEN METABOLIC PANEL: CPT

## 2020-06-10 NOTE — PROGRESS NOTES
HPI:   Lukasz Maher is a 67year old female who presents this morning for a MA (Medicare Advantage) Supervisit (Once per calendar year). Her last Medicare physical was May 2019. She feels well today. No specific issues for discussion.   Generalized Not at all  Feeling down, depressed, or hopeless (over the last two weeks)?: Not at all  PHQ-2 SCORE: 0     Advanced Directive:   She does have a Living Will but we do NOT have it on file in [address] [place] [address] Rd.    Not Discussed         She does have a POA but we do NOT h Results   Component Value Date    WBC 3.7 (L) 05/10/2020    HGB 12.9 05/10/2020    .0 05/10/2020        ALLERGIES:   She has No Known Allergies.     CURRENT MEDICATIONS:   ATORVASTATIN 20 MG Oral Tab, TAKE 1 TABLET BY MOUTH EVERY EVENING  prednisoLON SYSTEMS:     REVIEW OF SYSTEMS:   GENERAL: No fever  LUNGS: No cough wheezing or shortness of breath  CARDIAC: No lightheadedness palpitations or chest pain  GI: No anorexia heartburn dysphagia nausea vomiting abdominal pain diarrhea constipation or rectal members and friends have told me they think I may have hearing loss:   No               Visual Acuity  Right Eye Visual Acuity: Uncorrected Right Eye Chart Acuity: 20/30   Left Eye Visual Acuity: Uncorrected Left Eye Chart Acuity: 20/30   Both Eyes Visual A August.  Recommend follow-up colonoscopy. She will consider. Discussed and recommended decreasing aspirin to 81 mg daily. Reinforced annual influenza vaccine. Continue current medications.   Continue healthy diet and regular exercise, maintaining curren Annually LDL Cholesterol (mg/dL)   Date Value   04/10/2019 104 (H)        EKG - w/ Initial Preventative Physical Exam only, or if medically necessary Electrocardiogram date05/10/2020       Colorectal Cancer Screening      Colonoscopy Screen every 10 years Homosexual men   Illicit injectable drug abusers     Tetanus Toxoid  Only covered with a cut with metal- TD and TDaP Not covered by Medicare Part B No vaccine history found This may be covered with your prescription benefits, but Medicare does not cover

## 2020-06-10 NOTE — PATIENT INSTRUCTIONS
Await results of today's blood tests. You will be due for repeat mammogram in August.  Please consider a repeat colonoscopy. Decrease aspirin to 81 mg daily. Continue other medications. Remember to get a flu shot this fall.   Continue healthy diet and r previous visit.  Limited to patients who meet one of the following criteria:   • Men who are 73-68 years old and have smoked more than 100 cigarettes in their lifetime   • Anyone with a family history    Colorectal Cancer Screening  Covered up to Age 76 display for this patient.  Please get this Mammogram regularly   Immunizations      Influenza  Covered Annually Orders placed or performed in visit on 10/17/19   • FLU VACC HIGH DOSE PRSV FREE    Please get every year    Pneumococcal 13 (Prevnar)  Covered [place])  www. [email or web address] org  This link also has information from the [address] [place] [address] regarding Advance Directives.

## 2020-06-15 ENCOUNTER — TELEPHONE (OUTPATIENT)
Dept: ORTHOPEDICS CLINIC | Facility: CLINIC | Age: 75
End: 2020-06-15

## 2020-06-15 RX ORDER — MELOXICAM 15 MG/1
15 TABLET ORAL DAILY
Qty: 30 TABLET | Refills: 0 | Status: SHIPPED | OUTPATIENT
Start: 2020-06-15 | End: 2020-06-18

## 2020-06-15 NOTE — TELEPHONE ENCOUNTER
Pt called stating pt had an appointment 5-27-20.  6-14-20 pt's right leg is numb and painful. Pt applied heating pad. Pt is scheduled for an appointment on 6-17-20. Will you rx. For pain.   Please call

## 2020-06-15 NOTE — TELEPHONE ENCOUNTER
S/w pt and she states LOV 5/27 she had cortisone injection to right knee and it helped a lot. On 6/14 she woke up in severe pain in knee, mild swelling and stiffness. She denies any injury. She states she has been using the stationary bike sometimes.  Pt ma

## 2020-06-17 ENCOUNTER — OFFICE VISIT (OUTPATIENT)
Dept: ORTHOPEDICS CLINIC | Facility: CLINIC | Age: 75
End: 2020-06-17
Payer: MEDICARE

## 2020-06-17 VITALS
HEART RATE: 62 BPM | SYSTOLIC BLOOD PRESSURE: 103 MMHG | BODY MASS INDEX: 29.47 KG/M2 | HEIGHT: 67.5 IN | DIASTOLIC BLOOD PRESSURE: 68 MMHG | WEIGHT: 190 LBS | RESPIRATION RATE: 22 BRPM

## 2020-06-17 DIAGNOSIS — M17.11 PRIMARY OSTEOARTHRITIS OF RIGHT KNEE: Primary | ICD-10-CM

## 2020-06-17 PROCEDURE — 20610 DRAIN/INJ JOINT/BURSA W/O US: CPT | Performed by: PHYSICIAN ASSISTANT

## 2020-06-17 NOTE — PROGRESS NOTES
NURSING INTAKE COMMENTS: Patient presents with:  Knee Pain: Right f/u - had cortisone inj on 5/27/2020 - pain restarted on 6/14/2020 - rates pain 6-10/10 at all the time - has swelling in the knee and pain and radiating pain down the leg and also swelling Dispense Refill   • Meloxicam 15 MG Oral Tab Take 1 tablet (15 mg total) by mouth daily.  30 tablet 0   • ATORVASTATIN 20 MG Oral Tab TAKE 1 TABLET BY MOUTH EVERY EVENING 90 tablet 1   • prednisoLONE acetate 1 % Ophthalmic Suspension INSTILL 1 DROP INTO LEF other musculoskeletal complaints other than in HPI  NEURO: no numbness or tingling, no weakness or balance disorder  PSYCHE: no depression or anxiety  HEMATOLOGIC: no hx of blood dyscrasia  ENDOCRINE: no thyroid or diabetes issues  ALL/ASTHMA: no new hx of medical decision-making. I personally evaluated the patient and performed all key components of the service rendered. I shared my findings with the person documenting this note & approve of the documentation as stated.   Devonte Kirk MD

## 2020-06-18 RX ORDER — MELOXICAM 15 MG/1
TABLET ORAL
Qty: 90 TABLET | Refills: 0 | Status: SHIPPED | OUTPATIENT
Start: 2020-06-18 | End: 2020-10-06

## 2020-06-18 NOTE — TELEPHONE ENCOUNTER
Pt was seen at office visit with Diogo Fragoso, and Tammie yesterday. Pt receive Rx of Meloxicam 15 mg on 06/15/20 # 30 and NRF. -- Dr Cho/BAILEE Davies, pt requesting 90 day Rx of Meloxicam 15 mg tablets. If you approve, please sign order.  Thank you

## 2020-06-24 ENCOUNTER — OFFICE VISIT (OUTPATIENT)
Dept: ORTHOPEDICS CLINIC | Facility: CLINIC | Age: 75
End: 2020-06-24
Payer: MEDICARE

## 2020-06-24 VITALS
HEART RATE: 55 BPM | SYSTOLIC BLOOD PRESSURE: 129 MMHG | WEIGHT: 184 LBS | RESPIRATION RATE: 20 BRPM | DIASTOLIC BLOOD PRESSURE: 75 MMHG | HEIGHT: 67.5 IN | BODY MASS INDEX: 28.54 KG/M2

## 2020-06-24 DIAGNOSIS — M17.11 PRIMARY OSTEOARTHRITIS OF RIGHT KNEE: Primary | ICD-10-CM

## 2020-06-24 PROCEDURE — 20610 DRAIN/INJ JOINT/BURSA W/O US: CPT | Performed by: PHYSICIAN ASSISTANT

## 2020-06-24 NOTE — PROGRESS NOTES
NURSING INTAKE COMMENTS: Patient presents with:  Knee Pain: right -- Orthovisc 2nd injection to right knee. HPI: This 67year old female presents today for her next Orthovisc injection today.   She states that she had a significant amount of improveme ofloxacin 0.3 % Ophthalmic Solution INSTILL 1 DROP IN LEFT EYE QID. START EYE DROPS ONE DAY AFTER SURGERY  0   • aspirin 325 MG Oral Tab Take 325 mg by mouth daily. • Vitamin D3 1000 units Oral Tab Take 1,000 Units by mouth daily.      • Omeprazole 40 M 28.39 kg/m²   Constitutional: appears well hydrated, alert and responsive, no acute distress noted  Extremities: No effusion      Imaging: No results found.      Lab Results   Component Value Date    WBC 4.4 06/10/2020    HGB 13.3 06/10/2020    .0 06

## 2020-06-30 ENCOUNTER — OFFICE VISIT (OUTPATIENT)
Dept: PODIATRY CLINIC | Facility: CLINIC | Age: 75
End: 2020-06-30
Payer: MEDICARE

## 2020-06-30 DIAGNOSIS — M79.672 PAIN IN BOTH FEET: Primary | ICD-10-CM

## 2020-06-30 DIAGNOSIS — M79.671 PAIN IN BOTH FEET: Primary | ICD-10-CM

## 2020-06-30 DIAGNOSIS — L84 FOOT CALLUS: ICD-10-CM

## 2020-06-30 PROCEDURE — 11056 PARNG/CUTG B9 HYPRKR LES 2-4: CPT | Performed by: PODIATRIST

## 2020-06-30 NOTE — PROGRESS NOTES
HPI:    Patient ID: Ora Newsome is a 67year old female. This 50-year-old female presents to the office today having not been seen in almost 3 years. Patient's chief complaint is pain across the ball of both of her feet.   She is aware of chronic call findings of this evaluation and discussed options of care. Sharp blade debridement of areas of callus on the ball of both feet was accomplished and there is no open or draining no sign of infection.   I anticipate relief but more than likely this will be t

## 2020-07-01 ENCOUNTER — OFFICE VISIT (OUTPATIENT)
Dept: ORTHOPEDICS CLINIC | Facility: CLINIC | Age: 75
End: 2020-07-01
Payer: MEDICARE

## 2020-07-01 VITALS — HEART RATE: 58 BPM | SYSTOLIC BLOOD PRESSURE: 99 MMHG | RESPIRATION RATE: 18 BRPM | DIASTOLIC BLOOD PRESSURE: 61 MMHG

## 2020-07-01 DIAGNOSIS — M17.11 PRIMARY OSTEOARTHRITIS OF RIGHT KNEE: Primary | ICD-10-CM

## 2020-07-01 PROCEDURE — 20610 DRAIN/INJ JOINT/BURSA W/O US: CPT | Performed by: PHYSICIAN ASSISTANT

## 2020-07-01 NOTE — PROGRESS NOTES
NURSING INTAKE COMMENTS: Patient presents with:  Knee Pain: Right f/u - here for Ortho Visc inj #3 - has some pain on and off rated as 2/10       HPI: This 67year old female presents today for her third Orthovisc injection.   She is noted significant impro • ofloxacin 0.3 % Ophthalmic Solution INSTILL 1 DROP IN LEFT EYE QID. START EYE DROPS ONE DAY AFTER SURGERY  0   • aspirin 325 MG Oral Tab Take 325 mg by mouth daily. • Vitamin D3 1000 units Oral Tab Take 1,000 Units by mouth daily.      • Omeprazole Resp 18   Constitutional: appears well hydrated, alert and responsive, no acute distress noted  Extremities: No palpable effusion. Imaging: No results found.      Lab Results   Component Value Date    WBC 4.4 06/10/2020    HGB 13.3 06/10/2020    PLT 19

## 2020-07-08 ENCOUNTER — OFFICE VISIT (OUTPATIENT)
Dept: ORTHOPEDICS CLINIC | Facility: CLINIC | Age: 75
End: 2020-07-08
Payer: MEDICARE

## 2020-07-08 VITALS — RESPIRATION RATE: 20 BRPM | SYSTOLIC BLOOD PRESSURE: 106 MMHG | HEART RATE: 59 BPM | DIASTOLIC BLOOD PRESSURE: 66 MMHG

## 2020-07-08 DIAGNOSIS — M17.11 PRIMARY OSTEOARTHRITIS OF RIGHT KNEE: Primary | ICD-10-CM

## 2020-07-08 PROCEDURE — 20610 DRAIN/INJ JOINT/BURSA W/O US: CPT | Performed by: PHYSICIAN ASSISTANT

## 2020-07-08 NOTE — PROGRESS NOTES
NURSING INTAKE COMMENTS: Patient presents with:  Knee Pain: Right f/u - here for Ortho Visc inj #4 - states she is good - no pain       HPI: This 67year old female presents today for her fourth Orthovisc injection.   She states that her knee is doing very ofloxacin 0.3 % Ophthalmic Solution INSTILL 1 DROP IN LEFT EYE QID. START EYE DROPS ONE DAY AFTER SURGERY  0   • aspirin 325 MG Oral Tab Take 325 mg by mouth daily. • Vitamin D3 1000 units Oral Tab Take 1,000 Units by mouth daily.      • Omeprazole 40 M Resp 20   Constitutional: appears well hydrated, alert and responsive, no acute distress noted  Extremities: Palpable effusion. No redness or warmth. Imaging: No results found.      Lab Results   Component Value Date    WBC 4.4 06/10/2020    HGB 13.3 06/1

## 2020-07-21 ENCOUNTER — TELEPHONE (OUTPATIENT)
Dept: INTERNAL MEDICINE CLINIC | Facility: CLINIC | Age: 75
End: 2020-07-21

## 2020-07-21 DIAGNOSIS — R92.8 ABNORMAL SCREENING MAMMOGRAM: Primary | ICD-10-CM

## 2020-07-21 NOTE — TELEPHONE ENCOUNTER
Dr. Simon Rankin, patient is requesting a mammogram order. Last mammogram was completed 02/13/2020. Please advise on order. oval lesions left 1 and 6 o'clock. Recommend six-month follow-up left diagnostic mammogram and ultrasound. BI-RADS CATEGORY:     DIAGNOSTIC CATEGORY 3--PROBABLY BENIGN FINDING. RECOMMENDATIONS:   SHORT TERM FOLLOW-UP DIAGNOSTIC MAMMOGRAM LEFT BREAST IN 6 MONTHS. SHORT TERM FOLLOW-UP ULTRASOUND LEFT BREAST IN 6 MONTHS.

## 2020-08-06 ENCOUNTER — TELEPHONE (OUTPATIENT)
Dept: INTERNAL MEDICINE CLINIC | Facility: CLINIC | Age: 75
End: 2020-08-06

## 2020-08-06 DIAGNOSIS — Z01.00 EYE EXAM, ROUTINE: Primary | ICD-10-CM

## 2020-08-06 NOTE — TELEPHONE ENCOUNTER
Patient is requesting a referral for ophthalmologist Dr. Merari Knox. Yearly check up is scheduled for 09/16.

## 2020-08-06 NOTE — TELEPHONE ENCOUNTER
Dr. Yee Bain, patient is requesting a referral to Dr. Evelio Swain. Referral has been pended, please advise.

## 2020-08-12 ENCOUNTER — HOSPITAL ENCOUNTER (OUTPATIENT)
Dept: MAMMOGRAPHY | Facility: HOSPITAL | Age: 75
Discharge: HOME | End: 2020-08-12
Attending: INTERNAL MEDICINE
Payer: MEDICARE

## 2020-08-12 ENCOUNTER — HOSPITAL ENCOUNTER (OUTPATIENT)
Dept: ULTRASOUND IMAGING | Facility: HOSPITAL | Age: 75
Discharge: HOME | End: 2020-08-12
Attending: INTERNAL MEDICINE
Payer: MEDICARE

## 2020-08-12 DIAGNOSIS — R92.8 ABNORMAL SCREENING MAMMOGRAM: ICD-10-CM

## 2020-08-12 PROCEDURE — 76642 ULTRASOUND BREAST LIMITED: CPT | Performed by: INTERNAL MEDICINE

## 2020-08-12 PROCEDURE — 77065 DX MAMMO INCL CAD UNI: CPT | Performed by: INTERNAL MEDICINE

## 2020-08-12 PROCEDURE — 77061 BREAST TOMOSYNTHESIS UNI: CPT | Performed by: INTERNAL MEDICINE

## 2020-08-17 ENCOUNTER — TELEPHONE (OUTPATIENT)
Dept: INTERNAL MEDICINE CLINIC | Facility: CLINIC | Age: 75
End: 2020-08-17

## 2020-08-17 DIAGNOSIS — Z86.010 HISTORY OF COLON POLYPS: Primary | ICD-10-CM

## 2020-08-17 DIAGNOSIS — M17.11 PRIMARY OSTEOARTHRITIS OF RIGHT KNEE: ICD-10-CM

## 2020-08-17 NOTE — TELEPHONE ENCOUNTER
Dr. Mari Hodge, patient is requesting a referral to Dr. Dez Pickard. Referral has been pended, please advise.

## 2020-08-24 ENCOUNTER — HOSPITAL ENCOUNTER (OUTPATIENT)
Dept: ULTRASOUND IMAGING | Facility: HOSPITAL | Age: 75
Discharge: HOME | End: 2020-08-24
Attending: ORTHOPAEDIC SURGERY
Payer: MEDICARE

## 2020-08-24 ENCOUNTER — OFFICE VISIT (OUTPATIENT)
Dept: ORTHOPEDICS CLINIC | Facility: CLINIC | Age: 75
End: 2020-08-24
Payer: MEDICARE

## 2020-08-24 VITALS — WEIGHT: 184 LBS | HEIGHT: 67.5 IN | BODY MASS INDEX: 28.54 KG/M2

## 2020-08-24 DIAGNOSIS — M79.89 CALF SWELLING: Primary | ICD-10-CM

## 2020-08-24 DIAGNOSIS — M79.661 RIGHT CALF PAIN: ICD-10-CM

## 2020-08-24 DIAGNOSIS — M79.89 CALF SWELLING: ICD-10-CM

## 2020-08-24 DIAGNOSIS — M17.11 PRIMARY OSTEOARTHRITIS OF RIGHT KNEE: ICD-10-CM

## 2020-08-24 PROCEDURE — 3008F BODY MASS INDEX DOCD: CPT | Performed by: ORTHOPAEDIC SURGERY

## 2020-08-24 PROCEDURE — 99214 OFFICE O/P EST MOD 30 MIN: CPT | Performed by: ORTHOPAEDIC SURGERY

## 2020-08-24 PROCEDURE — 93971 EXTREMITY STUDY: CPT | Performed by: ORTHOPAEDIC SURGERY

## 2020-08-24 NOTE — PROGRESS NOTES
NURSING INTAKE COMMENTS: Patient presents with:  Knee Pain: Right- pt states pain/swelling came back 1 wk ago. HPI: This 67year old female presents today with complaints of pain.   She had a series of Hyaluronate injections a month ago which did not 1 DROP IN LEFT EYE QID. START EYE DROPS ONE DAY AFTER SURGERY  0   • aspirin 325 MG Oral Tab Take 325 mg by mouth daily. • Vitamin D3 1000 units Oral Tab Take 1,000 Units by mouth daily.      • Omeprazole 40 MG Oral Capsule Delayed Release Take 40 mg by Constitutional: appears well hydrated, alert and responsive, no acute distress noted  Extremities: Small effusion in the right knee. Painful range of motion from 0 to 110 degrees. Negative Homans test.  Mild calf swelling and tenderness.   Neurological: significantly changed in appearance compared to the prior ultrasound. This has been followed since February 2020. There is no internal vascularity. 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   Short-term six-month follow-up diagnostic imaging is recommende [facility] 2D+3D DIAGNOSTIC [facility] LEFT (CPT=[id number]), 8/12/2020, 9:04 AM.  [facility]. [place], [place] BREAST LEFT LIMITED  (LZL=[id number]), 2/13/2020, 9:27 AM.  INDICATIONS: Inconclusive mammogram  TECHNIQUE:  Breast ultrasound was perform

## 2020-08-25 ENCOUNTER — LAB ENCOUNTER (OUTPATIENT)
Dept: LAB | Facility: HOSPITAL | Age: 75
End: 2020-08-25
Attending: INTERNAL MEDICINE
Payer: MEDICARE

## 2020-08-25 ENCOUNTER — OFFICE VISIT (OUTPATIENT)
Dept: INTERNAL MEDICINE CLINIC | Facility: CLINIC | Age: 75
End: 2020-08-25
Payer: MEDICARE

## 2020-08-25 VITALS
HEART RATE: 56 BPM | BODY MASS INDEX: 24.57 KG/M2 | HEIGHT: 67.5 IN | DIASTOLIC BLOOD PRESSURE: 78 MMHG | RESPIRATION RATE: 16 BRPM | SYSTOLIC BLOOD PRESSURE: 128 MMHG | WEIGHT: 158.38 LBS

## 2020-08-25 DIAGNOSIS — N18.30 CHRONIC KIDNEY DISEASE, STAGE III (MODERATE) (HCC): ICD-10-CM

## 2020-08-25 DIAGNOSIS — I80.01 SUPERFICIAL THROMBOPHLEBITIS OF RIGHT LEG: Primary | ICD-10-CM

## 2020-08-25 LAB
ANION GAP SERPL CALC-SCNC: 7 MMOL/L (ref 0–18)
BUN BLD-MCNC: 19 MG/DL (ref 7–18)
BUN/CREAT SERPL: 13.5 (ref 10–20)
CALCIUM BLD-MCNC: 9.3 MG/DL (ref 8.5–10.1)
CHLORIDE SERPL-SCNC: 107 MMOL/L (ref 98–112)
CO2 SERPL-SCNC: 26 MMOL/L (ref 21–32)
CREAT BLD-MCNC: 1.41 MG/DL (ref 0.55–1.02)
GLUCOSE BLD-MCNC: 79 MG/DL (ref 70–99)
OSMOLALITY SERPL CALC.SUM OF ELEC: 291 MOSM/KG (ref 275–295)
PATIENT FASTING Y/N/NP: NO
POTASSIUM SERPL-SCNC: 4.9 MMOL/L (ref 3.5–5.1)
SODIUM SERPL-SCNC: 140 MMOL/L (ref 136–145)

## 2020-08-25 PROCEDURE — 3078F DIAST BP <80 MM HG: CPT | Performed by: INTERNAL MEDICINE

## 2020-08-25 PROCEDURE — 3008F BODY MASS INDEX DOCD: CPT | Performed by: INTERNAL MEDICINE

## 2020-08-25 PROCEDURE — 99213 OFFICE O/P EST LOW 20 MIN: CPT | Performed by: INTERNAL MEDICINE

## 2020-08-25 PROCEDURE — 3074F SYST BP LT 130 MM HG: CPT | Performed by: INTERNAL MEDICINE

## 2020-08-25 PROCEDURE — 80048 BASIC METABOLIC PNL TOTAL CA: CPT

## 2020-08-25 PROCEDURE — 36415 COLL VENOUS BLD VENIPUNCTURE: CPT

## 2020-08-25 RX ORDER — NAPROXEN 500 MG/1
500 TABLET ORAL 2 TIMES DAILY WITH MEALS
Qty: 30 TABLET | Refills: 0 | Status: SHIPPED | OUTPATIENT
Start: 2020-08-25 | End: 2020-09-16

## 2020-08-25 NOTE — PROGRESS NOTES
Crystal Mariano is a 67year old female.  Patient presents with:  Test Results: pt had US of right leg for suspected DVT-pt was advised to see her PCP     HPI:   For the past 1 week she has had persistent and progressively severe pain affecting her right me extremities      Past Surgical History:   Procedure Laterality Date   • BIOPSY OF BREAST, NEEDLE CORE Left 01/24/2008    Fibroadenoma   • BIOPSY OF SKIN LESION  06/20/2018    Excision upper mid back epidermal inclusion cyst   • CATARACT Right 02/2019   • C warm compresses 3-4 times daily. Short-term naproxen 500 mg twice daily with meals. Prescription sent to pharmacy. Call if not soon better. - naproxen 500 MG Oral Tab; Take 1 tablet (500 mg total) by mouth 2 (two) times daily with meals.   Dispense: 30

## 2020-09-01 ENCOUNTER — HOSPITAL ENCOUNTER (OUTPATIENT)
Dept: MRI IMAGING | Facility: HOSPITAL | Age: 75
Discharge: HOME | End: 2020-09-01
Attending: ORTHOPAEDIC SURGERY
Payer: MEDICARE

## 2020-09-01 DIAGNOSIS — M79.661 RIGHT CALF PAIN: ICD-10-CM

## 2020-09-01 DIAGNOSIS — M79.89 CALF SWELLING: ICD-10-CM

## 2020-09-01 PROCEDURE — 73721 MRI JNT OF LWR EXTRE W/O DYE: CPT | Performed by: ORTHOPAEDIC SURGERY

## 2020-09-02 ENCOUNTER — TELEPHONE (OUTPATIENT)
Dept: ORTHOPEDICS CLINIC | Facility: CLINIC | Age: 75
End: 2020-09-02

## 2020-09-02 DIAGNOSIS — Z01.818 PREOPERATIVE EXAMINATION: Primary | ICD-10-CM

## 2020-09-02 NOTE — TELEPHONE ENCOUNTER
Left message for pt to call back to go over pre op check list and med list. Surgery form sent to Dr Tidwell's surgery scheduler.

## 2020-09-02 NOTE — TELEPHONE ENCOUNTER
Spoke with pt in regards to surgery with Dr Leeann Bonner, review pre op check list and med list, medical and dental clearance must be within 30 days of surgery, pt verbalized understanding. All questions and concerns answered.

## 2020-09-14 NOTE — TELEPHONE ENCOUNTER
Surgery information --  · Surgery: Total right knee arthropasty  · Date:10/9/20  · Location:[facility]  · PA: Authorized  · Emailed Medacta-submitted   · Medical Clearance --pending  · Dental Clearance--cleared

## 2020-09-15 NOTE — TELEPHONE ENCOUNTER
Patient has an appt with pcp 9/16 for pre op clearance . Called pt up to inform her she dental clearance --lvm to call the office.

## 2020-09-16 ENCOUNTER — OFFICE VISIT (OUTPATIENT)
Dept: ORTHOPEDICS CLINIC | Facility: CLINIC | Age: 75
End: 2020-09-16
Payer: MEDICARE

## 2020-09-16 ENCOUNTER — OFFICE VISIT (OUTPATIENT)
Dept: INTERNAL MEDICINE CLINIC | Facility: CLINIC | Age: 75
End: 2020-09-16
Payer: MEDICARE

## 2020-09-16 VITALS
SYSTOLIC BLOOD PRESSURE: 129 MMHG | WEIGHT: 195 LBS | RESPIRATION RATE: 18 BRPM | HEART RATE: 54 BPM | HEIGHT: 67.5 IN | DIASTOLIC BLOOD PRESSURE: 74 MMHG | BODY MASS INDEX: 30.25 KG/M2

## 2020-09-16 VITALS — WEIGHT: 195 LBS | BODY MASS INDEX: 30.25 KG/M2 | HEIGHT: 67.5 IN

## 2020-09-16 DIAGNOSIS — M17.11 PRIMARY OSTEOARTHRITIS OF RIGHT KNEE: ICD-10-CM

## 2020-09-16 DIAGNOSIS — Z01.818 PREOPERATIVE EXAMINATION: Primary | ICD-10-CM

## 2020-09-16 DIAGNOSIS — M17.11 PRIMARY OSTEOARTHRITIS OF RIGHT KNEE: Primary | ICD-10-CM

## 2020-09-16 PROCEDURE — 3008F BODY MASS INDEX DOCD: CPT | Performed by: ORTHOPAEDIC SURGERY

## 2020-09-16 PROCEDURE — 90653 IIV ADJUVANT VACCINE IM: CPT | Performed by: INTERNAL MEDICINE

## 2020-09-16 PROCEDURE — 99212 OFFICE O/P EST SF 10 MIN: CPT | Performed by: ORTHOPAEDIC SURGERY

## 2020-09-16 PROCEDURE — 3074F SYST BP LT 130 MM HG: CPT | Performed by: INTERNAL MEDICINE

## 2020-09-16 PROCEDURE — 3078F DIAST BP <80 MM HG: CPT | Performed by: INTERNAL MEDICINE

## 2020-09-16 PROCEDURE — 3008F BODY MASS INDEX DOCD: CPT | Performed by: INTERNAL MEDICINE

## 2020-09-16 PROCEDURE — 99214 OFFICE O/P EST MOD 30 MIN: CPT | Performed by: INTERNAL MEDICINE

## 2020-09-16 PROCEDURE — G0008 ADMIN INFLUENZA VIRUS VAC: HCPCS | Performed by: INTERNAL MEDICINE

## 2020-09-16 NOTE — H&P
Keri Steen is a 67year old female who presents this morning, at Dr. Radha Saldana' request, for preoperative medical evaluation and clearance.   HPI:   She has ongoing severe pain and swelling in her right knee which significantly limits normal activitie Dispense Refill   • ATORVASTATIN 20 MG Oral Tab TAKE 1 TABLET BY MOUTH EVERY EVENING 90 tablet 1   • aspirin 325 MG Oral Tab Take 325 mg by mouth daily. • Vitamin D3 1000 units Oral Tab Take 1,000 Units by mouth daily.      • Omeprazole 40 MG Oral Capsu Neg    • Ovarian Cancer Neg       Social History:   Social History    Tobacco Use      Smoking status: Former Smoker        Packs/day: 0.50        Years: 2.00        Pack years: 1        Types: Cigarettes        Quit date: 11/17/1975        Years since [employer] risk for postoperative DVT, and anticipate postoperative anticoagulation for DVT prophylaxis. 2. Primary osteoarthritis of right knee  Anticipate right TKA as above. Preoperative labs reviewed. CMP normal except BUN 15 creatinine 1.27, stable.   CBC

## 2020-09-16 NOTE — PROGRESS NOTES
NURSING INTAKE COMMENTS: Patient presents with:  Pre-Op Exam: Right TKA scheduled for 10/9/2020- she was seen by PCP and she still has to go back to dentist next week for a rooth cannal - she still has pain rated as 5-6/10 with movement and she still has s MG Oral Tab TAKE 1 TABLET(15 MG) BY MOUTH DAILY (Patient not taking: Reported on 9/16/2020) 90 tablet 0   • ATORVASTATIN 20 MG Oral Tab TAKE 1 TABLET BY MOUTH EVERY EVENING 90 tablet 1   • aspirin 325 MG Oral Tab Take 325 mg by mouth daily.      • Vitamin D severe allergy or asthma    Physical Examination:    Ht 5' 7.5\" (1.715 m)   Wt 195 lb (88.5 kg)   BMI 30.09 kg/m²   Constitutional: appears well hydrated, alert and responsive, no acute distress noted    Imaging: Mri Knee, Right ([id number])    Result Date the meantime. The above note was creating using Dragon speech recognition technology. Please excuse any typos.     This note was scribed by Leah Pope PA-C for Dr. Radha Saldana who was present for the patient's evaluation and performed all medical decision-

## 2020-09-16 NOTE — PATIENT INSTRUCTIONS
You received a high-dose flu shot today. You are medically stable for your scheduled right knee replacement surgery assuming all preoperative tests are normal.  Please stop aspirin and avoid anti-inflammatory medication for 1 week before surgery.

## 2020-09-21 ENCOUNTER — MED REC SCAN ONLY (OUTPATIENT)
Dept: INTERNAL MEDICINE CLINIC | Facility: CLINIC | Age: 75
End: 2020-09-21

## 2020-09-22 ENCOUNTER — TELEPHONE (OUTPATIENT)
Dept: INTERNAL MEDICINE CLINIC | Facility: CLINIC | Age: 75
End: 2020-09-22

## 2020-09-22 NOTE — TELEPHONE ENCOUNTER
Dr. Deidra Cunningham and IM staff      Patient  is having surgery on 10/9/20 with Dr. Leeann Bonner for a right total knee    Patient will need to have   · EKG  · Xray  · CBC  · CMP  · PT  · PTT  · Urinalysis  · Urine Culture  · MRSA    Per the [facility] OR labs and testing need

## 2020-09-22 NOTE — TELEPHONE ENCOUNTER
Called pt up informed her that she needs dental clearance. Patient states she is going dentist tomorrow.

## 2020-09-22 NOTE — TELEPHONE ENCOUNTER
CMP CBC PT PTT urinalysis with reflex culture MRSA PCR testing and EKG ordered. Chest x-ray not ordered–not necessary. Please inform patient.

## 2020-09-22 NOTE — TELEPHONE ENCOUNTER
Patient called, stating that she was told she needed to have her EKG, chest xray, and blood work redone. She is requesting orders for these.

## 2020-09-23 NOTE — TELEPHONE ENCOUNTER
Called patient and informed of MD comments  Pt verbalized understanding and will call to schedule pre-op testing

## 2020-09-30 ENCOUNTER — LAB ENCOUNTER (OUTPATIENT)
Dept: LAB | Facility: HOSPITAL | Age: 75
End: 2020-09-30
Attending: INTERNAL MEDICINE
Payer: MEDICARE

## 2020-09-30 DIAGNOSIS — Z01.818 PREOPERATIVE EXAMINATION: ICD-10-CM

## 2020-09-30 DIAGNOSIS — Z01.818 PREOP EXAMINATION: Primary | ICD-10-CM

## 2020-09-30 PROCEDURE — 36415 COLL VENOUS BLD VENIPUNCTURE: CPT

## 2020-09-30 PROCEDURE — 85610 PROTHROMBIN TIME: CPT

## 2020-09-30 PROCEDURE — 93010 ELECTROCARDIOGRAM REPORT: CPT | Performed by: INTERNAL MEDICINE

## 2020-09-30 PROCEDURE — 87641 MR-STAPH DNA AMP PROBE: CPT

## 2020-09-30 PROCEDURE — 81001 URINALYSIS AUTO W/SCOPE: CPT

## 2020-09-30 PROCEDURE — 85027 COMPLETE CBC AUTOMATED: CPT

## 2020-09-30 PROCEDURE — 87640 STAPH A DNA AMP PROBE: CPT

## 2020-09-30 PROCEDURE — 93005 ELECTROCARDIOGRAM TRACING: CPT

## 2020-09-30 PROCEDURE — 85730 THROMBOPLASTIN TIME PARTIAL: CPT

## 2020-09-30 PROCEDURE — 80053 COMPREHEN METABOLIC PANEL: CPT

## 2020-09-30 PROCEDURE — 87086 URINE CULTURE/COLONY COUNT: CPT

## 2020-10-06 ENCOUNTER — APPOINTMENT (OUTPATIENT)
Dept: LAB | Facility: HOSPITAL | Age: 75
DRG: 470 | End: 2020-10-06
Attending: ORTHOPAEDIC SURGERY
Payer: MEDICARE

## 2020-10-06 DIAGNOSIS — Z01.818 PREOP TESTING: ICD-10-CM

## 2020-10-06 PROCEDURE — 86900 BLOOD TYPING SEROLOGIC ABO: CPT

## 2020-10-06 PROCEDURE — 86850 RBC ANTIBODY SCREEN: CPT

## 2020-10-06 PROCEDURE — 86901 BLOOD TYPING SEROLOGIC RH(D): CPT

## 2020-10-06 PROCEDURE — 36415 COLL VENOUS BLD VENIPUNCTURE: CPT

## 2020-10-08 RX ORDER — TRANEXAMIC ACID 10 MG/ML
1000 INJECTION, SOLUTION INTRAVENOUS ONCE
Status: COMPLETED | OUTPATIENT
Start: 2020-10-09 | End: 2020-10-09

## 2020-10-08 NOTE — H&P
[address] [place] [address] Patient Status:  Surgery Admit Carito Steven    1945 MRN [id number]   Location [place] [address] Attending Alberto Ewing, *   Hosp Day # 0 PCP Manisha Cloud MD     Ectopic pregnancy   • STAB PHLEBECTOMY, VARICOSE VEINS, 1 EXTREMITY; <20 INCISIONS Right      Family History   Problem Relation Age of Onset   • Colon Cancer Father          age 28   • Hypertension Mother    • Glaucoma Mother    • Diabetes Sist She has osteoarthritis of the right knee that has not responded to conservative management.       Plan: She wants to proceed with total knee arthroplasty. We reviewed the risks and indications.   She is at increased risk for DVT following surgery because o

## 2020-10-09 ENCOUNTER — HOSPITAL ENCOUNTER (INPATIENT)
Facility: HOSPITAL | Age: 75
LOS: 2 days | Discharge: HOME HEALTH | DRG: 470 | End: 2020-10-11
Attending: ORTHOPAEDIC SURGERY | Admitting: ORTHOPAEDIC SURGERY
Payer: MEDICARE

## 2020-10-09 ENCOUNTER — ANESTHESIA EVENT (OUTPATIENT)
Dept: SURGERY | Facility: HOSPITAL | Age: 75
DRG: 470 | End: 2020-10-09
Payer: MEDICARE

## 2020-10-09 ENCOUNTER — APPOINTMENT (OUTPATIENT)
Dept: GENERAL RADIOLOGY | Facility: HOSPITAL | Age: 75
DRG: 470 | End: 2020-10-09
Attending: PHYSICIAN ASSISTANT
Payer: MEDICARE

## 2020-10-09 ENCOUNTER — ANESTHESIA (OUTPATIENT)
Dept: SURGERY | Facility: HOSPITAL | Age: 75
DRG: 470 | End: 2020-10-09
Payer: MEDICARE

## 2020-10-09 DIAGNOSIS — Z01.818 PREOP TESTING: Primary | ICD-10-CM

## 2020-10-09 PROCEDURE — 99232 SBSQ HOSP IP/OBS MODERATE 35: CPT | Performed by: HOSPITALIST

## 2020-10-09 PROCEDURE — 73560 X-RAY EXAM OF KNEE 1 OR 2: CPT | Performed by: PHYSICIAN ASSISTANT

## 2020-10-09 PROCEDURE — 3008F BODY MASS INDEX DOCD: CPT | Performed by: HOSPITALIST

## 2020-10-09 PROCEDURE — 3078F DIAST BP <80 MM HG: CPT | Performed by: HOSPITALIST

## 2020-10-09 PROCEDURE — 0SRC0J9 REPLACEMENT OF RIGHT KNEE JOINT WITH SYNTHETIC SUBSTITUTE, CEMENTED, OPEN APPROACH: ICD-10-PCS | Performed by: ORTHOPAEDIC SURGERY

## 2020-10-09 PROCEDURE — 3074F SYST BP LT 130 MM HG: CPT | Performed by: HOSPITALIST

## 2020-10-09 DEVICE — RESURFACING PATELLA SIZE 3
Type: IMPLANTABLE DEVICE | Site: KNEE | Status: FUNCTIONAL
Brand: GMK PRIMARY TOTAL KNEE SYSTEM

## 2020-10-09 DEVICE — TIBIAL TRAY FIXED CEMENTED SIZE T3-I4 RIGHT
Type: IMPLANTABLE DEVICE | Site: KNEE | Status: FUNCTIONAL
Brand: GMK SPHERE TOTAL KNEE SYSTEM

## 2020-10-09 DEVICE — GMK SPHERE KNEE: Type: IMPLANTABLE DEVICE | Status: FUNCTIONAL

## 2020-10-09 DEVICE — REFOBACIN BC R 1X40 US: Type: IMPLANTABLE DEVICE | Site: KNEE | Status: FUNCTIONAL

## 2020-10-09 DEVICE — FEMUR SPHERE CEMENTED RIGHT, SIZE 4+
Type: IMPLANTABLE DEVICE | Site: KNEE | Status: FUNCTIONAL
Brand: GMK SPHERE TOTAL KNEE SYSTEM

## 2020-10-09 RX ORDER — DIPHENHYDRAMINE HYDROCHLORIDE 50 MG/ML
12.5 INJECTION INTRAMUSCULAR; INTRAVENOUS EVERY 4 HOURS PRN
Status: DISCONTINUED | OUTPATIENT
Start: 2020-10-09 | End: 2020-10-11

## 2020-10-09 RX ORDER — BUPIVACAINE HYDROCHLORIDE AND EPINEPHRINE 5; 5 MG/ML; UG/ML
INJECTION, SOLUTION PERINEURAL AS NEEDED
Status: DISCONTINUED | OUTPATIENT
Start: 2020-10-09 | End: 2020-10-09

## 2020-10-09 RX ORDER — MAGNESIUM HYDROXIDE 1200 MG/15ML
LIQUID ORAL CONTINUOUS PRN
Status: COMPLETED | OUTPATIENT
Start: 2020-10-09 | End: 2020-10-09

## 2020-10-09 RX ORDER — BUPIVACAINE HYDROCHLORIDE 7.5 MG/ML
INJECTION, SOLUTION INTRASPINAL AS NEEDED
Status: DISCONTINUED | OUTPATIENT
Start: 2020-10-09 | End: 2020-10-09 | Stop reason: SURG

## 2020-10-09 RX ORDER — HALOPERIDOL 5 MG/ML
0.5 INJECTION INTRAMUSCULAR ONCE AS NEEDED
Status: ACTIVE | OUTPATIENT
Start: 2020-10-09 | End: 2020-10-09

## 2020-10-09 RX ORDER — LIDOCAINE HYDROCHLORIDE 10 MG/ML
INJECTION, SOLUTION EPIDURAL; INFILTRATION; INTRACAUDAL; PERINEURAL AS NEEDED
Status: DISCONTINUED | OUTPATIENT
Start: 2020-10-09 | End: 2020-10-09 | Stop reason: SURG

## 2020-10-09 RX ORDER — HYDROCODONE BITARTRATE AND ACETAMINOPHEN 5; 325 MG/1; MG/1
2 TABLET ORAL EVERY 4 HOURS PRN
Status: DISCONTINUED | OUTPATIENT
Start: 2020-10-09 | End: 2020-10-11

## 2020-10-09 RX ORDER — ACETAMINOPHEN 325 MG/1
650 TABLET ORAL EVERY 4 HOURS PRN
Status: DISCONTINUED | OUTPATIENT
Start: 2020-10-09 | End: 2020-10-11

## 2020-10-09 RX ORDER — ONDANSETRON 2 MG/ML
4 INJECTION INTRAMUSCULAR; INTRAVENOUS EVERY 4 HOURS PRN
Status: DISCONTINUED | OUTPATIENT
Start: 2020-10-09 | End: 2020-10-11

## 2020-10-09 RX ORDER — VANCOMYCIN HYDROCHLORIDE
15 ONCE
Status: COMPLETED | OUTPATIENT
Start: 2020-10-09 | End: 2020-10-10

## 2020-10-09 RX ORDER — HYDROCODONE BITARTRATE AND ACETAMINOPHEN 7.5; 325 MG/1; MG/1
2 TABLET ORAL EVERY 6 HOURS PRN
Status: ACTIVE | OUTPATIENT
Start: 2020-10-09 | End: 2020-10-10

## 2020-10-09 RX ORDER — ACETAMINOPHEN 500 MG
1000 TABLET ORAL ONCE
Status: DISCONTINUED | OUTPATIENT
Start: 2020-10-09 | End: 2020-10-09

## 2020-10-09 RX ORDER — HYDROMORPHONE HYDROCHLORIDE 1 MG/ML
0.4 INJECTION, SOLUTION INTRAMUSCULAR; INTRAVENOUS; SUBCUTANEOUS
Status: ACTIVE | OUTPATIENT
Start: 2020-10-09 | End: 2020-10-10

## 2020-10-09 RX ORDER — CELECOXIB 200 MG/1
200 CAPSULE ORAL EVERY 12 HOURS SCHEDULED
Status: DISCONTINUED | OUTPATIENT
Start: 2020-10-09 | End: 2020-10-11

## 2020-10-09 RX ORDER — ONDANSETRON 2 MG/ML
INJECTION INTRAMUSCULAR; INTRAVENOUS AS NEEDED
Status: DISCONTINUED | OUTPATIENT
Start: 2020-10-09 | End: 2020-10-09 | Stop reason: SURG

## 2020-10-09 RX ORDER — FAMOTIDINE 20 MG/1
20 TABLET ORAL 2 TIMES DAILY
Status: DISCONTINUED | OUTPATIENT
Start: 2020-10-09 | End: 2020-10-11

## 2020-10-09 RX ORDER — HYDROCODONE BITARTRATE AND ACETAMINOPHEN 7.5; 325 MG/1; MG/1
1 TABLET ORAL EVERY 6 HOURS PRN
Status: DISPENSED | OUTPATIENT
Start: 2020-10-09 | End: 2020-10-10

## 2020-10-09 RX ORDER — ATORVASTATIN CALCIUM 20 MG/1
20 TABLET, FILM COATED ORAL EVERY EVENING
Status: DISCONTINUED | OUTPATIENT
Start: 2020-10-09 | End: 2020-10-11

## 2020-10-09 RX ORDER — PROCHLORPERAZINE EDISYLATE 5 MG/ML
5 INJECTION INTRAMUSCULAR; INTRAVENOUS ONCE AS NEEDED
Status: ACTIVE | OUTPATIENT
Start: 2020-10-09 | End: 2020-10-09

## 2020-10-09 RX ORDER — POLYETHYLENE GLYCOL 3350 17 G/17G
17 POWDER, FOR SOLUTION ORAL DAILY PRN
Status: DISCONTINUED | OUTPATIENT
Start: 2020-10-09 | End: 2020-10-11

## 2020-10-09 RX ORDER — MORPHINE SULFATE 4 MG/ML
4 INJECTION, SOLUTION INTRAMUSCULAR; INTRAVENOUS EVERY 2 HOUR PRN
Status: DISCONTINUED | OUTPATIENT
Start: 2020-10-09 | End: 2020-10-11

## 2020-10-09 RX ORDER — FAMOTIDINE 20 MG/1
20 TABLET ORAL ONCE
Status: DISCONTINUED | OUTPATIENT
Start: 2020-10-09 | End: 2020-10-09 | Stop reason: HOSPADM

## 2020-10-09 RX ORDER — PANTOPRAZOLE SODIUM 40 MG/1
40 TABLET, DELAYED RELEASE ORAL
Status: DISCONTINUED | OUTPATIENT
Start: 2020-10-10 | End: 2020-10-11

## 2020-10-09 RX ORDER — MORPHINE SULFATE 1 MG/ML
INJECTION, SOLUTION EPIDURAL; INTRATHECAL; INTRAVENOUS AS NEEDED
Status: DISCONTINUED | OUTPATIENT
Start: 2020-10-09 | End: 2020-10-09 | Stop reason: SURG

## 2020-10-09 RX ORDER — NALOXONE HYDROCHLORIDE 0.4 MG/ML
0.08 INJECTION, SOLUTION INTRAMUSCULAR; INTRAVENOUS; SUBCUTANEOUS
Status: ACTIVE | OUTPATIENT
Start: 2020-10-09 | End: 2020-10-10

## 2020-10-09 RX ORDER — ACETAMINOPHEN 325 MG/1
650 TABLET ORAL EVERY 6 HOURS PRN
Status: ACTIVE | OUTPATIENT
Start: 2020-10-09 | End: 2020-10-10

## 2020-10-09 RX ORDER — DIPHENHYDRAMINE HYDROCHLORIDE 50 MG/ML
12.5 INJECTION INTRAMUSCULAR; INTRAVENOUS EVERY 4 HOURS PRN
Status: ACTIVE | OUTPATIENT
Start: 2020-10-09 | End: 2020-10-10

## 2020-10-09 RX ORDER — MORPHINE SULFATE 2 MG/ML
1 INJECTION, SOLUTION INTRAMUSCULAR; INTRAVENOUS EVERY 2 HOUR PRN
Status: DISCONTINUED | OUTPATIENT
Start: 2020-10-09 | End: 2020-10-11

## 2020-10-09 RX ORDER — DOCUSATE SODIUM 100 MG/1
100 CAPSULE, LIQUID FILLED ORAL 2 TIMES DAILY
Status: DISCONTINUED | OUTPATIENT
Start: 2020-10-09 | End: 2020-10-11

## 2020-10-09 RX ORDER — ONDANSETRON 2 MG/ML
4 INJECTION INTRAMUSCULAR; INTRAVENOUS ONCE AS NEEDED
Status: ACTIVE | OUTPATIENT
Start: 2020-10-09 | End: 2020-10-09

## 2020-10-09 RX ORDER — HYDROCODONE BITARTRATE AND ACETAMINOPHEN 5; 325 MG/1; MG/1
1 TABLET ORAL EVERY 4 HOURS PRN
Status: DISCONTINUED | OUTPATIENT
Start: 2020-10-09 | End: 2020-10-11

## 2020-10-09 RX ORDER — GABAPENTIN 600 MG/1
600 TABLET ORAL ONCE
Status: COMPLETED | OUTPATIENT
Start: 2020-10-09 | End: 2020-10-09

## 2020-10-09 RX ORDER — METOCLOPRAMIDE 10 MG/1
10 TABLET ORAL ONCE
Status: DISCONTINUED | OUTPATIENT
Start: 2020-10-09 | End: 2020-10-09 | Stop reason: HOSPADM

## 2020-10-09 RX ORDER — MIDAZOLAM HYDROCHLORIDE 1 MG/ML
INJECTION INTRAMUSCULAR; INTRAVENOUS AS NEEDED
Status: DISCONTINUED | OUTPATIENT
Start: 2020-10-09 | End: 2020-10-09 | Stop reason: SURG

## 2020-10-09 RX ORDER — HYDROMORPHONE HYDROCHLORIDE 1 MG/ML
0.6 INJECTION, SOLUTION INTRAMUSCULAR; INTRAVENOUS; SUBCUTANEOUS
Status: ACTIVE | OUTPATIENT
Start: 2020-10-09 | End: 2020-10-10

## 2020-10-09 RX ORDER — BISACODYL 10 MG
10 SUPPOSITORY, RECTAL RECTAL
Status: DISCONTINUED | OUTPATIENT
Start: 2020-10-09 | End: 2020-10-11

## 2020-10-09 RX ORDER — DEXTROSE, SODIUM CHLORIDE, AND POTASSIUM CHLORIDE 5; .45; .15 G/100ML; G/100ML; G/100ML
INJECTION INTRAVENOUS CONTINUOUS
Status: DISCONTINUED | OUTPATIENT
Start: 2020-10-09 | End: 2020-10-11

## 2020-10-09 RX ORDER — SODIUM PHOSPHATE, DIBASIC AND SODIUM PHOSPHATE, MONOBASIC 7; 19 G/133ML; G/133ML
1 ENEMA RECTAL ONCE AS NEEDED
Status: DISCONTINUED | OUTPATIENT
Start: 2020-10-09 | End: 2020-10-11

## 2020-10-09 RX ORDER — DIPHENHYDRAMINE HYDROCHLORIDE 50 MG/ML
25 INJECTION INTRAMUSCULAR; INTRAVENOUS ONCE AS NEEDED
Status: ACTIVE | OUTPATIENT
Start: 2020-10-09 | End: 2020-10-09

## 2020-10-09 RX ORDER — MELATONIN
325
Status: DISCONTINUED | OUTPATIENT
Start: 2020-10-10 | End: 2020-10-11

## 2020-10-09 RX ORDER — FAMOTIDINE 10 MG/ML
20 INJECTION, SOLUTION INTRAVENOUS 2 TIMES DAILY
Status: DISCONTINUED | OUTPATIENT
Start: 2020-10-09 | End: 2020-10-11

## 2020-10-09 RX ORDER — ACETAMINOPHEN 500 MG
1000 TABLET ORAL ONCE
Status: COMPLETED | OUTPATIENT
Start: 2020-10-09 | End: 2020-10-09

## 2020-10-09 RX ORDER — DIPHENHYDRAMINE HCL 25 MG
25 CAPSULE ORAL EVERY 4 HOURS PRN
Status: ACTIVE | OUTPATIENT
Start: 2020-10-09 | End: 2020-10-10

## 2020-10-09 RX ORDER — DIPHENHYDRAMINE HCL 25 MG
25 CAPSULE ORAL EVERY 4 HOURS PRN
Status: DISCONTINUED | OUTPATIENT
Start: 2020-10-09 | End: 2020-10-11

## 2020-10-09 RX ORDER — ASPIRIN 325 MG
325 TABLET ORAL 2 TIMES DAILY
Status: DISCONTINUED | OUTPATIENT
Start: 2020-10-09 | End: 2020-10-11

## 2020-10-09 RX ORDER — CELECOXIB 200 MG/1
400 CAPSULE ORAL ONCE
Status: COMPLETED | OUTPATIENT
Start: 2020-10-09 | End: 2020-10-09

## 2020-10-09 RX ORDER — CEFAZOLIN SODIUM/WATER 2 G/20 ML
2 SYRINGE (ML) INTRAVENOUS ONCE
Status: COMPLETED | OUTPATIENT
Start: 2020-10-09 | End: 2020-10-09

## 2020-10-09 RX ORDER — SENNOSIDES 8.6 MG
17.2 TABLET ORAL NIGHTLY
Status: DISCONTINUED | OUTPATIENT
Start: 2020-10-09 | End: 2020-10-11

## 2020-10-09 RX ORDER — MORPHINE SULFATE 2 MG/ML
2 INJECTION, SOLUTION INTRAMUSCULAR; INTRAVENOUS EVERY 2 HOUR PRN
Status: DISCONTINUED | OUTPATIENT
Start: 2020-10-09 | End: 2020-10-11

## 2020-10-09 RX ORDER — DEXAMETHASONE SODIUM PHOSPHATE 4 MG/ML
VIAL (ML) INJECTION AS NEEDED
Status: DISCONTINUED | OUTPATIENT
Start: 2020-10-09 | End: 2020-10-09 | Stop reason: SURG

## 2020-10-09 RX ORDER — SODIUM CHLORIDE, SODIUM LACTATE, POTASSIUM CHLORIDE, CALCIUM CHLORIDE 600; 310; 30; 20 MG/100ML; MG/100ML; MG/100ML; MG/100ML
INJECTION, SOLUTION INTRAVENOUS CONTINUOUS
Status: DISCONTINUED | OUTPATIENT
Start: 2020-10-09 | End: 2020-10-11

## 2020-10-09 RX ORDER — PROCHLORPERAZINE EDISYLATE 5 MG/ML
10 INJECTION INTRAMUSCULAR; INTRAVENOUS EVERY 6 HOURS PRN
Status: DISCONTINUED | OUTPATIENT
Start: 2020-10-09 | End: 2020-10-11

## 2020-10-09 RX ADMIN — TRANEXAMIC ACID 1000 MG: 10 INJECTION, SOLUTION INTRAVENOUS at 11:39:00

## 2020-10-09 RX ADMIN — CEFAZOLIN SODIUM/WATER 2 G: 2 G/20 ML SYRINGE (ML) INTRAVENOUS at 11:36:00

## 2020-10-09 RX ADMIN — DEXAMETHASONE SODIUM PHOSPHATE 4 MG: 4 MG/ML VIAL (ML) INJECTION at 11:30:00

## 2020-10-09 RX ADMIN — ONDANSETRON 4 MG: 2 INJECTION INTRAMUSCULAR; INTRAVENOUS at 11:30:00

## 2020-10-09 RX ADMIN — LIDOCAINE HYDROCHLORIDE 30 MG: 10 INJECTION, SOLUTION EPIDURAL; INFILTRATION; INTRACAUDAL; PERINEURAL at 11:28:00

## 2020-10-09 RX ADMIN — SODIUM CHLORIDE, SODIUM LACTATE, POTASSIUM CHLORIDE, CALCIUM CHLORIDE: 600; 310; 30; 20 INJECTION, SOLUTION INTRAVENOUS at 11:21:00

## 2020-10-09 RX ADMIN — BUPIVACAINE HYDROCHLORIDE 1.4 ML: 7.5 INJECTION, SOLUTION INTRASPINAL at 11:28:00

## 2020-10-09 RX ADMIN — SODIUM CHLORIDE, SODIUM LACTATE, POTASSIUM CHLORIDE, CALCIUM CHLORIDE: 600; 310; 30; 20 INJECTION, SOLUTION INTRAVENOUS at 13:01:00

## 2020-10-09 RX ADMIN — MIDAZOLAM HYDROCHLORIDE 1 MG: 1 INJECTION INTRAMUSCULAR; INTRAVENOUS at 11:25:00

## 2020-10-09 RX ADMIN — MORPHINE SULFATE 0.3 MG: 1 INJECTION, SOLUTION EPIDURAL; INTRATHECAL; INTRAVENOUS at 11:28:00

## 2020-10-09 NOTE — PROGRESS NOTES
[facility]    Progress Note    Vandana Kendall Patient Status:  Inpatient    1945 MRN [id number]   Location [address] [facility] Attending Anabelle Zhu MD   Hosp Day # 0 PCP MD Mohsen Franks stage III (moderate)  MONITOR.              Results:     Lab Results   Component Value Date    WBC 3.6 (L) 09/30/2020    HGB 12.6 09/30/2020    HCT 38.1 09/30/2020    .0 09/30/2020    CREATSERUM 1.27 (H) 09/30/2020    BUN 15 09/30/2020     09/3

## 2020-10-09 NOTE — BRIEF OP NOTE
Pre-Operative Diagnosis: primary osteoarthritis of right knee     Post-Operative Diagnosis: primary osteoarthritis of right knee      Procedure Performed:   Procedure(s):  RIGHT TOTAL KNEE ARTHROPLASTY    Surgeon(s) and Role:     * Zita Cho,

## 2020-10-09 NOTE — INTERVAL H&P NOTE
Pre-op Diagnosis: primary osteoarthritis of right knee    The above referenced H&P was reviewed by Yessica Cifuentes MD on 10/9/2020, the patient was examined and no significant changes have occurred in the patient's condition since the H&P was perfor

## 2020-10-09 NOTE — PHYSICAL THERAPY NOTE
PHYSICAL THERAPY KNEE EVALUATION - INPATIENT       Room Number: 424/424-A  Evaluation Date: 10/9/2020  Type of Evaluation: Initial  Physician Order: PT Eval and Treat    Presenting Problem: R TKA  Reason for Therapy: Mobility Dysfunction and Discharge Plan training;Range of motion;Strengthening;Stoop training;Stair training;Transfer training;Balance training; Body mechanics; Energy conservation;Patient education  Rehab Potential : Good  Frequency (Obs): Daily       PHYSICAL THERAPY MEDICAL/SOCIAL HISTORY     H (Comment)(fiance)  Drives: Yes  Patient Owned Equipment: None  Patient Regularly Uses: None    Prior Level of [place]: IND    SUBJECTIVE  \"thank you so much for coming, I am eager to get up\"    PHYSICAL THERAPY EXAMINATION     OBJECTIVE  Precautions Provided:  Bed mobility  Body mechanics  Energy conservation  Gait training  Posture  ROM  Strengthening  Lower therapeutic exercise:   Ankle pumps  Heel slides  Knee extension  Quad sets  Transfer training    Patient End of Session: Up in chair;Needs met;C

## 2020-10-09 NOTE — ANESTHESIA PREPROCEDURE EVALUATION
Anesthesia PreOp Note    HPI:     Mirtha Salinas is a 67year old female who presents for preoperative consultation requested by: Julianne Hunt MD    Date of Surgery: 10/9/2020    Procedure(s):  KNEE TOTAL REPLACEMENT  Indication: primary Daun Needle epidermal inclusion cyst   • CATARACT Right 02/2019   • CATARACT Left 03/2019   • COLONOSCOPY  2014    [facility]   • ESOPHAGOGASTRODUODENOSCOPY (EGD) N/A 11/17/2016    Performed by Vangie Gallo MD at [address] [facility] Diabetes Sister    • Hypertension Sister    • Glaucoma Sister    • Breast Cancer Niece 48   • Macular degeneration Neg    • Ovarian Cancer Neg      Social History    Socioeconomic History      Marital status: Single      Spouse name: Not on file      Jessenia HCT 38.1 09/30/2020    MCV 92.3 09/30/2020    MCH 30.5 09/30/2020    MCHC 33.1 09/30/2020    RDW 12.4 09/30/2020    .0 09/30/2020     Lab Results   Component Value Date     09/30/2020    K 4.4 09/30/2020     09/30/2020    CO2 28.0 09/

## 2020-10-09 NOTE — PLAN OF CARE
Problem: MUSCULOSKELETAL - ADULT  Goal: Return mobility to safest level of function  Description: INTERVENTIONS:  - Assess patient stability and activity tolerance for standing, transferring and ambulating w/ or w/o assistive devices  - Assist with transfe Verbalizes/displays adequate comfort level or patient's stated pain goal  Description: INTERVENTIONS:  - Encourage pt to monitor pain and request assistance  - Assess pain using appropriate pain scale  - Administer analgesics based on type and severity of learning needs (meds, wound care, etc)  - Arrange for interpreters to assist at discharge as needed  - Consider post-discharge preferences of patient/family/discharge partner  - Complete POLST form as appropriate  - Assess patient's ability to be responsib

## 2020-10-09 NOTE — ANESTHESIA POSTPROCEDURE EVALUATION
Patient: Trine Bernheim    Procedure Summary     Date: 10/09/20 Room / Location: [facility] OR 09 / [facility] OR    Anesthesia Start: 9465 Anesthesia Stop: 5868    Procedure: KNEE TOTAL REPLACEMENT (Right Knee) Diagnosis: (primary osteoarthritis of right kne

## 2020-10-09 NOTE — ANESTHESIA PROCEDURE NOTES
Spinal Block  Performed by: Cornelio Lundborg, CRNA  Authorized by: Guera Finch MD       General Information and Staff    Start Time:  10/9/2020 11:25 AM  End Time:  10/9/2020 11:28 AM  Anesthesiologist:  Guera Finch MD  CRNA:  Cornelio Lundborg, CRNA  Per

## 2020-10-10 PROCEDURE — 99232 SBSQ HOSP IP/OBS MODERATE 35: CPT | Performed by: HOSPITALIST

## 2020-10-10 RX ORDER — ASPIRIN 325 MG
325 TABLET ORAL 2 TIMES DAILY
Qty: 54 TABLET | Refills: 0 | Status: SHIPPED | OUTPATIENT
Start: 2020-10-10 | End: 2020-11-06

## 2020-10-10 RX ORDER — POLYETHYLENE GLYCOL 3350 17 G/17G
17 POWDER, FOR SOLUTION ORAL DAILY PRN
Qty: 10 EACH | Refills: 0 | Status: SHIPPED | OUTPATIENT
Start: 2020-10-10 | End: 2021-05-14 | Stop reason: ALTCHOICE

## 2020-10-10 RX ORDER — HYDROCODONE BITARTRATE AND ACETAMINOPHEN 7.5; 325 MG/1; MG/1
2 TABLET ORAL EVERY 4 HOURS PRN
Qty: 40 TABLET | Refills: 0 | Status: ON HOLD | OUTPATIENT
Start: 2020-10-10 | End: 2021-03-09

## 2020-10-10 RX ORDER — CELECOXIB 200 MG/1
200 CAPSULE ORAL DAILY
Qty: 20 CAPSULE | Refills: 0 | Status: SHIPPED | OUTPATIENT
Start: 2020-10-10 | End: 2021-05-14 | Stop reason: ALTCHOICE

## 2020-10-10 NOTE — HOME CARE LIAISON
Spoke with patient, confirmed agreeable to [facility]. All questions addressed and answered.

## 2020-10-10 NOTE — PLAN OF CARE
Problem: Patient Centered Care  Goal: Patient preferences are identified and integrated in the patient's plan of care  Description: Interventions:  - What would you like us to know as we care for you?  Hoping to go home with home health  - Provide timely, wounds/incisions during mobilization  - Obtain PT/OT consults as needed  - Advance activity as appropriate  - Communicate ordered activity level and limitations with patient/family  10/10/2020 0925 by Maribeth Smith RN  Outcome: Progressing  10/10/202 Verbalizes/displays adequate comfort level or patient's stated pain goal  Description: INTERVENTIONS:  - Encourage pt to monitor pain and request assistance  - Assess pain using appropriate pain scale  - Administer analgesics based on type and severity of PLANNING  Goal: Discharge to home or other facility with appropriate resources  Description: INTERVENTIONS:  - Identify barriers to discharge w/pt and caregiver  - Include patient/family/discharge partner in discharge planning  - Arrange for needed dischar

## 2020-10-10 NOTE — PLAN OF CARE
Pt symptomatic hypotensive overnight. PRN bolus given x1. Pt is aox4, ambulating x1a with RW, but operative knee can buckle. Voiding per allen to be removed in AM. Aspirin BID and SCD's for DVT prophylaxis. Dressing Bulky ACE wrap CDI.  Norco 7.5mg 1-2tabs function  Description: INTERVENTIONS:  - Assess patient stability and activity tolerance for standing, transferring and ambulating w/ or w/o assistive devices  - Assist with transfers and ambulation using safe patient handling equipment as needed  - Ensure pain using appropriate pain scale  - Administer analgesics based on type and severity of pain and evaluate response  - Implement non-pharmacological measures as appropriate and evaluate response  - Consider cultural and social influences on pain and pain m partner  - Complete POLST form as appropriate  - Assess patient's ability to be responsible for managing their own health  - Refer to Case Management Department for coordinating discharge planning if the patient needs post-hospital services based on physic

## 2020-10-10 NOTE — PHYSICAL THERAPY NOTE
PHYSICAL THERAPY KNEE TREATMENT NOTE - INPATIENT     Room Number: 424/424-A             Presenting Problem: R TKA    Problem List  Principal Problem:    Primary osteoarthritis of right knee  Active Problems:    Hypercholesterolemia    Chronic kidney diseas on and standing up from a chair with arms (e.g., wheelchair, bedside commode, etc.): None   -   Moving from lying on back to sitting on the side of the bed?: A Little   How much help from another person does the patient currently need. ..   -   Moving to an SBA   Goal #4 Patient will negotiate 9 stairs/one curb w/ assistive device and supervision   Goal #4   Current Status 5 steps up/down with L HR CGA   Goal #5  AROM 0 degrees extension to 95 degrees flexion     Goal #5   Current Status 0-80   Goal #6 Georgina

## 2020-10-10 NOTE — OCCUPATIONAL THERAPY NOTE
OCCUPATIONAL THERAPY EVALUATION - INPATIENT      Room Number: 424/424-A  Evaluation Date: 10/10/2020  Type of Evaluation: Initial  Presenting Problem: s/p R TKR    Physician Order: IP Consult to Occupational Therapy  Reason for Therapy: ADL/IADL Dysfunctio osteoarthritis of right knee  Active Problems:    Hypercholesterolemia    Chronic kidney disease, stage III (moderate)      Past Medical History  Past Medical History:   Diagnosis Date   • Aortic atherosclerosis ([address]     CXR 1-18   • Cataract    • Chronic Restriction: None;R lower extremity        R Lower Extremity: Weight Bearing as Tolerated       PAIN ASSESSMENT  Ratin  Location: R knee  Management Techniques: Activity promotion; Body mechanics;Repositioning    RANGE OF MOTION   Upper extremity ROM is

## 2020-10-10 NOTE — ANESTHESIA POST-OP FOLLOW-UP NOTE
[facility]   Acute Pain Rounds Note  10/10/2020    Patient name: Suraj Dash 67year old female  : 1945  MRN: [id number]    Diagnosis: (Z01.818) Preop testing  (primary encounter diagnosis)  Plan: TYPE AND SCREEN, SARS-COV-2 BY

## 2020-10-10 NOTE — PROGRESS NOTES
[facility]    Progress Note    Trine Bernheim Patient Status:  Inpatient    1945 MRN [id number]   Location [facility] 4W/SW/SE Attending Ant Alvarado MD   Hosp Day # 1 PCP Felipe Martell MD       Subjective:   Antonia Khan acetaminophen, HYDROcodone-acetaminophen, HYDROcodone-acetaminophen, HYDROmorphone HCl, HYDROmorphone HCl, diphenhydrAMINE HCl **OR** diphenhydrAMINE, sodium chloride 0.9%, PEG 3350, magnesium hydroxide, bisacodyl, Fleet Enema, ondansetron HCl, Prochlorper

## 2020-10-10 NOTE — PROGRESS NOTES
Subjective: No complaints. Pain controlled. PT later this morning.     Objective:    Patient Vitals for the past 24 hrs:   BP Temp Temp src Pulse Resp SpO2   10/10/20 0830 95/58 97.8 °F (36.6 °C) Tympanic 63 18 93 %   10/10/20 0413 102/60 97.7 °F (36.5 °C)

## 2020-10-10 NOTE — CM/SW NOTE
Received MDO for post surgical.    SW spoke w/ pt via her room phone. She verified her address and confirmed living w/ her friend, Ciro Espino. They live in a home w/ 2 levels. There are approx 7 steps between levels and 1 step to enter the home.     Pt reports no

## 2020-10-11 VITALS
HEART RATE: 53 BPM | DIASTOLIC BLOOD PRESSURE: 57 MMHG | TEMPERATURE: 98 F | SYSTOLIC BLOOD PRESSURE: 121 MMHG | OXYGEN SATURATION: 92 % | BODY MASS INDEX: 29.66 KG/M2 | WEIGHT: 189 LBS | RESPIRATION RATE: 18 BRPM | HEIGHT: 67 IN

## 2020-10-11 PROCEDURE — 99232 SBSQ HOSP IP/OBS MODERATE 35: CPT | Performed by: HOSPITALIST

## 2020-10-11 RX ORDER — MORPHINE SULFATE 4 MG/ML
2 INJECTION, SOLUTION INTRAMUSCULAR; INTRAVENOUS ONCE
Status: DISCONTINUED | OUTPATIENT
Start: 2020-10-11 | End: 2020-10-11

## 2020-10-11 NOTE — PLAN OF CARE
Pt is aox4, ambulating with walker and one assist. Voiding freely to bathroom. Scds bilaterally with ASA BID for DVT prophylaxis.  Dressing to right knee aquacel, clean and dry, bulky ace wrap and fluff removed so pt could use CPM at [facility], was able to interventions  Outcome: Progressing  Goal: Patient/Family Short Term Goal  Description: Patient's Short Term Goal: stay ahead of the pain    Interventions:   - Administer medications as ordered, utilize non-pharm pain management strategies such as position bathing  - Educate and encourage patient/family in tolerated functional activity level and precautions during self-care  - Encourage patient to incorporate impaired side during daily activities to promote function  Outcome: Progressing     Problem: PAIN - appropriate resources  Description: INTERVENTIONS:  - Identify barriers to discharge w/pt and caregiver  - Include patient/family/discharge partner in discharge planning  - Arrange for needed discharge resources and transportation as appropriate  - Identif

## 2020-10-11 NOTE — PLAN OF CARE
Problem: Patient Centered Care  Goal: Patient preferences are identified and integrated in the patient's plan of care  Description: Interventions:  - What would you like us to know as we care for you?  Hoping to go home with home health  - Provide timely, provider's orders  - Instruct and reinforce with patient and family use of appropriate assistive device and precautions (e.g. spinal or hip dislocation precautions)  Outcome: Progressing     Problem: Impaired Functional Mobility  Goal: Achieve highest/safe fever/infection during anticipated neutropenic period  Description: INTERVENTIONS  - Monitor WBC  - Administer growth factors as ordered  - Implement neutropenic guidelines  Outcome: Progressing     Problem: SAFETY ADULT - FALL  Goal: Free from fall injury better and patient wants to discharge home today. BAILEE Sumner notified per night shift patient having severe pain over night but is subsiding. Encourage out of bed, encourage incentive spirometer, cough and deep breathe.  Plan for discharge home with home he

## 2020-10-11 NOTE — PLAN OF CARE
Problem: Patient Centered Care  Goal: Patient preferences are identified and integrated in the patient's plan of care  Description: Interventions:  - What would you like us to know as we care for you?  Hoping to go home with home health  - Provide timely, adequate protection for wounds/incisions during mobilization  - Obtain PT/OT consults as needed  - Advance activity as appropriate  - Communicate ordered activity level and limitations with patient/family  10/11/2020 1211 by Tye Peña RN  Outcome RN  Outcome: Progressing     Problem: PAIN - ADULT  Goal: Verbalizes/displays adequate comfort level or patient's stated pain goal  Description: INTERVENTIONS:  - Encourage pt to monitor pain and request assistance  - Assess pain using appropriate pain sca Discharge  10/11/2020 0749 by Donella Felty, RN  Outcome: Progressing     Problem: DISCHARGE PLANNING  Goal: Discharge to home or other facility with appropriate resources  Description: INTERVENTIONS:  - Identify barriers to discharge w/pt and caregiv

## 2020-10-11 NOTE — DISCHARGE SUMMARY
[facility] Hospitalist Discharge Summary   Patient ID:  Lukasz Maher  [id number]  67year old  2/27/1945    Admit date: 10/9/2020  Discharge date: 10/11/2020  Primary Care Physician: Frank Lynn MD   Attending Physician: No att. providers found   Consu 2 (two) times daily for 27 days.   What changed: when to take this        CONTINUE taking these medications    atorvastatin 20 MG Tabs  Commonly known as: LIPITOR  TAKE 1 TABLET BY MOUTH EVERY EVENING     Omeprazole 40 MG Cpdr     Vitamin D3 25 MCG (1000 UT with therapeutic plan as outlined    Pascual Martínez MD  Hospitalist  10/20/2020

## 2020-10-11 NOTE — PROGRESS NOTES
Subjective: No complaints. Pain controlled.     Objective:    Patient Vitals for the past 24 hrs:   BP Temp Temp src Pulse Resp SpO2   10/11/20 0451 119/65 98.1 °F (36.7 °C) Oral 60 18 94 %   10/10/20 2110 104/54 98.3 °F (36.8 °C) Oral 73 18 94 %   10/10/20

## 2020-10-12 NOTE — OPERATIVE REPORT
[facility]    PATIENT'S NAME: Jolanta Lara   ATTENDING PHYSICIAN: Naveen Martínez MD   OPERATING PHYSICIAN: Fozia Beckman MD   PATIENT ACCOUNT#:   [id number]    LOCATION:  [address] [place] [address] #:   [id number]       DATE OF LION the femur was used. The patellar cut was made first, and a patella protector was placed over the cut surface of the patella which was then subluxed into the lateral gutter.   The anterior and posterior cruciate ligaments were excised, and the femoral templ guide was then pinned into place and preparations were made for the stemmed portion of the tibial component. The trochlear recess was cut for the femur. The trial components were then removed, and the bone was prepared with pulsatile lavage.   All three c

## 2020-10-16 ENCOUNTER — TELEPHONE (OUTPATIENT)
Dept: ORTHOPEDICS CLINIC | Facility: CLINIC | Age: 75
End: 2020-10-16

## 2020-10-16 DIAGNOSIS — Z96.651 S/P TOTAL KNEE ARTHROPLASTY, RIGHT: Primary | ICD-10-CM

## 2020-10-21 ENCOUNTER — TELEPHONE (OUTPATIENT)
Dept: ORTHOPEDICS CLINIC | Facility: CLINIC | Age: 75
End: 2020-10-21

## 2020-10-21 NOTE — TELEPHONE ENCOUNTER
Per Kiana Madrid, wanting to let Dr. Shanice Senior know pt will be getting discharged from home health nursing today.  Please advise

## 2020-10-21 NOTE — TELEPHONE ENCOUNTER
Per Aasd Spaulding, pt will be discharged from home health tomorrow and starting outpatient therapy 10/27. Please advise.  No call back necessary

## 2020-10-27 ENCOUNTER — OFFICE VISIT (OUTPATIENT)
Dept: PHYSICAL THERAPY | Facility: HOSPITAL | Age: 75
End: 2020-10-27
Attending: PHYSICIAN ASSISTANT
Payer: MEDICARE

## 2020-10-27 DIAGNOSIS — Z96.651 S/P TOTAL KNEE ARTHROPLASTY, RIGHT: ICD-10-CM

## 2020-10-27 PROCEDURE — 97110 THERAPEUTIC EXERCISES: CPT

## 2020-10-27 PROCEDURE — 97140 MANUAL THERAPY 1/> REGIONS: CPT

## 2020-10-27 PROCEDURE — 97161 PT EVAL LOW COMPLEX 20 MIN: CPT

## 2020-10-27 NOTE — PROGRESS NOTES
POST-OP KNEE EVALUATION:   Referring Physician: Dr. Guru Sears  Diagnosis: S/P total knee arthroplasty, right (C22.431)     Date of Service: 10/27/2020     PATIENT Capri Burch is a 67year old female who presents to therapy today s/p R TKR on 1 ([address]     CXR 1-18   • Cataract    • Chronic kidney disease, stage III (moderate)    • Deep vein thrombosis ([address] 2005    RLE, treated with Coumadin for 6 months   • GERD (gastroesophageal reflux disease)     EGD 11-16 with hiatal hernia and gastritis with R 4-/5; L 4-/5 Flexion: R 4-/5; L 4+/5  Extension: R 4-/5; L 4+/5          Functional Mobility:  Sit<>stand: independent with mod use of UE's   Bed mobility: independent      Gait: pt ambulates on level ground with assistive device of cesar GOODRICH decreas include: Gait training, Manual Therapy, Neuromuscular Re-education, Self-Care Home Management, Therapeutic Activities, Therapeutic Exercise, Home Exercise Program instruction and Modalities to include: Electrical stimulation (unattended) and Heat/ice    Ed

## 2020-10-29 ENCOUNTER — OFFICE VISIT (OUTPATIENT)
Dept: PHYSICAL THERAPY | Facility: HOSPITAL | Age: 75
End: 2020-10-29
Attending: PHYSICIAN ASSISTANT
Payer: MEDICARE

## 2020-10-29 DIAGNOSIS — Z96.651 S/P TOTAL KNEE ARTHROPLASTY, RIGHT: ICD-10-CM

## 2020-10-29 PROCEDURE — 97110 THERAPEUTIC EXERCISES: CPT

## 2020-10-29 PROCEDURE — 97140 MANUAL THERAPY 1/> REGIONS: CPT

## 2020-10-29 NOTE — PROGRESS NOTES
Dx:    S/P total knee arthroplasty, right (Z96.651)         Insurance (Authorized # of Visits): 10 visits per plan     Isis Payan MA [facility])  Authorizing Physician: Dr. Kelton Lawler MD visit: 10/30/20  Fall Risk: standard           Precautions: Fall Risk, h/o board  2x10  -Long sitting QS 5 sec hold x10  -Long sitting AP x15       Manual Therapy:  Patella mobilization,  PROM right knee  STM: right hamstring            HEP: Review HEP, No new exercises this session    Charges: Ex x2, MM x1        Total Timed [place]

## 2020-10-30 ENCOUNTER — OFFICE VISIT (OUTPATIENT)
Dept: ORTHOPEDICS CLINIC | Facility: CLINIC | Age: 75
End: 2020-10-30
Payer: MEDICARE

## 2020-10-30 ENCOUNTER — HOSPITAL ENCOUNTER (OUTPATIENT)
Dept: GENERAL RADIOLOGY | Facility: HOSPITAL | Age: 75
Discharge: HOME | End: 2020-10-30
Attending: ORTHOPAEDIC SURGERY
Payer: MEDICARE

## 2020-10-30 VITALS — BODY MASS INDEX: 29.66 KG/M2 | HEIGHT: 67 IN | WEIGHT: 189 LBS

## 2020-10-30 DIAGNOSIS — Z47.89 ORTHOPEDIC AFTERCARE: Primary | ICD-10-CM

## 2020-10-30 DIAGNOSIS — Z47.89 ORTHOPEDIC AFTERCARE: ICD-10-CM

## 2020-10-30 PROCEDURE — 3008F BODY MASS INDEX DOCD: CPT | Performed by: ORTHOPAEDIC SURGERY

## 2020-10-30 PROCEDURE — 1111F DSCHRG MED/CURRENT MED MERGE: CPT | Performed by: ORTHOPAEDIC SURGERY

## 2020-10-30 PROCEDURE — 73562 X-RAY EXAM OF KNEE 3: CPT | Performed by: ORTHOPAEDIC SURGERY

## 2020-10-30 PROCEDURE — 99024 POSTOP FOLLOW-UP VISIT: CPT | Performed by: ORTHOPAEDIC SURGERY

## 2020-10-30 NOTE — PROGRESS NOTES
NURSING INTAKE COMMENTS: Patient presents with:  Post-Op: right knee arthroplasty, pain at a 5/10 today      HPI: This 67year old female presents today for follow-up after right total knee arthroplasty. Her surgery was 3 weeks ago.   Her pain is well cont days. 54 tablet 0   • celecoxib 200 MG Oral Cap Take 1 capsule (200 mg total) by mouth daily. 20 capsule 0   • ATORVASTATIN 20 MG Oral Tab TAKE 1 TABLET BY MOUTH EVERY EVENING 90 tablet 1   • Vitamin D3 1000 units Oral Tab Take 1,000 Units by mouth daily. chest pain  GI: no hematemesis, no worsening heartburn, no diarrhea  : no dysuria, no blood in urine, no difficulty urinating, no incontinence  MUSCULOSKELETAL: no other musculoskeletal complaints other than in HPI  NEURO: no numbness or tingling, no wea

## 2020-11-03 ENCOUNTER — OFFICE VISIT (OUTPATIENT)
Dept: PHYSICAL THERAPY | Facility: HOSPITAL | Age: 75
End: 2020-11-03
Attending: PHYSICIAN ASSISTANT
Payer: MEDICARE

## 2020-11-03 DIAGNOSIS — Z96.651 S/P TOTAL KNEE ARTHROPLASTY, RIGHT: ICD-10-CM

## 2020-11-03 PROCEDURE — 97110 THERAPEUTIC EXERCISES: CPT

## 2020-11-03 PROCEDURE — 97140 MANUAL THERAPY 1/> REGIONS: CPT

## 2020-11-04 NOTE — PROGRESS NOTES
Dx:    S/P total knee arthroplasty, right (Z96.651)         Insurance (Authorized # of Visits): 10 visits per plan     Aravind Manrique MA [facility] [place])  Authorizing Physician: Dr. Ekta Lawler MD visit: 10/30/20  Fall Risk: standard           Precautions: Fall Risk, h/o sit x6 min  -Seated LAQ 2x10  -Seated knee flexion 2x10  -Supine QS towel roll under knee 5 sec hold x15  -SLR with assit 2x10  -Heel slide on sliding board  2x10  -Long sitting QS 5 sec hold x10  -Long sitting AP x15   EX;  -Nu Step L4 BUE's #10, #12 sit

## 2020-11-05 ENCOUNTER — OFFICE VISIT (OUTPATIENT)
Dept: PHYSICAL THERAPY | Facility: HOSPITAL | Age: 75
End: 2020-11-05
Attending: PHYSICIAN ASSISTANT
Payer: MEDICARE

## 2020-11-05 DIAGNOSIS — Z96.651 S/P TOTAL KNEE ARTHROPLASTY, RIGHT: ICD-10-CM

## 2020-11-05 PROCEDURE — 97140 MANUAL THERAPY 1/> REGIONS: CPT

## 2020-11-05 PROCEDURE — 97110 THERAPEUTIC EXERCISES: CPT

## 2020-11-05 NOTE — PROGRESS NOTES
Dx:    S/P total knee arthroplasty, right (Z96.651)         Insurance (Authorized # of Visits): 10 visits per plan     Michelle Del Castillo [place] [facility]  Authorizing Physician: Dr. Paola Lawler MD visit: 10/30/20  Fall Risk: standard           Precautions: Fall Risk, h/o Visit #3/10  Date: 11/03/20 Visit #4/10  Date: 11/05/20    EX;  -Nu Step L4 BUE's #10, #12 sit x6 min  -Seated LAQ 2x10  -Seated knee flexion 2x10  -Supine QS towel roll under knee 5 sec hold x15  -SLR with assit 2x10  -Heel slide on sliding board  2x10  -

## 2020-11-10 ENCOUNTER — OFFICE VISIT (OUTPATIENT)
Dept: PHYSICAL THERAPY | Facility: HOSPITAL | Age: 75
End: 2020-11-10
Attending: PHYSICIAN ASSISTANT
Payer: MEDICARE

## 2020-11-10 DIAGNOSIS — Z96.651 S/P TOTAL KNEE ARTHROPLASTY, RIGHT: ICD-10-CM

## 2020-11-10 PROCEDURE — 97110 THERAPEUTIC EXERCISES: CPT

## 2020-11-10 NOTE — PROGRESS NOTES
Dx:    S/P total knee arthroplasty, right (Z96.651)         Insurance (Authorized # of Visits): 10 visits per plan     Johnna Harkins MA [facility] [place])  Authorizing Physician: Dr. Mundo HODGE visit: 10/30/20  Fall Risk: standard           Precautions: Fall Risk, h/o stretching, strengthening and will try next session TENS to activate quad. Discussed with patient POC with the PT.      Visit #2/10  Date: 10/29/20 Visit #3/10  Date: 11/03/20 Visit #4/10  Date: 11/05/20 Visit #5/10  Date: 11/10/20   EX;  -Nu Step L4 BUE's Manual Therapy:  Patella mobilization,  PROM right knee       HEP: Review HEP, No new exercises this session  11/10/20: Prone knee flexion, prone knee hang, heel raises,     Charges: Ex x3      Total Timed Treatment: 45 min  Total Treatment Time: 47 min

## 2020-11-13 ENCOUNTER — OFFICE VISIT (OUTPATIENT)
Dept: PHYSICAL THERAPY | Facility: HOSPITAL | Age: 75
End: 2020-11-13
Attending: PHYSICIAN ASSISTANT
Payer: MEDICARE

## 2020-11-13 PROCEDURE — 97140 MANUAL THERAPY 1/> REGIONS: CPT

## 2020-11-13 PROCEDURE — 97110 THERAPEUTIC EXERCISES: CPT

## 2020-11-13 NOTE — PROGRESS NOTES
Dx: S/P total knee arthroplasty, right (Z96.651)         Insurance (Authorized # of Visits): 10 visits per plan     Mayank Hill MA [facility])  Authorizing Physician: Dr. Davonte Lawler MD visit: 10/30/20  Fall Risk: standard           Precautions: Fall Risk, h/o DVT to be able to get up and down from the floor safely - progressing  · Pt will demonstrate increased hip ER/ABD strength to 4+/5 to perform stepping and squatting activities without excessive femoral IR/ADD - progressing  · Pt will improve SLS to >20s to imp 5x10:  Quad sets long-sitting - 10x10\"  LAQ's - 10x5\"  Tracing on TM (L stance leg on side of TM and performing stepping pattern with the R) - 3 mins   Knee ext stretch - discussed for HEP    Manual Therapy:  Patella mobilization,  PROM right knee  STM:

## 2020-11-17 ENCOUNTER — OFFICE VISIT (OUTPATIENT)
Dept: PHYSICAL THERAPY | Facility: HOSPITAL | Age: 75
End: 2020-11-17
Attending: PHYSICIAN ASSISTANT
Payer: MEDICARE

## 2020-11-17 PROCEDURE — 97140 MANUAL THERAPY 1/> REGIONS: CPT

## 2020-11-17 PROCEDURE — 97110 THERAPEUTIC EXERCISES: CPT

## 2020-11-17 NOTE — PROGRESS NOTES
Dx: S/P total knee arthroplasty, right (Z96.651)         Insurance (Authorized # of Visits): 10 visits per plan     Richy Rothman MA [facility])  Authorizing Physician: Dr. Celestina Lawler MD visit: 10/30/20  Fall Risk: standard           Precautions: Fall Risk, h/o DVT in PT - met for current program       Plan: Continued right knee ROM, stretching, strengthening, improved quad activation and mobilization of knee.      Visit #3/10  Date: 11/03/20 Visit #4/10  Date: 11/05/20 Visit #5/10  Date: 11/10/20 Visit: 6/10  11/13/2 5 x 30seconds  Prone TKE with VC's and Tactile cueing x 10 R/L for carry over effect  Seated LAQ w/ towel roll under knee 10 x 10seconds  SLR with quad set partial lift and hold x 15.   Demonstrated Level I,II,III SLR  Heel slides x 20     Manual Therapy:

## 2020-11-19 ENCOUNTER — OFFICE VISIT (OUTPATIENT)
Dept: PHYSICAL THERAPY | Facility: HOSPITAL | Age: 75
End: 2020-11-19
Attending: PHYSICIAN ASSISTANT
Payer: MEDICARE

## 2020-11-19 PROCEDURE — 97140 MANUAL THERAPY 1/> REGIONS: CPT

## 2020-11-19 PROCEDURE — 97110 THERAPEUTIC EXERCISES: CPT

## 2020-11-19 NOTE — PROGRESS NOTES
Dx: S/P total knee arthroplasty, right (Z96.651)         Insurance (Authorized # of Visits): 10 visits per plan     Keron Crump MA [facility])  Authorizing Physician: Dr. Lollie Closs Next MD visit: 10/30/20  Fall Risk: standard           Precautions: Fall Risk, h/o DVT strengthening, improved quad activation and mobilization of knee.      Visit #3/10  Date: 11/03/20 Visit #4/10  Date: 11/05/20 Visit #5/10  Date: 11/10/20 Visit: 6/10  11/13/2020 Visit: 7/10  11/17/2020 Visit: 8/10  11/19/2020   EX;  -Nu Step L4 BUE's #10, cueing x 10 R/L for carry over effect  Seated LAQ w/ towel roll under knee 10 x 10seconds  SLR with quad set partial lift and hold x 15.   Demonstrated Level I,II,III SLR  Heel slides x 20   There Ex  NuStep 5 minutes L5 LE only  Quad sets in long sit 2 x 1

## 2020-11-20 ENCOUNTER — APPOINTMENT (OUTPATIENT)
Dept: PHYSICAL THERAPY | Facility: HOSPITAL | Age: 75
End: 2020-11-20
Attending: PHYSICIAN ASSISTANT
Payer: MEDICARE

## 2020-11-24 ENCOUNTER — APPOINTMENT (OUTPATIENT)
Dept: PHYSICAL THERAPY | Facility: HOSPITAL | Age: 75
End: 2020-11-24
Attending: PHYSICIAN ASSISTANT
Payer: MEDICARE

## 2020-11-24 PROCEDURE — 97110 THERAPEUTIC EXERCISES: CPT

## 2020-11-24 PROCEDURE — 97140 MANUAL THERAPY 1/> REGIONS: CPT

## 2020-11-24 NOTE — PROGRESS NOTES
ProgressSummary  Pt has attended 9 visits in Physical Therapy.      Dx: S/P total knee arthroplasty, right (Z96.651)         Insurance (Authorized # of Visits): 10 visits per plan     Johnna Harkins [place] [facility])  Authorizing Physician: Dr. Mundo HODGE visit: 8 femoral IR/ADD - progressing  · Pt will improve SLS to >20s to improve safety and independence with gait on uneven surfaces such as grass - progressing  · Pt will be independent and compliant with comprehensive HEP to maintain progress achieved in PT - met Level I,II,III SLR  Heel slides x 20   There Ex  NuStep 5 minutes L5 LE only  Quad sets in long sit 2 x 10, 5 second holds visualizing ms contraction  SAQ over small bolster x 15, 5sec holds  Knee extension stretch in supine w/clinician OP 5 x 30 seconds

## 2020-12-01 ENCOUNTER — APPOINTMENT (OUTPATIENT)
Dept: PHYSICAL THERAPY | Facility: HOSPITAL | Age: 75
End: 2020-12-01
Attending: PHYSICIAN ASSISTANT
Payer: MEDICARE

## 2020-12-02 ENCOUNTER — OFFICE VISIT (OUTPATIENT)
Dept: ORTHOPEDICS CLINIC | Facility: CLINIC | Age: 75
End: 2020-12-02
Payer: MEDICARE

## 2020-12-02 ENCOUNTER — OFFICE VISIT (OUTPATIENT)
Dept: PHYSICAL THERAPY | Facility: HOSPITAL | Age: 75
End: 2020-12-02
Attending: PHYSICIAN ASSISTANT
Payer: MEDICARE

## 2020-12-02 ENCOUNTER — HOSPITAL ENCOUNTER (OUTPATIENT)
Dept: GENERAL RADIOLOGY | Facility: HOSPITAL | Age: 75
Discharge: HOME | End: 2020-12-02
Attending: ORTHOPAEDIC SURGERY
Payer: MEDICARE

## 2020-12-02 DIAGNOSIS — Z47.89 ORTHOPEDIC AFTERCARE: Primary | ICD-10-CM

## 2020-12-02 DIAGNOSIS — Z47.89 ORTHOPEDIC AFTERCARE: ICD-10-CM

## 2020-12-02 PROCEDURE — 99024 POSTOP FOLLOW-UP VISIT: CPT | Performed by: ORTHOPAEDIC SURGERY

## 2020-12-02 PROCEDURE — 73562 X-RAY EXAM OF KNEE 3: CPT | Performed by: ORTHOPAEDIC SURGERY

## 2020-12-02 PROCEDURE — 97110 THERAPEUTIC EXERCISES: CPT

## 2020-12-02 PROCEDURE — 97140 MANUAL THERAPY 1/> REGIONS: CPT

## 2020-12-02 RX ORDER — CELECOXIB 200 MG/1
200 CAPSULE ORAL 2 TIMES DAILY
Qty: 60 CAPSULE | Refills: 0 | Status: SHIPPED | OUTPATIENT
Start: 2020-12-02 | End: 2020-12-03

## 2020-12-02 NOTE — PROGRESS NOTES
NURSING INTAKE COMMENTS: Patient presents with:  Post-Op: s/p Right TKA f/u - had sx on 10/9/2020 - states she is better - still has pain rated as 7/10 on and off - no numbness or tingling - she feels like shooting pains       HPI: This 67year old female VARICOSE VEINS, 1 EXTREMITY; <20 INCISIONS Right    • TOTAL KNEE REPLACEMENT Right 10/2020     Current Outpatient Medications   Medication Sig Dispense Refill   • celecoxib 200 MG Oral Cap Take 1 capsule (200 mg total) by mouth 2 (two) times daily.  60 caps significant weight loss or weight gain  SKIN: no ulcerated or worrisome skin lesions  EYES:denies blurred vision or double vision  HEENT: denies new nasal congestion, sinus pain or ST  LUNGS: denies shortness of breath  CARDIOVASCULAR: denies chest pain  G progressing well following her surgery. We discussed the expected recovery time following knee replacement surgery. I believe the symptoms that she is having now will slowly improve over time. She is to do some scar massage with vitamin E oil.   I refill

## 2020-12-02 NOTE — PROGRESS NOTES
Dx: S/P total knee arthroplasty, right (Z96.651)         Insurance (Authorized # of Visits): 10 visits per plan     [facility])  Authorizing Physician: Dr. Nevaeh Lawler MD visit: 10/30/20  Fall Risk: standard           Precautions: Fall Risk, h/o DVT program       Plan: Continue skilled Physical Therapy 1-2 x/week or a total of 10 visits over a 90 day period. Treatment will include: manual, TherEx, therAct, neuro Ranulfo, self care and modalities as needed.         Certification From: 87/13/7253  To:2/22/2 joint - grd II-III  Knee ext with stretch into ext of tibiofemoral joint - grd II-III   Tibiofemoral distraction seated - grd II-III   STM/DTM to hamstrings  Manual:  Post glide tibiofemoral joint - grd II-III  Knee ext with stretch into ext of tibiofemora

## 2020-12-03 ENCOUNTER — APPOINTMENT (OUTPATIENT)
Dept: PHYSICAL THERAPY | Facility: HOSPITAL | Age: 75
End: 2020-12-03
Attending: PHYSICIAN ASSISTANT
Payer: MEDICARE

## 2020-12-03 RX ORDER — CELECOXIB 200 MG/1
CAPSULE ORAL
Qty: 180 CAPSULE | Refills: 0 | Status: ON HOLD | OUTPATIENT
Start: 2020-12-03 | End: 2021-03-09

## 2020-12-03 NOTE — TELEPHONE ENCOUNTER
Dr Cho/Samson: Please advise on refill request    Script written: 12/2/20    LOV:12/2/20    *pt is requesting 90 day supply of medication*

## 2020-12-04 ENCOUNTER — OFFICE VISIT (OUTPATIENT)
Dept: PHYSICAL THERAPY | Facility: HOSPITAL | Age: 75
End: 2020-12-04
Attending: PHYSICIAN ASSISTANT
Payer: MEDICARE

## 2020-12-04 PROCEDURE — 97140 MANUAL THERAPY 1/> REGIONS: CPT

## 2020-12-04 PROCEDURE — 97110 THERAPEUTIC EXERCISES: CPT

## 2020-12-04 NOTE — PROGRESS NOTES
Dx: S/P total knee arthroplasty, right (Z96.651)         Insurance (Authorized # of Visits): 10 visits per plan     Johann Bliss [place] [facility]  Authorizing Physician: Dr. Soham Lawler MD visit: 10/30/20  Fall Risk: standard           Precautions: Fall Risk, h/o DVT x/week or a total of 10 visits over a 90 day period. Treatment will include: manual, TherEx, therAct, neuro Ranulfo, self care and modalities as needed.         Certification From: 18/03/5401  To:2/22/2021     Visit: 8/10  11/19/2020 11/24/2020  Tx: 9/10 12/2/ tibiofemoral joint - grd II-III  Knee ext with stretch into ext of tibiofemoral joint - grd II-III   Tibiofemoral distraction seated - grd II-III   STM/DTM to distal and lateral quad - also used foam roller      HEP: quad sets, laq's, knee ext stretch, cristopher

## 2020-12-08 ENCOUNTER — APPOINTMENT (OUTPATIENT)
Dept: PHYSICAL THERAPY | Facility: HOSPITAL | Age: 75
End: 2020-12-08
Attending: PHYSICIAN ASSISTANT
Payer: MEDICARE

## 2020-12-09 ENCOUNTER — OFFICE VISIT (OUTPATIENT)
Dept: PHYSICAL THERAPY | Facility: HOSPITAL | Age: 75
End: 2020-12-09
Attending: PHYSICIAN ASSISTANT
Payer: MEDICARE

## 2020-12-09 PROCEDURE — 97140 MANUAL THERAPY 1/> REGIONS: CPT

## 2020-12-09 PROCEDURE — 97110 THERAPEUTIC EXERCISES: CPT

## 2020-12-09 NOTE — PROGRESS NOTES
Dx: S/P total knee arthroplasty, right (Z96.651)         Insurance (Authorized # of Visits): 10 visits per plan     Danyelle Schuster [facility])  Authorizing Physician: Dr. Lizbeth Lawler MD visit: 10/30/20  Fall Risk: standard           Precautions: Fall Risk, h/o DVT independent and compliant with comprehensive HEP to maintain progress achieved in PT - met for current program       Plan: Continue skilled Physical Therapy 1-2 x/week or a total of 10 visits over a 90 day period.  Treatment will include: manual, TherEx, th seated - grd II-III   STM/DTM to distal and lateral quad - also used foam roller  Manual:  Post glide tibiofemoral joint - grd II-III  STM/DTM to distal and lateral quad  Foam roller to lateral and distal quad - instructed pt in using rolling pin and heati

## 2020-12-10 ENCOUNTER — APPOINTMENT (OUTPATIENT)
Dept: PHYSICAL THERAPY | Facility: HOSPITAL | Age: 75
End: 2020-12-10
Attending: PHYSICIAN ASSISTANT
Payer: MEDICARE

## 2020-12-11 ENCOUNTER — OFFICE VISIT (OUTPATIENT)
Dept: PHYSICAL THERAPY | Facility: HOSPITAL | Age: 75
End: 2020-12-11
Attending: PHYSICIAN ASSISTANT
Payer: MEDICARE

## 2020-12-11 PROCEDURE — 97140 MANUAL THERAPY 1/> REGIONS: CPT

## 2020-12-11 PROCEDURE — 97110 THERAPEUTIC EXERCISES: CPT

## 2020-12-11 NOTE — PROGRESS NOTES
Dx: S/P total knee arthroplasty, right (Z96.651)         Insurance (Authorized # of Visits): 10 visits per plan     Maged Parsons [place] [facility]  Authorizing Physician: Dr. Ronen Lawler MD visit: 10/30/20  Fall Risk: standard           Precautions: Fall Risk, h/o DVT progressing  · Pt will improve SLS to >20s to improve safety and independence with gait on uneven surfaces such as grass - progressing  · Pt will be independent and compliant with comprehensive HEP to maintain progress achieved in PT - met for current prog seated - grd II-III   STM/DTM to distal and lateral quad - also used foam roller  Manual:  Post glide tibiofemoral joint - grd II-III  STM/DTM to distal and lateral quad  Foam roller to lateral and distal quad - instructed pt in using rolling pin and heati

## 2020-12-14 ENCOUNTER — OFFICE VISIT (OUTPATIENT)
Dept: ORTHOPEDICS CLINIC | Facility: CLINIC | Age: 75
End: 2020-12-14
Payer: MEDICARE

## 2020-12-14 ENCOUNTER — HOSPITAL ENCOUNTER (OUTPATIENT)
Dept: GENERAL RADIOLOGY | Facility: HOSPITAL | Age: 75
Discharge: HOME | End: 2020-12-14
Attending: ORTHOPAEDIC SURGERY
Payer: MEDICARE

## 2020-12-14 VITALS — HEIGHT: 67 IN | WEIGHT: 189 LBS | BODY MASS INDEX: 29.66 KG/M2

## 2020-12-14 DIAGNOSIS — Z47.89 ORTHOPEDIC AFTERCARE: ICD-10-CM

## 2020-12-14 DIAGNOSIS — M25.561 RIGHT KNEE PAIN, UNSPECIFIED CHRONICITY: ICD-10-CM

## 2020-12-14 DIAGNOSIS — M25.561 RIGHT KNEE PAIN, UNSPECIFIED CHRONICITY: Primary | ICD-10-CM

## 2020-12-14 PROCEDURE — 3008F BODY MASS INDEX DOCD: CPT | Performed by: ORTHOPAEDIC SURGERY

## 2020-12-14 PROCEDURE — 73562 X-RAY EXAM OF KNEE 3: CPT | Performed by: ORTHOPAEDIC SURGERY

## 2020-12-14 PROCEDURE — 99024 POSTOP FOLLOW-UP VISIT: CPT | Performed by: ORTHOPAEDIC SURGERY

## 2020-12-14 NOTE — PROGRESS NOTES
NURSING INTAKE COMMENTS: Patient presents with:  Knee Pain: pt presents today for right knee pain. pt has 6/10 pain. HPI: This 67year old female presents today for follow-up of her right knee replacement.   She reports that she was doing great until Cap TAKE 1 CAPSULE(200 MG) BY MOUTH TWICE DAILY 180 capsule 0   • celecoxib 200 MG Oral Cap Take 1 capsule (200 mg total) by mouth daily. 20 capsule 0   • PEG 3350 17 g Oral Powd Pack Take 17 g by mouth daily as needed.  10 each 0   • ATORVASTATIN 20 MG Ora shortness of breath  CARDIOVASCULAR: denies chest pain  GI: no hematemesis, no worsening heartburn, no diarrhea  : no dysuria, no blood in urine, no difficulty urinating, no incontinence  MUSCULOSKELETAL: no other musculoskeletal complaints other than in Payton Serrano MD on 12/02/2020 at 9:06 AM             Lab Results   Component Value Date    WBC 7.8 10/10/2020    HGB 10.4 (L) 10/10/2020    .0 10/10/2020      Lab Results   Component Value Date    GLU 93 09/30/2020    BUN 15 09/30/2020    CREA

## 2020-12-16 ENCOUNTER — OFFICE VISIT (OUTPATIENT)
Dept: PHYSICAL THERAPY | Facility: HOSPITAL | Age: 75
End: 2020-12-16
Attending: PHYSICIAN ASSISTANT
Payer: MEDICARE

## 2020-12-16 PROCEDURE — 97140 MANUAL THERAPY 1/> REGIONS: CPT

## 2020-12-16 PROCEDURE — 97110 THERAPEUTIC EXERCISES: CPT

## 2020-12-16 NOTE — PROGRESS NOTES
Dx: S/P total knee arthroplasty, right (Z96.651)         Insurance (Authorized # of Visits): 10 visits per plan     Mesfin Bernard MA [facility])  Authorizing Physician: Dr. Lisa Ozuna    Fall Risk: standard           Precautions: Fall Risk, h/o DVT     Subjective:  Pt stat to >20s to improve safety and independence with gait on uneven surfaces such as grass - progressing  · Pt will be independent and compliant with comprehensive HEP to maintain progress achieved in PT - met for current program       Plan: Continue skilled Ph distal and lateral quad  Foam roller to lateral and distal quad - instructed pt in using rolling pin and heating pad at home on muscles  Manual:  Post glide tibiofemoral joint - grd II-III  STM/DTM to distal and lateral quad  DTM to HS in supine and prone

## 2020-12-18 ENCOUNTER — OFFICE VISIT (OUTPATIENT)
Dept: PHYSICAL THERAPY | Facility: HOSPITAL | Age: 75
End: 2020-12-18
Attending: PHYSICIAN ASSISTANT
Payer: MEDICARE

## 2020-12-18 PROCEDURE — 97140 MANUAL THERAPY 1/> REGIONS: CPT

## 2020-12-18 PROCEDURE — 97110 THERAPEUTIC EXERCISES: CPT

## 2020-12-18 NOTE — PROGRESS NOTES
Dx: S/P total knee arthroplasty, right (Z96.651)         Insurance (Authorized # of Visits): 10 visits per plan     Berry Guerrero MA [facility])  Authorizing Physician: Dr. Diana Collins    Fall Risk: standard           Precautions: Fall Risk, h/o DVT     Subjective:  Pt stat progress achieved in PT - met for current program       Plan: Continue skilled Physical Therapy 1-2 x/week or a total of 10 visits over a 90 day period. Treatment will include: manual, TherEx, therAct, neuro Ranulfo, self care and modalities as needed. to distal and lateral quad  DTM to HS in supine and prone    HEP: laq's, knee ext stretch, bridges, TKE; rolling pin to distal and lateral quad   Charges: Manual - 1, TherEx - 2     Total Timed Treatment: 41 min  Total Treatment Time: 41 min

## 2020-12-21 ENCOUNTER — TELEPHONE (OUTPATIENT)
Dept: CASE MANAGEMENT | Age: 75
End: 2020-12-21

## 2020-12-22 ENCOUNTER — OFFICE VISIT (OUTPATIENT)
Dept: PHYSICAL THERAPY | Facility: HOSPITAL | Age: 75
End: 2020-12-22
Attending: PHYSICIAN ASSISTANT
Payer: MEDICARE

## 2020-12-22 PROCEDURE — 97140 MANUAL THERAPY 1/> REGIONS: CPT

## 2020-12-22 PROCEDURE — 97110 THERAPEUTIC EXERCISES: CPT

## 2020-12-22 NOTE — PROGRESS NOTES
Dx: S/P total knee arthroplasty, right (Z96.651)         Insurance (Authorized # of Visits): 10 visits per plan     Maged Parsons MA [facility] [place])  Authorizing Physician: Dr. Ronen Johansen    Fall Risk: standard           Precautions: Fall Risk, h/o DVT     Subjective:  Pt stat Physical Therapy 1-2 x/week or a total of 10 visits over a 90 day period. Treatment will include: manual, TherEx, therAct, neuro Ranulfo, self care and modalities as needed.         Certification From: 71/54/8269  To:2/22/2021 12/11/2020  Tx: 13/19 12/16/2 radu, TKE; rolling pin to distal and lateral quad   Charges: Manual - 1, TherEx - 2     Total Timed Treatment: 39 min  Total Treatment Time: 39 min

## 2020-12-29 ENCOUNTER — OFFICE VISIT (OUTPATIENT)
Dept: PHYSICAL THERAPY | Facility: HOSPITAL | Age: 75
End: 2020-12-29
Attending: PHYSICIAN ASSISTANT
Payer: MEDICARE

## 2020-12-29 PROCEDURE — 97140 MANUAL THERAPY 1/> REGIONS: CPT

## 2020-12-29 PROCEDURE — 97110 THERAPEUTIC EXERCISES: CPT

## 2020-12-29 NOTE — PROGRESS NOTES
Dx: S/P total knee arthroplasty, right (Z96.651)         Insurance (Authorized # of Visits): 10 visits per plan     Patt Ramirez MA [facility])  Authorizing Physician: Dr. Nayan Thomas    Fall Risk: standard           Precautions: Fall Risk, h/o DVT     Subjective:  Pt stat Therapy 1-2 x/week or a total of 10 visits over a 90 day period. Treatment will include: manual, TherEx, therAct, neuro Ranulfo, self care and modalities as needed.         Certification From: 03/91/3493  To:2/22/2021 12/16/2020  Tx: 14/19 12/18/2020  Tx: II-III  STM/DTM to distal and lateral quad  DTM to HS in supine and prone   Scar tissue massage    HEP: laq's, knee ext stretch, bridges, TKE; rolling pin to distal and lateral quad   Charges: Manual - 1, TherEx - 2     Total Timed Treatment: 42 min  Total

## 2020-12-31 ENCOUNTER — OFFICE VISIT (OUTPATIENT)
Dept: PHYSICAL THERAPY | Facility: HOSPITAL | Age: 75
End: 2020-12-31
Attending: PHYSICIAN ASSISTANT
Payer: MEDICARE

## 2020-12-31 PROCEDURE — 97140 MANUAL THERAPY 1/> REGIONS: CPT

## 2020-12-31 PROCEDURE — 97110 THERAPEUTIC EXERCISES: CPT

## 2020-12-31 NOTE — PROGRESS NOTES
Dx: S/P total knee arthroplasty, right (Z96.651)         Insurance (Authorized # of Visits): 10 visits per plan     Maegd Parsons MA [place])  Authorizing Physician: Dr. Ronen Johansen    Fall Risk: standard           Precautions: Fall Risk, h/o DVT     Subjective:  Pt stat comprehensive HEP to maintain progress achieved in PT - met for current program       Plan: Continue skilled Physical Therapy 1-2 x/week or a total of 10 visits over a 90 day period.  Treatment will include: manual, TherEx, therAct, neuro Ranulfo, self care an distal and lateral quad  DTM to HS in supine and prone   Scar tissue massage  Manual:  STM/DTM to distal and lateral quad  Scar tissue massage    HEP: laq's, knee ext stretch, bridges, TKE; rolling pin to distal and lateral quad   Charges: Manual - 1, Ther

## 2021-01-04 ENCOUNTER — OFFICE VISIT (OUTPATIENT)
Dept: PHYSICAL THERAPY | Facility: HOSPITAL | Age: 76
End: 2021-01-04
Attending: PHYSICIAN ASSISTANT
Payer: MEDICARE

## 2021-01-04 PROCEDURE — 97110 THERAPEUTIC EXERCISES: CPT

## 2021-01-04 PROCEDURE — 97140 MANUAL THERAPY 1/> REGIONS: CPT

## 2021-01-04 NOTE — PROGRESS NOTES
ProgressSummary  Pt has attended 19 visits in Physical Therapy.      Dx: S/P total knee arthroplasty, right (Z96.651)         Insurance (Authorized # of Visits): 10 visits per plan     Desmond Velazquez MA [facility])  Authorizing Physician: Dr. Ney Del Castillo: stand strength to 4+/5 to ascend 1 flight of stairs reciprocally without UE assist - met   · Pt will increase hip and knee strength to grossly 4+/5 to be able to get up and down from the floor safely - met   · Pt will demonstrate increased hip ER/ABD strength to mins  -Heel slides with sheet for end range stretch - 10x5\"  -LAQ's (3#) - 2x15, 2\" holds  -Step-ups forward and lateral onto 8 in step with knee  - 15 ea B (2 sets for fwd)   -SLS on bosu with blue side up - 3x10\" B   Marches on bosu - 2x15 B  Th Manual - 1, TherEx - 2     Total Timed Treatment: 45 min  Total Treatment Time: 45 min

## 2021-01-06 ENCOUNTER — APPOINTMENT (OUTPATIENT)
Dept: PHYSICAL THERAPY | Facility: HOSPITAL | Age: 76
End: 2021-01-06
Attending: PHYSICIAN ASSISTANT
Payer: MEDICARE

## 2021-01-12 ENCOUNTER — OFFICE VISIT (OUTPATIENT)
Dept: PHYSICAL THERAPY | Facility: HOSPITAL | Age: 76
End: 2021-01-12
Attending: PHYSICIAN ASSISTANT
Payer: MEDICARE

## 2021-01-12 PROCEDURE — 97140 MANUAL THERAPY 1/> REGIONS: CPT

## 2021-01-12 PROCEDURE — 97110 THERAPEUTIC EXERCISES: CPT

## 2021-01-12 NOTE — PROGRESS NOTES
Dx: S/P total knee arthroplasty, right (Z96.651)         Insurance (Authorized # of Visits): 10 visits per plan     Andria Foote MA [facility])  Authorizing Physician: Dr. Christiana Hidalgo    Fall Risk: standard           Precautions: Fall Risk, h/o DVT     Subjective:  Pt stat surfaces such as grass - met   · Pt will be independent and compliant with comprehensive HEP to maintain progress achieved in PT - met for current program    Plan: Continue skilled Physical Therapy 1-2 x/week or a total of 5 visits over a 90 day period.  Aniket Herring HS in supine and prone   Scar tissue massage  Manual:  STM/DTM to distal and lateral quad  Scar tissue massage  Manual:  STM/DTM to distal and lateral quad  Scar tissue massage  Manual:  STM/DTM to distal and lateral quad  Scar tissue massage      Neuro Re

## 2021-01-18 ENCOUNTER — TELEPHONE (OUTPATIENT)
Dept: CASE MANAGEMENT | Age: 76
End: 2021-01-18

## 2021-01-18 DIAGNOSIS — R92.8 ABNORMAL MAMMOGRAM: Primary | ICD-10-CM

## 2021-01-18 NOTE — TELEPHONE ENCOUNTER
Called and left a voicemail asking for pt to return call to give information below   When patient calls back please inform that order has been placed

## 2021-01-18 NOTE — TELEPHONE ENCOUNTER
Pt calling requesting a referral for a bilateral diagnostic mammogram.Please place referral if you agree with plan of care.

## 2021-01-20 ENCOUNTER — OFFICE VISIT (OUTPATIENT)
Dept: PHYSICAL THERAPY | Facility: HOSPITAL | Age: 76
End: 2021-01-20
Attending: PHYSICIAN ASSISTANT
Payer: MEDICARE

## 2021-01-20 PROCEDURE — 97110 THERAPEUTIC EXERCISES: CPT

## 2021-01-20 PROCEDURE — 97140 MANUAL THERAPY 1/> REGIONS: CPT

## 2021-01-20 NOTE — PROGRESS NOTES
Dx: S/P total knee arthroplasty, right (Z96.651)         Insurance (Authorized # of Visits): 10 visits per plan     Samanta Iyer MA [facility])  Authorizing Physician: Dr. Noel Leone    Fall Risk: standard           Precautions: Fall Risk, h/o DVT     Subjective:  Pt stat grass - met   · Pt will be independent and compliant with comprehensive HEP to maintain progress achieved in PT - met for current program    Plan: Continue skilled Physical Therapy 1-2 x/week or a total of 5 visits over a 90 day period.  Treatment will incl tissue massage   Patellar mobs - inf and medial - grd II - performed in ext and in flexed positions   Tibiofemoral distraction in seated - grd II    Neuro Ranulfo:   -SLS - 1x20\" B   -Tandem stance on ground and then progressed to foam - 2x20\" Victor Manuel Milner

## 2021-01-25 ENCOUNTER — TELEPHONE (OUTPATIENT)
Dept: INTERNAL MEDICINE CLINIC | Facility: CLINIC | Age: 76
End: 2021-01-25

## 2021-01-26 ENCOUNTER — TELEPHONE (OUTPATIENT)
Dept: PHYSICAL THERAPY | Facility: HOSPITAL | Age: 76
End: 2021-01-26

## 2021-01-27 ENCOUNTER — APPOINTMENT (OUTPATIENT)
Dept: PHYSICAL THERAPY | Facility: HOSPITAL | Age: 76
End: 2021-01-27
Attending: PHYSICIAN ASSISTANT
Payer: MEDICARE

## 2021-02-03 ENCOUNTER — HOSPITAL ENCOUNTER (OUTPATIENT)
Dept: ULTRASOUND IMAGING | Facility: HOSPITAL | Age: 76
Discharge: HOME | End: 2021-02-03
Attending: INTERNAL MEDICINE
Payer: MEDICARE

## 2021-02-03 ENCOUNTER — TELEPHONE (OUTPATIENT)
Dept: INTERNAL MEDICINE CLINIC | Facility: CLINIC | Age: 76
End: 2021-02-03

## 2021-02-03 ENCOUNTER — HOSPITAL ENCOUNTER (OUTPATIENT)
Dept: MAMMOGRAPHY | Facility: HOSPITAL | Age: 76
Discharge: HOME | End: 2021-02-03
Attending: INTERNAL MEDICINE
Payer: MEDICARE

## 2021-02-03 DIAGNOSIS — R92.8 ABNORMAL MAMMOGRAM OF RIGHT BREAST: Primary | ICD-10-CM

## 2021-02-03 DIAGNOSIS — R92.8 ABNORMAL MAMMOGRAM: ICD-10-CM

## 2021-02-03 PROCEDURE — 77062 BREAST TOMOSYNTHESIS BI: CPT | Performed by: INTERNAL MEDICINE

## 2021-02-03 PROCEDURE — 77066 DX MAMMO INCL CAD BI: CPT | Performed by: INTERNAL MEDICINE

## 2021-02-03 PROCEDURE — 76642 ULTRASOUND BREAST LIMITED: CPT | Performed by: INTERNAL MEDICINE

## 2021-02-03 NOTE — IMAGING NOTE
Discussed recommended breast biopsy with patient. Patient is being recommended for stereotactic biopsy of the right Breast   For calcs by . Dr Barb Sotomayor. She is on aspirin 325 mg.   She was instructed to contact ordering Md for guidelines if able to go off Medication Sig Dispense Refill   • CELECOXIB 200 MG Oral Cap TAKE 1 CAPSULE(200 MG) BY MOUTH TWICE DAILY 180 capsule 0   • HYDROcodone-acetaminophen 7.5-325 MG Oral Tab Take 1 tablet by mouth every 6 (six) hours as needed for Pain.  No alcohol or driving breast in compression. Mammography imaging will be used to locate the area in question that was seen on your previous breast imaging.   The Radiologist will then inject a local numbing medication into the area and then use a needle to collect cells from th

## 2021-02-04 ENCOUNTER — TELEPHONE (OUTPATIENT)
Dept: INTERNAL MEDICINE CLINIC | Facility: CLINIC | Age: 76
End: 2021-02-04

## 2021-02-04 NOTE — TELEPHONE ENCOUNTER
Pt said she is having a procedure done on her breast on 2/10 and was told to stop taking Rx asprin 325 MG for 7 days. Pt was told the Dr would have to send the ok to fax # [phone] Attention Jose Angel Harkins.  Please advise

## 2021-02-10 ENCOUNTER — HOSPITAL ENCOUNTER (OUTPATIENT)
Dept: MAMMOGRAPHY | Facility: HOSPITAL | Age: 76
Discharge: HOME | End: 2021-02-10
Attending: INTERNAL MEDICINE
Payer: MEDICARE

## 2021-02-10 VITALS — SYSTOLIC BLOOD PRESSURE: 118 MMHG | DIASTOLIC BLOOD PRESSURE: 67 MMHG | HEART RATE: 77 BPM

## 2021-02-10 DIAGNOSIS — R92.8 ABNORMAL MAMMOGRAM OF RIGHT BREAST: ICD-10-CM

## 2021-02-10 PROCEDURE — 88305 TISSUE EXAM BY PATHOLOGIST: CPT | Performed by: INTERNAL MEDICINE

## 2021-02-10 PROCEDURE — 19081 BX BREAST 1ST LESION STRTCTC: CPT | Performed by: INTERNAL MEDICINE

## 2021-02-10 PROCEDURE — 88360 TUMOR IMMUNOHISTOCHEM/MANUAL: CPT | Performed by: INTERNAL MEDICINE

## 2021-02-10 NOTE — PROCEDURES
[facility]  Procedure Note    Ibis Bates Patient Status:  Outpatient    1945 MRN [id number]   Location [address] [facility] Attending Michelle Isaac, [address] [facility] Day # 0 PCP Mavis Brown MD     Proc

## 2021-02-11 PROBLEM — D05.11 DUCTAL CARCINOMA IN SITU (DCIS) OF RIGHT BREAST: Status: ACTIVE | Noted: 2021-02-01

## 2021-02-12 ENCOUNTER — TELEPHONE (OUTPATIENT)
Dept: HEMATOLOGY/ONCOLOGY | Facility: HOSPITAL | Age: 76
End: 2021-02-12

## 2021-02-12 DIAGNOSIS — D05.11 DUCTAL CARCINOMA IN SITU (DCIS) OF RIGHT BREAST: Primary | ICD-10-CM

## 2021-02-12 NOTE — TELEPHONE ENCOUNTER
Patient is s/p  Right breast stereotactic breast biopsy, performed 2/10/21 at [facility].  Patient has been referred to the Breast Biopsy Result Clinic by her PCP, Dr. Brandt Dos Santos, for discussion of biopsy results.     Patient phoned by myself and Dr. Aida Wynn

## 2021-02-13 DIAGNOSIS — Z23 NEED FOR VACCINATION: ICD-10-CM

## 2021-02-17 ENCOUNTER — OFFICE VISIT (OUTPATIENT)
Dept: HEMATOLOGY/ONCOLOGY | Facility: HOSPITAL | Age: 76
End: 2021-02-17
Attending: INTERNAL MEDICINE
Payer: MEDICARE

## 2021-02-17 ENCOUNTER — OFFICE VISIT (OUTPATIENT)
Dept: SURGERY | Facility: CLINIC | Age: 76
End: 2021-02-17
Payer: MEDICARE

## 2021-02-17 ENCOUNTER — NURSE NAVIGATOR ENCOUNTER (OUTPATIENT)
Dept: HEMATOLOGY/ONCOLOGY | Facility: HOSPITAL | Age: 76
End: 2021-02-17

## 2021-02-17 VITALS
HEART RATE: 72 BPM | HEIGHT: 67.5 IN | BODY MASS INDEX: 29.16 KG/M2 | WEIGHT: 188 LBS | SYSTOLIC BLOOD PRESSURE: 124 MMHG | OXYGEN SATURATION: 98 % | DIASTOLIC BLOOD PRESSURE: 65 MMHG | TEMPERATURE: 98 F | RESPIRATION RATE: 16 BRPM

## 2021-02-17 VITALS — BODY MASS INDEX: 26.06 KG/M2 | HEIGHT: 67.5 IN | WEIGHT: 168 LBS

## 2021-02-17 DIAGNOSIS — D05.11 DUCTAL CARCINOMA IN SITU (DCIS) OF RIGHT BREAST: Primary | ICD-10-CM

## 2021-02-17 PROCEDURE — 3008F BODY MASS INDEX DOCD: CPT | Performed by: SURGERY

## 2021-02-17 PROCEDURE — 99205 OFFICE O/P NEW HI 60 MIN: CPT | Performed by: INTERNAL MEDICINE

## 2021-02-17 PROCEDURE — 99214 OFFICE O/P EST MOD 30 MIN: CPT | Performed by: SURGERY

## 2021-02-18 NOTE — H&P (VIEW-ONLY)
History and Physical      Ora Newsome is a 67year old female. HPI   Patient presents with:  Breast Cancer: Newly diagnosed breast cancer, right breast. Pt states found on routine mammogram. Biopsy done on 2/10/21.  Pt denies pain, drainage, fever, Performed by Lilibeth Hernandez MD at [facility] MAIN OR   • [address]   • KNEE ARTHROSCOPY Right 1997   • KNEE TOTAL REPLACEMENT Right 10/9/2020    Performed by Stanford Mathis MD at [address] [place] [address] OR   • SALPINGECTOMY Left 1995    Ectopic pregnanc Years:  2.00        Pack years: 1        Types: Cigarettes        Quit date: 1975        Years since quittin.2      Smokeless tobacco: Never Used    Substance and Sexual Activity      Alcohol use: Not Currently        Alcohol/week: 0.0 standard d fibroglandular density. FINDINGS: There is an oval circumscribed mass in the lower central anterior depth left breast measuring 1.9 cm. This has mixed density suggesting hamartoma.   There is an oval partially circumscribed mass in the upper outer left br changed compared to the prior ultrasound. This has been followed since February 2020. There is no internal vascularity. 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 Short-term six-month follow-up diagnostic imaging is recommended to establish stability.   At parenchymal pattern in the left breast including areas of asymmetry and oval masses is not significantly changed. No new suspicious mass, distortion or calcification.   Targeted left breast ultrasound demonstrates a cyst at 5 o'clock 7 cm from the nipple m (Leica, monoclonal, clone 6 F11) status: 93% (Positive, strong intensity)        ASSESSMENT/PLAN   Assessment   Ductal carcinoma in situ (dcis) of right breast  (primary encounter diagnosis)    67year old female with newly dx R breast DCIS - found during later date. Patient acknowledged they understand all the potential risks and would like to proceed with the treatment/procedure/surgery.

## 2021-02-18 NOTE — PROGRESS NOTES
Patient presents to the [facility] for consultation with Dr. Ata Gallo. She is accompanied by her niece. Introduced myself to patient as Breast RN Navigator.   Shared my role to provide support and education, assist with her care coordination, as well a

## 2021-02-18 NOTE — H&P
History and Physical      Ora Newsome is a 67year old female. HPI   Patient presents with:  Breast Cancer: Newly diagnosed breast cancer, right breast. Pt states found on routine mammogram. Biopsy done on 2/10/21.  Pt denies pain, drainage, fever, Performed by Joanna Little MD at [address] [place] [address] MAIN OR   • [address]   • KNEE ARTHROSCOPY Right 1997   • KNEE TOTAL REPLACEMENT Right 10/9/2020    Performed by Claude Guo MD at [address] [place] [address] OR   • SALPINGECTOMY Left 1995    Ectopic pregnanc Years:  2.00        Pack years: 1        Types: Cigarettes        Quit date: 1975        Years since quittin.2      Smokeless tobacco: Never Used    Substance and Sexual Activity      Alcohol use: Not Currently        Alcohol/week: 0.0 standard d fibroglandular density. FINDINGS: There is an oval circumscribed mass in the lower central anterior depth left breast measuring 1.9 cm. This has mixed density suggesting hamartoma.   There is an oval partially circumscribed mass in the upper outer left br changed compared to the prior ultrasound. This has been followed since February 2020. There is no internal vascularity. 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 Short-term six-month follow-up diagnostic imaging is recommended to establish stability.   At parenchymal pattern in the left breast including areas of asymmetry and oval masses is not significantly changed. No new suspicious mass, distortion or calcification.   Targeted left breast ultrasound demonstrates a cyst at 5 o'clock 7 cm from the nipple m (Leica, monoclonal, clone 6 F11) status: 93% (Positive, strong intensity)        ASSESSMENT/PLAN   Assessment   Ductal carcinoma in situ (dcis) of right breast  (primary encounter diagnosis)    67year old female with newly dx R breast DCIS - found during later date. Patient acknowledged they understand all the potential risks and would like to proceed with the treatment/procedure/surgery.

## 2021-02-18 NOTE — CONSULTS
[facility]    PATIENT'S NAME: KRIS [facility] PHYSICIAN: Ning Hernandes.  Neris Ochoa MD   PATIENT ACCOUNT #: [de-identified] LOCATION: [address] [place] [address] RECORD #: [id number] YOB: 1945   CONSULTATION DATE: 02/17/2021       CANCER MARCUS room air. Blood pressure is 124/65. GENERAL:  In no acute distress. Alert and oriented. HEENT:  Moist mucous membranes. Oropharynx clear. [place]url[place] NECK:  Supple. LUNGS:  Symmetric expansion. Nonlabored breathing. HEART:  Good distal pulses.   Regular rat

## 2021-02-19 ENCOUNTER — TELEPHONE (OUTPATIENT)
Dept: SURGERY | Facility: CLINIC | Age: 76
End: 2021-02-19

## 2021-02-19 DIAGNOSIS — D05.90 CARCINOMA IN SITU OF BREAST, UNSPECIFIED LATERALITY, UNSPECIFIED TYPE: Primary | ICD-10-CM

## 2021-02-19 RX ORDER — ATORVASTATIN CALCIUM 20 MG/1
20 TABLET, FILM COATED ORAL EVERY EVENING
Qty: 90 TABLET | Refills: 1 | Status: SHIPPED | OUTPATIENT
Start: 2021-02-19 | End: 2021-09-08

## 2021-02-19 NOTE — TELEPHONE ENCOUNTER
Surgery scheduled for 3/9/2021. Instructions given and mailed to patient with verbal understanding received.

## 2021-02-25 ENCOUNTER — TELEPHONE (OUTPATIENT)
Dept: INTERNAL MEDICINE CLINIC | Facility: CLINIC | Age: 76
End: 2021-02-25

## 2021-02-25 NOTE — TELEPHONE ENCOUNTER
Pt is due for Medicare annual health assessment with Skylar Maddox, [address] [place] [address] to schedule

## 2021-03-07 ENCOUNTER — LAB ENCOUNTER (OUTPATIENT)
Dept: LAB | Facility: HOSPITAL | Age: 76
End: 2021-03-07
Attending: SURGERY
Payer: MEDICARE

## 2021-03-07 DIAGNOSIS — Z01.818 PREOP TESTING: ICD-10-CM

## 2021-03-08 ENCOUNTER — TELEPHONE (OUTPATIENT)
Dept: HEMATOLOGY/ONCOLOGY | Facility: HOSPITAL | Age: 76
End: 2021-03-08

## 2021-03-08 LAB — SARS-COV-2 RNA RESP QL NAA+PROBE: NOT DETECTED

## 2021-03-08 NOTE — TELEPHONE ENCOUNTER
Patient is scheduled for right total mastectomy and right sentinel lymph node biopsy tomorrow with Dr. Amanda Philip. Phoned patient to address any questions or concerns she may have related to her scheduled surgery. Patient denies questions at this time.

## 2021-03-09 ENCOUNTER — HOSPITAL ENCOUNTER (OUTPATIENT)
Facility: HOSPITAL | Age: 76
Setting detail: HOSPITAL OUTPATIENT SURGERY
Discharge: HOME | End: 2021-03-09
Attending: SURGERY | Admitting: SURGERY
Payer: MEDICARE

## 2021-03-09 ENCOUNTER — ANESTHESIA (OUTPATIENT)
Dept: SURGERY | Facility: HOSPITAL | Age: 76
End: 2021-03-09
Payer: MEDICARE

## 2021-03-09 ENCOUNTER — ANESTHESIA EVENT (OUTPATIENT)
Dept: SURGERY | Facility: HOSPITAL | Age: 76
End: 2021-03-09
Payer: MEDICARE

## 2021-03-09 ENCOUNTER — HOSPITAL ENCOUNTER (OUTPATIENT)
Dept: NUCLEAR MEDICINE | Facility: HOSPITAL | Age: 76
Discharge: HOME | End: 2021-03-09
Attending: SURGERY
Payer: MEDICARE

## 2021-03-09 VITALS
WEIGHT: 190 LBS | TEMPERATURE: 99 F | HEART RATE: 77 BPM | BODY MASS INDEX: 29.47 KG/M2 | SYSTOLIC BLOOD PRESSURE: 129 MMHG | DIASTOLIC BLOOD PRESSURE: 59 MMHG | OXYGEN SATURATION: 96 % | HEIGHT: 67.5 IN | RESPIRATION RATE: 18 BRPM

## 2021-03-09 DIAGNOSIS — D05.90 CARCINOMA IN SITU OF BREAST, UNSPECIFIED LATERALITY, UNSPECIFIED TYPE: ICD-10-CM

## 2021-03-09 DIAGNOSIS — Z01.818 PREOP TESTING: Primary | ICD-10-CM

## 2021-03-09 PROBLEM — C50.919 BREAST CANCER (HCC): Status: ACTIVE | Noted: 2021-03-09

## 2021-03-09 PROCEDURE — 19303 MAST SIMPLE COMPLETE: CPT | Performed by: SURGERY

## 2021-03-09 PROCEDURE — 38525 BIOPSY/REMOVAL LYMPH NODES: CPT | Performed by: SURGERY

## 2021-03-09 PROCEDURE — 38900 IO MAP OF SENT LYMPH NODE: CPT | Performed by: SURGERY

## 2021-03-09 PROCEDURE — 0HTT0ZZ RESECTION OF RIGHT BREAST, OPEN APPROACH: ICD-10-PCS | Performed by: SURGERY

## 2021-03-09 PROCEDURE — 07B50ZX EXCISION OF RIGHT AXILLARY LYMPHATIC, OPEN APPROACH, DIAGNOSTIC: ICD-10-PCS | Performed by: SURGERY

## 2021-03-09 PROCEDURE — 78195 LYMPH SYSTEM IMAGING: CPT | Performed by: SURGERY

## 2021-03-09 RX ORDER — ONDANSETRON 2 MG/ML
4 INJECTION INTRAMUSCULAR; INTRAVENOUS ONCE AS NEEDED
Status: DISCONTINUED | OUTPATIENT
Start: 2021-03-09 | End: 2021-03-09

## 2021-03-09 RX ORDER — LIDOCAINE HYDROCHLORIDE 40 MG/ML
SOLUTION TOPICAL AS NEEDED
Status: DISCONTINUED | OUTPATIENT
Start: 2021-03-09 | End: 2021-03-09 | Stop reason: SURG

## 2021-03-09 RX ORDER — NALOXONE HYDROCHLORIDE 0.4 MG/ML
80 INJECTION, SOLUTION INTRAMUSCULAR; INTRAVENOUS; SUBCUTANEOUS AS NEEDED
Status: DISCONTINUED | OUTPATIENT
Start: 2021-03-09 | End: 2021-03-09

## 2021-03-09 RX ORDER — HYDROCODONE BITARTRATE AND ACETAMINOPHEN 5; 325 MG/1; MG/1
1 TABLET ORAL AS NEEDED
Status: DISCONTINUED | OUTPATIENT
Start: 2021-03-09 | End: 2021-03-09

## 2021-03-09 RX ORDER — DEXAMETHASONE SODIUM PHOSPHATE 4 MG/ML
VIAL (ML) INJECTION AS NEEDED
Status: DISCONTINUED | OUTPATIENT
Start: 2021-03-09 | End: 2021-03-09 | Stop reason: SURG

## 2021-03-09 RX ORDER — ATORVASTATIN CALCIUM 20 MG/1
20 TABLET, FILM COATED ORAL EVERY EVENING
Status: CANCELLED | OUTPATIENT
Start: 2021-03-09

## 2021-03-09 RX ORDER — HYDROMORPHONE HYDROCHLORIDE 1 MG/ML
0.4 INJECTION, SOLUTION INTRAMUSCULAR; INTRAVENOUS; SUBCUTANEOUS EVERY 5 MIN PRN
Status: DISCONTINUED | OUTPATIENT
Start: 2021-03-09 | End: 2021-03-09

## 2021-03-09 RX ORDER — MORPHINE SULFATE 4 MG/ML
2 INJECTION, SOLUTION INTRAMUSCULAR; INTRAVENOUS EVERY 10 MIN PRN
Status: DISCONTINUED | OUTPATIENT
Start: 2021-03-09 | End: 2021-03-09

## 2021-03-09 RX ORDER — MORPHINE SULFATE 4 MG/ML
4 INJECTION, SOLUTION INTRAMUSCULAR; INTRAVENOUS EVERY 10 MIN PRN
Status: DISCONTINUED | OUTPATIENT
Start: 2021-03-09 | End: 2021-03-09

## 2021-03-09 RX ORDER — CEFAZOLIN SODIUM/WATER 2 G/20 ML
2 SYRINGE (ML) INTRAVENOUS ONCE
Status: COMPLETED | OUTPATIENT
Start: 2021-03-09 | End: 2021-03-09

## 2021-03-09 RX ORDER — LIDOCAINE HYDROCHLORIDE 10 MG/ML
INJECTION, SOLUTION EPIDURAL; INFILTRATION; INTRACAUDAL; PERINEURAL AS NEEDED
Status: DISCONTINUED | OUTPATIENT
Start: 2021-03-09 | End: 2021-03-09 | Stop reason: SURG

## 2021-03-09 RX ORDER — HALOPERIDOL 5 MG/ML
0.25 INJECTION INTRAMUSCULAR ONCE AS NEEDED
Status: DISCONTINUED | OUTPATIENT
Start: 2021-03-09 | End: 2021-03-09

## 2021-03-09 RX ORDER — HYDROMORPHONE HYDROCHLORIDE 1 MG/ML
0.2 INJECTION, SOLUTION INTRAMUSCULAR; INTRAVENOUS; SUBCUTANEOUS EVERY 5 MIN PRN
Status: DISCONTINUED | OUTPATIENT
Start: 2021-03-09 | End: 2021-03-09

## 2021-03-09 RX ORDER — HYDROCODONE BITARTRATE AND ACETAMINOPHEN 5; 325 MG/1; MG/1
1 TABLET ORAL EVERY 6 HOURS PRN
Qty: 20 TABLET | Refills: 0 | Status: SHIPPED | OUTPATIENT
Start: 2021-03-09 | End: 2021-03-19 | Stop reason: ALTCHOICE

## 2021-03-09 RX ORDER — ISOSULFAN BLUE 50 MG/5ML
INJECTION, SOLUTION SUBCUTANEOUS AS NEEDED
Status: DISCONTINUED | OUTPATIENT
Start: 2021-03-09 | End: 2021-03-09 | Stop reason: HOSPADM

## 2021-03-09 RX ORDER — PANTOPRAZOLE SODIUM 40 MG/1
40 TABLET, DELAYED RELEASE ORAL
Status: CANCELLED | OUTPATIENT
Start: 2021-03-10

## 2021-03-09 RX ORDER — ACETAMINOPHEN 500 MG
1000 TABLET ORAL ONCE
Status: COMPLETED | OUTPATIENT
Start: 2021-03-09 | End: 2021-03-09

## 2021-03-09 RX ORDER — ONDANSETRON 2 MG/ML
INJECTION INTRAMUSCULAR; INTRAVENOUS AS NEEDED
Status: DISCONTINUED | OUTPATIENT
Start: 2021-03-09 | End: 2021-03-09 | Stop reason: SURG

## 2021-03-09 RX ORDER — MIDAZOLAM HYDROCHLORIDE 1 MG/ML
INJECTION INTRAMUSCULAR; INTRAVENOUS AS NEEDED
Status: DISCONTINUED | OUTPATIENT
Start: 2021-03-09 | End: 2021-03-09 | Stop reason: SURG

## 2021-03-09 RX ORDER — SODIUM CHLORIDE, SODIUM LACTATE, POTASSIUM CHLORIDE, CALCIUM CHLORIDE 600; 310; 30; 20 MG/100ML; MG/100ML; MG/100ML; MG/100ML
INJECTION, SOLUTION INTRAVENOUS CONTINUOUS
Status: DISCONTINUED | OUTPATIENT
Start: 2021-03-09 | End: 2021-03-09

## 2021-03-09 RX ORDER — HYDROMORPHONE HYDROCHLORIDE 1 MG/ML
0.6 INJECTION, SOLUTION INTRAMUSCULAR; INTRAVENOUS; SUBCUTANEOUS EVERY 5 MIN PRN
Status: DISCONTINUED | OUTPATIENT
Start: 2021-03-09 | End: 2021-03-09

## 2021-03-09 RX ORDER — MORPHINE SULFATE 10 MG/ML
6 INJECTION, SOLUTION INTRAMUSCULAR; INTRAVENOUS EVERY 10 MIN PRN
Status: DISCONTINUED | OUTPATIENT
Start: 2021-03-09 | End: 2021-03-09

## 2021-03-09 RX ORDER — EPHEDRINE SULFATE 50 MG/ML
INJECTION, SOLUTION INTRAVENOUS AS NEEDED
Status: DISCONTINUED | OUTPATIENT
Start: 2021-03-09 | End: 2021-03-09 | Stop reason: SURG

## 2021-03-09 RX ORDER — HYDROCODONE BITARTRATE AND ACETAMINOPHEN 5; 325 MG/1; MG/1
2 TABLET ORAL AS NEEDED
Status: DISCONTINUED | OUTPATIENT
Start: 2021-03-09 | End: 2021-03-09

## 2021-03-09 RX ORDER — PROCHLORPERAZINE EDISYLATE 5 MG/ML
5 INJECTION INTRAMUSCULAR; INTRAVENOUS ONCE AS NEEDED
Status: DISCONTINUED | OUTPATIENT
Start: 2021-03-09 | End: 2021-03-09

## 2021-03-09 RX ADMIN — EPHEDRINE SULFATE 10 MG: 50 INJECTION, SOLUTION INTRAVENOUS at 10:29:00

## 2021-03-09 RX ADMIN — MIDAZOLAM HYDROCHLORIDE 2 MG: 1 INJECTION INTRAMUSCULAR; INTRAVENOUS at 10:21:00

## 2021-03-09 RX ADMIN — DEXAMETHASONE SODIUM PHOSPHATE 4 MG: 4 MG/ML VIAL (ML) INJECTION at 10:21:00

## 2021-03-09 RX ADMIN — LIDOCAINE HYDROCHLORIDE 50 MG: 10 INJECTION, SOLUTION EPIDURAL; INFILTRATION; INTRACAUDAL; PERINEURAL at 10:21:00

## 2021-03-09 RX ADMIN — SODIUM CHLORIDE, SODIUM LACTATE, POTASSIUM CHLORIDE, CALCIUM CHLORIDE: 600; 310; 30; 20 INJECTION, SOLUTION INTRAVENOUS at 10:21:00

## 2021-03-09 RX ADMIN — CEFAZOLIN SODIUM/WATER 2 G: 2 G/20 ML SYRINGE (ML) INTRAVENOUS at 10:30:00

## 2021-03-09 RX ADMIN — ONDANSETRON 4 MG: 2 INJECTION INTRAMUSCULAR; INTRAVENOUS at 10:21:00

## 2021-03-09 RX ADMIN — LIDOCAINE HYDROCHLORIDE 4 ML: 40 SOLUTION TOPICAL at 10:21:00

## 2021-03-09 NOTE — ANESTHESIA PREPROCEDURE EVALUATION
Anesthesia PreOp Note    HPI:     Mirtha Salinas is a 67year old female who presents for preoperative consultation requested by: Gerhardt Angelica, MD    Date of Surgery: 3/9/2021    Procedure(s):  right total mastectomy and right sentinel lymph node biopsy, focus is 4.5 mm in greatest dimension.    • GERD (gastroesophageal reflux disease)     EGD 11-16 with hiatal hernia and gastritis with biopsies benign and H. pylori testing negative   • High cholesterol    • History of colon polyps    • Hypercholesterolemia time  HYDROcodone-acetaminophen 7.5-325 MG Oral Tab, Take 1 tablet by mouth every 6 (six) hours as needed for Pain. No alcohol or driving on this med. Stop if lethargic or hallucinating.  (Patient not taking: Reported on 10/30/2020 ), Disp: 15 tablet, Rfl: Used    Vaping Use      Vaping Use: Never used    Substance and Sexual Activity      Alcohol use: Not Currently        Alcohol/week: 0.0 standard drinks      Drug use: No      Sexual activity: Not on file    Other Topics      Concerns:        Not on file Cardiovascular - normal exam    ROS comment: Aortic atherosclerosis    Neuro/Psych - negative ROS     GI/Hepatic/Renal    (+) GERD, chronic renal disease CRI,     Endo/Other - negative ROS   Abdominal                Anesthesia Plan:   ASA:  3  Plan:   Gene

## 2021-03-09 NOTE — BRIEF OP NOTE
Pre-Operative Diagnosis: Carcinoma in situ of breast, unspecified laterality, unspecified type [D05.90]     Post-Operative Diagnosis: Carcinoma in situ of breast, unspecified laterality, unspecified type [D05.90]      Procedure Performed:   Procedure(s):

## 2021-03-09 NOTE — ANESTHESIA PROCEDURE NOTES
Airway  Date/Time: 3/9/2021 10:23 AM  Urgency: Elective    Airway not difficult    General Information and Staff    Patient location during procedure: OR  Anesthesiologist: Sanam Berry MD  Performed: anesthesiologist     Indications and Patient Conditio

## 2021-03-09 NOTE — ANESTHESIA POSTPROCEDURE EVALUATION
Patient: Brent Mack    Procedure Summary     Date: 03/09/21 Room / Location: [facility] OR  / [facility] OR    Anesthesia Start: 6679 Anesthesia Stop: 4413    Procedure: right total mastectomy and right sentinel lymph node biopsy, right lymphoscintigrap

## 2021-03-09 NOTE — INTERVAL H&P NOTE
Pre-op Diagnosis: Carcinoma in situ of breast, unspecified laterality, unspecified type [D05.90]    The above referenced H&P was reviewed by Kylah Shetty MD on 3/9/2021, the patient was examined and no significant changes have occurred in the patient's co

## 2021-03-10 ENCOUNTER — TELEPHONE (OUTPATIENT)
Dept: CASE MANAGEMENT | Age: 76
End: 2021-03-10

## 2021-03-10 ENCOUNTER — TELEPHONE (OUTPATIENT)
Dept: HEMATOLOGY/ONCOLOGY | Facility: HOSPITAL | Age: 76
End: 2021-03-10

## 2021-03-10 DIAGNOSIS — Z01.00 EYE EXAM, ROUTINE: Primary | ICD-10-CM

## 2021-03-10 NOTE — TELEPHONE ENCOUNTER
Pt is requesting a referral to see Dr. Rosa Christian for annual eye exam. Please sign off on referral so pt can schedule appt.

## 2021-03-10 NOTE — OPERATIVE REPORT
[facility]    PATIENT'S NAME: Katty Dowell   ATTENDING PHYSICIAN: Sindi Siegel MD   OPERATING PHYSICIAN: Sindi Siegel MD   PATIENT ACCOUNT#:   [id number]    LOCATION:  [place] [address]  MEDICAL RECORD #:   [id number]       DATE OF blue dye into the subdermal space in 4 quadrants and the breast was massaged for several minutes. Radiotracer uptake was confirmed with the gamma probe in the axilla.   The right chest and upper extremity were then prepped and draped in the usual aseptic f 2-0 silk suture. The surgical wound was then closed with interrupted 2-0 and 3-0 Vicryl deep sutures and then a running 4-0 Vicryl subcuticular suture. This was covered with Dermabond. The drain was secured with gauze and tape.   Overall, the patient lissette

## 2021-03-10 NOTE — TELEPHONE ENCOUNTER
Spoke with patient and was told that referral is in the system but it will take a couple days for insurance to approve. Patient verbalized understanding.

## 2021-03-10 NOTE — TELEPHONE ENCOUNTER
Patient is POD #1 s/p right total mastectomy and sentinel lymph node biopsy. Patient reports feeling well. She denies pain, and shares she has not had to take any PRN medication for pain management since discharge.     Patient shares she received drain ca

## 2021-03-12 ENCOUNTER — TELEPHONE (OUTPATIENT)
Dept: SURGERY | Facility: CLINIC | Age: 76
End: 2021-03-12

## 2021-03-15 ENCOUNTER — TELEPHONE (OUTPATIENT)
Dept: SURGERY | Facility: CLINIC | Age: 76
End: 2021-03-15

## 2021-03-15 NOTE — TELEPHONE ENCOUNTER
Surgery on March 9th. Pt states  Drainage:    3/10 30 CC  3/11 20 CC  3/12 30 CC  3/13 20 CC  3/14 20 CC    Pt given appt for Friday.

## 2021-03-17 ENCOUNTER — APPOINTMENT (OUTPATIENT)
Dept: HEMATOLOGY/ONCOLOGY | Facility: HOSPITAL | Age: 76
End: 2021-03-17
Attending: INTERNAL MEDICINE
Payer: MEDICARE

## 2021-03-19 ENCOUNTER — OFFICE VISIT (OUTPATIENT)
Dept: HEMATOLOGY/ONCOLOGY | Facility: HOSPITAL | Age: 76
End: 2021-03-19
Attending: INTERNAL MEDICINE
Payer: MEDICARE

## 2021-03-19 ENCOUNTER — OFFICE VISIT (OUTPATIENT)
Dept: SURGERY | Facility: CLINIC | Age: 76
End: 2021-03-19
Payer: MEDICARE

## 2021-03-19 VITALS
HEIGHT: 67.5 IN | RESPIRATION RATE: 18 BRPM | OXYGEN SATURATION: 97 % | HEART RATE: 61 BPM | DIASTOLIC BLOOD PRESSURE: 71 MMHG | TEMPERATURE: 97 F | SYSTOLIC BLOOD PRESSURE: 136 MMHG | WEIGHT: 190 LBS | BODY MASS INDEX: 29.47 KG/M2

## 2021-03-19 DIAGNOSIS — D05.11 DUCTAL CARCINOMA IN SITU (DCIS) OF RIGHT BREAST: Primary | ICD-10-CM

## 2021-03-19 DIAGNOSIS — R92.8 ABNORMAL MAMMOGRAM OF LEFT BREAST: ICD-10-CM

## 2021-03-19 PROCEDURE — 99213 OFFICE O/P EST LOW 20 MIN: CPT | Performed by: INTERNAL MEDICINE

## 2021-03-19 PROCEDURE — 99024 POSTOP FOLLOW-UP VISIT: CPT | Performed by: SURGERY

## 2021-03-19 NOTE — PROGRESS NOTES
Postoperative Patient Follow-up      3/19/2021    Sree Taverasia 67year old      HPI  Patient presents with:  Post-Op: Pt here for 1st post op s/p right breast total mastectomy on 3/9/2021. Pt states here for NIURKA drain removal - less than 10 cc.   Pt giovanny breast  (primary encounter diagnosis)    Good progress following R total mastectomy and SLNB for extensive DCIS.   Drain removed, start home therapies - shoulder ROM and chest wall massage, consider formal PTx eval if no progress, medical oncology f/u, L br

## 2021-03-19 NOTE — PROGRESS NOTES
Cancer Center Progress Note    Patient Name: Mirtha Salinas   YOB: 1945   Medical Record Number: [id number]   Attending Physician: Rico Riley M.D.      Chief Complaint:  DCIS    History of Present Illness:  57-year-old female being seen for ESOPHAGOGASTRODUODENOSCOPY (EGD) N/A 11/17/2016    Performed by Kaden Kwan MD at [address] [place] [address] N/A 6/20/2018    Performed by Gerhardt Angelica, MD at [facility] OR   • [address] [place] [address]   • KNEE ARTHROSCOPY Ri the Last Year:       Ran Out of Food in the Last Year:   Transportation Needs:       Lack of Transportation (Medical):       Lack of Transportation (Non-Medical):   Physical Activity:       Days of Exercise per Week:       Minutes of Exercise per Session: premix IV syringe 2 g, 2 g, Intravenous, Once, Madhavi Catalan MD, 2 g at 03/09/21 1030          Allergies:  No Known Allergies     Review of Systems:  All other systems reviewed and negative x12    Vital Signs:  /71 (BP Location: Left arm, Patient Po mammography and is due for follow-up in August.  We will help arrange.       Margarette Luke MD

## 2021-04-28 ENCOUNTER — OFFICE VISIT (OUTPATIENT)
Dept: SURGERY | Facility: CLINIC | Age: 76
End: 2021-04-28
Payer: MEDICARE

## 2021-04-28 DIAGNOSIS — D05.11 DUCTAL CARCINOMA IN SITU (DCIS) OF RIGHT BREAST: Primary | ICD-10-CM

## 2021-04-28 PROCEDURE — 99024 POSTOP FOLLOW-UP VISIT: CPT | Performed by: SURGERY

## 2021-04-28 NOTE — PROGRESS NOTES
Postoperative Patient Follow-up      4/28/2021    Bartolo Mtz 67year old      HPI  Patient presents with:  Post-Op: Pt here for 2 nd post op s/p right breast total mastectomy on 3/9/2021. Pt denies all complaints @ current time.      No chest wall or

## 2021-05-14 ENCOUNTER — OFFICE VISIT (OUTPATIENT)
Dept: HEMATOLOGY/ONCOLOGY | Facility: HOSPITAL | Age: 76
End: 2021-05-14
Attending: NURSE PRACTITIONER
Payer: MEDICARE

## 2021-05-14 DIAGNOSIS — Z71.9 COUNSELING, UNSPECIFIED: ICD-10-CM

## 2021-05-14 DIAGNOSIS — Z08 ENCOUNTER FOR FOLLOW-UP EXAMINATION AFTER COMPLETED TREATMENT FOR MALIGNANT NEOPLASM: ICD-10-CM

## 2021-05-14 DIAGNOSIS — D05.11 DUCTAL CARCINOMA IN SITU (DCIS) OF RIGHT BREAST: Primary | ICD-10-CM

## 2021-05-14 DIAGNOSIS — Z85.3 PERSONAL HISTORY OF BREAST CANCER: Primary | ICD-10-CM

## 2021-05-14 PROCEDURE — 99214 OFFICE O/P EST MOD 30 MIN: CPT | Performed by: NURSE PRACTITIONER

## 2021-05-14 RX ORDER — ASPIRIN 325 MG
325 TABLET ORAL DAILY
COMMUNITY
End: 2021-11-30

## 2021-05-14 NOTE — PROGRESS NOTES
I met with Kendal for a Survivorship Clinic visit to provide a survivorship care plan (SCP) and information related to post-treatment care. She has a diagnosis of Stage 0 right DCIS. She had a right mastectomy with sentinel nodes on 3/9/2021.   She requi including the risk for the cancer coming back or developing another cancer. We reviewed importance of physical activity and a plant based diet. Current BMI is 29. She has resumed regular exercise and eats a healthy diet.   We reviewed skin care and sun sa

## 2021-05-26 ENCOUNTER — TELEPHONE (OUTPATIENT)
Dept: CASE MANAGEMENT | Age: 76
End: 2021-05-26

## 2021-05-26 DIAGNOSIS — Z00.00 ANNUAL PHYSICAL EXAM: Primary | ICD-10-CM

## 2021-05-27 NOTE — TELEPHONE ENCOUNTER
Called pt and left a voicemail informing that labs have been ordered and if he had any questions to contact the office.

## 2021-06-10 ENCOUNTER — LAB ENCOUNTER (OUTPATIENT)
Dept: LAB | Facility: HOSPITAL | Age: 76
End: 2021-06-10
Attending: INTERNAL MEDICINE
Payer: MEDICARE

## 2021-06-10 DIAGNOSIS — Z00.00 ANNUAL PHYSICAL EXAM: ICD-10-CM

## 2021-06-10 PROCEDURE — 80053 COMPREHEN METABOLIC PANEL: CPT

## 2021-06-10 PROCEDURE — 80061 LIPID PANEL: CPT

## 2021-06-10 PROCEDURE — 36415 COLL VENOUS BLD VENIPUNCTURE: CPT

## 2021-06-10 PROCEDURE — 85027 COMPLETE CBC AUTOMATED: CPT

## 2021-06-16 ENCOUNTER — OFFICE VISIT (OUTPATIENT)
Dept: INTERNAL MEDICINE CLINIC | Facility: CLINIC | Age: 76
End: 2021-06-16
Payer: MEDICARE

## 2021-06-16 VITALS
HEART RATE: 67 BPM | DIASTOLIC BLOOD PRESSURE: 65 MMHG | HEIGHT: 67.5 IN | BODY MASS INDEX: 29.53 KG/M2 | SYSTOLIC BLOOD PRESSURE: 101 MMHG | WEIGHT: 190.38 LBS

## 2021-06-16 DIAGNOSIS — Z86.718 HISTORY OF DVT (DEEP VEIN THROMBOSIS): ICD-10-CM

## 2021-06-16 DIAGNOSIS — N18.30 STAGE 3 CHRONIC KIDNEY DISEASE, UNSPECIFIED WHETHER STAGE 3A OR 3B CKD (HCC): ICD-10-CM

## 2021-06-16 DIAGNOSIS — Z00.00 ANNUAL PHYSICAL EXAM: Primary | ICD-10-CM

## 2021-06-16 DIAGNOSIS — I70.0 AORTIC ATHEROSCLEROSIS (HCC): ICD-10-CM

## 2021-06-16 DIAGNOSIS — Z00.00 ENCOUNTER FOR ANNUAL HEALTH EXAMINATION: ICD-10-CM

## 2021-06-16 DIAGNOSIS — E78.00 HYPERCHOLESTEROLEMIA: ICD-10-CM

## 2021-06-16 DIAGNOSIS — C50.911 MALIGNANT NEOPLASM OF RIGHT FEMALE BREAST, UNSPECIFIED ESTROGEN RECEPTOR STATUS, UNSPECIFIED SITE OF BREAST (HCC): ICD-10-CM

## 2021-06-16 DIAGNOSIS — Z86.010 HISTORY OF COLON POLYPS: ICD-10-CM

## 2021-06-16 DIAGNOSIS — K21.9 GASTROESOPHAGEAL REFLUX DISEASE WITHOUT ESOPHAGITIS: ICD-10-CM

## 2021-06-16 DIAGNOSIS — E04.2 MULTINODULAR GOITER: ICD-10-CM

## 2021-06-16 PROCEDURE — 3078F DIAST BP <80 MM HG: CPT | Performed by: INTERNAL MEDICINE

## 2021-06-16 PROCEDURE — 96160 PT-FOCUSED HLTH RISK ASSMT: CPT | Performed by: INTERNAL MEDICINE

## 2021-06-16 PROCEDURE — G0439 PPPS, SUBSEQ VISIT: HCPCS | Performed by: INTERNAL MEDICINE

## 2021-06-16 PROCEDURE — 99397 PER PM REEVAL EST PAT 65+ YR: CPT | Performed by: INTERNAL MEDICINE

## 2021-06-16 PROCEDURE — 90715 TDAP VACCINE 7 YRS/> IM: CPT | Performed by: INTERNAL MEDICINE

## 2021-06-16 PROCEDURE — 3074F SYST BP LT 130 MM HG: CPT | Performed by: INTERNAL MEDICINE

## 2021-06-16 PROCEDURE — 90471 IMMUNIZATION ADMIN: CPT | Performed by: INTERNAL MEDICINE

## 2021-06-16 PROCEDURE — 3008F BODY MASS INDEX DOCD: CPT | Performed by: INTERNAL MEDICINE

## 2021-06-16 NOTE — PATIENT INSTRUCTIONS
Your physical today was normal.  You received a Tdap vaccine today, good for 10 years. Please remember to get a flu shot this fall. Await mammogram scheduled in August.  Please schedule a bone density test.  Contact GI to arrange repeat colonoscopy.   Con every 4 years -    Fecal Occult Blood Test Covered annually -   Bone Density Screening    Bone density screening    Covered every 2 years after age 72 if diagnosed with risk of osteoporosis or estrogen deficiency.     Covered yearly for long-term glucocorti different types of Advance Directives. It also has the State forms available on it's website for anyone to review and print using their home computer and printer. (the forms are also available in [address])  [email or web address]  This link also has informa

## 2021-06-16 NOTE — PROGRESS NOTES
HPI:   Laura Kirkpatrick is a 67year old female who presents this morning for an MA (Medicare Advantage) Supervisit (Once per calendar year).     Since her last visit with me in September, she underwent right TKA in October and most recently right mastectomy but quit greater than 12 months ago.   Social History    Tobacco Use      Smoking status: Former Smoker        Packs/day: 0.50        Years: 2.00        Pack years: 1        Types: Cigarettes        Quit date: 1975        Years since quittin.6 WBC 3.5 (L) 06/10/2021    HGB 11.8 (L) 06/10/2021    .0 06/10/2021        ALLERGIES:   She has No Known Allergies. CURRENT MEDICATIONS:   aspirin 325 MG Oral Tab, Take 325 mg by mouth daily.   atorvastatin 20 MG Oral Tab, Take 1 tablet (20 mg tota REVIEW OF SYSTEMS:   GENERAL: No fever  LUNGS: No cough wheezing or shortness of breath  CARDIAC: No lightheadedness palpitations or chest pain  GI: No anorexia heartburn dysphagia nausea vomiting abdominal pain diarrhea constipation or rectal bleeding and friends have told me they think I may have hearing loss:  No               Visual Acuity  Right Eye Visual Acuity: Uncorrected Right Eye Chart Acuity: 20/30   Left Eye Visual Acuity: Uncorrected Left Eye Chart Acuity: 20/30   Both Eyes Visual Acuity: Un today. Reinforced annual influenza vaccine. Await upcoming mammogram scheduled in August.  Recommend DEXA scan. She is agreeable. Order with scheduling phone number given. Discussed that she is due for repeat colonoscopy. Referral to GI given.   Timbo Coates Friends; Visiting Family     Supplementary Documentation:   [address]   Tests on this list are recommended by your physician but may not be covered, or covered at this frequency, by your insurer.    Please check with your insurance found for this or any previous visit. DEXA Scan Never done   Pap and Pelvic    Pap   Covered every 2 years for women at normal risk;  Annually if at high risk -  No recommendations at this time    Chlamydia Annually if high risk -  No recommendations a

## 2021-06-16 NOTE — PROGRESS NOTES
AUDIOGRAM     Angelita Lopez was referred for testing by Teto Benito for bilateral tinnitus   2/27/1945  [id number]      Otoscopic inspection: right ear no cerumen; left ear no cerumen.        Tests/Procedures  Patient was tested via  standard insert winter
Quality 111:Pneumonia Vaccination Status For Older Adults: Pneumococcal Vaccination not Administered or Previously Received, Reason not Otherwise Specified
Quality 130: Documentation Of Current Medications In The Medical Record: Current Medications Documented
Detail Level: Detailed
Quality 110: Preventive Care And Screening: Influenza Immunization: Influenza immunization was not ordered or administered, reason not given

## 2021-08-04 ENCOUNTER — HOSPITAL ENCOUNTER (OUTPATIENT)
Dept: MAMMOGRAPHY | Facility: HOSPITAL | Age: 76
Discharge: HOME | End: 2021-08-04
Attending: INTERNAL MEDICINE
Payer: MEDICARE

## 2021-08-04 ENCOUNTER — HOSPITAL ENCOUNTER (OUTPATIENT)
Dept: ULTRASOUND IMAGING | Facility: HOSPITAL | Age: 76
Discharge: HOME | End: 2021-08-04
Attending: INTERNAL MEDICINE
Payer: MEDICARE

## 2021-08-04 ENCOUNTER — HOSPITAL ENCOUNTER (OUTPATIENT)
Dept: BONE DENSITY | Facility: HOSPITAL | Age: 76
Discharge: HOME | End: 2021-08-04
Attending: INTERNAL MEDICINE
Payer: MEDICARE

## 2021-08-04 DIAGNOSIS — R92.8 ABNORMAL MAMMOGRAM OF LEFT BREAST: ICD-10-CM

## 2021-08-04 DIAGNOSIS — Z00.00 ANNUAL PHYSICAL EXAM: ICD-10-CM

## 2021-08-04 PROCEDURE — 77065 DX MAMMO INCL CAD UNI: CPT | Performed by: INTERNAL MEDICINE

## 2021-08-04 PROCEDURE — 77080 DXA BONE DENSITY AXIAL: CPT | Performed by: INTERNAL MEDICINE

## 2021-08-04 PROCEDURE — 76642 ULTRASOUND BREAST LIMITED: CPT | Performed by: INTERNAL MEDICINE

## 2021-08-04 PROCEDURE — 77061 BREAST TOMOSYNTHESIS UNI: CPT | Performed by: INTERNAL MEDICINE

## 2021-08-25 ENCOUNTER — OFFICE VISIT (OUTPATIENT)
Dept: HEMATOLOGY/ONCOLOGY | Facility: HOSPITAL | Age: 76
End: 2021-08-25
Attending: INTERNAL MEDICINE
Payer: MEDICARE

## 2021-08-25 VITALS
BODY MASS INDEX: 28.98 KG/M2 | SYSTOLIC BLOOD PRESSURE: 108 MMHG | TEMPERATURE: 99 F | HEART RATE: 63 BPM | DIASTOLIC BLOOD PRESSURE: 61 MMHG | WEIGHT: 189 LBS | HEIGHT: 67.52 IN | OXYGEN SATURATION: 97 % | RESPIRATION RATE: 16 BRPM

## 2021-08-25 DIAGNOSIS — D05.11 DUCTAL CARCINOMA IN SITU (DCIS) OF RIGHT BREAST: Primary | ICD-10-CM

## 2021-08-25 DIAGNOSIS — R92.8 ABNORMAL MAMMOGRAM OF LEFT BREAST: ICD-10-CM

## 2021-08-25 PROCEDURE — 99213 OFFICE O/P EST LOW 20 MIN: CPT | Performed by: INTERNAL MEDICINE

## 2021-08-25 NOTE — PROGRESS NOTES
Cancer Center Progress Note    Patient Name: Dee Curry   YOB: 1945   Medical Record Number: [id number]   Attending Physician: Haroon Patel M.D.      Chief Complaint:  DCIS    History of Present Illness:  71-year-old female being seen for 03/2019   • COLONOSCOPY  2014    [facility]   • EGD N/A 11/17/2016    Procedure: ESOPHAGOGASTRODUODENOSCOPY (EGD);   Surgeon: Hloa Tyler MD;  Location: [address] [place] [address] ENDOSCOPY   • HEMORRHOIDECTOMY  1985   • KNEE ARTHROSCOPY Right 1997   • Delayed Release, Take 40 mg by mouth every morning.  , Disp: , Rfl:     aspirin 325 MG Oral Tab, Take 325 mg by mouth daily. , Disp: , Rfl:   atorvastatin 20 MG Oral Tab, Take 1 tablet (20 mg total) by mouth every evening., Disp: 90 tablet, Rfl: 1  Vitamin  06/10/2021    CO2 27.0 06/10/2021       Radiology:  Rachell left  CONCLUSION:         BI-RADS CATEGORY:     DIAGNOSTIC CATEGORY 3--PROBABLY BENIGN FINDING.         RECOMMENDATIONS:   SHORT TERM FOLLOW-UP DIAGNOSTIC MAMMOGRAM LEFT BREAST IN 6 MONTHS.

## 2021-09-01 ENCOUNTER — OFFICE VISIT (OUTPATIENT)
Dept: SURGERY | Facility: CLINIC | Age: 76
End: 2021-09-01
Payer: MEDICARE

## 2021-09-01 ENCOUNTER — TELEPHONE (OUTPATIENT)
Dept: SURGERY | Facility: CLINIC | Age: 76
End: 2021-09-01

## 2021-09-01 DIAGNOSIS — Z86.000 HISTORY OF DUCTAL CARCINOMA IN SITU (DCIS) OF BREAST: Primary | ICD-10-CM

## 2021-09-01 PROCEDURE — 99213 OFFICE O/P EST LOW 20 MIN: CPT | Performed by: SURGERY

## 2021-09-01 NOTE — PROGRESS NOTES
Established Patient Follow-up      9/1/2021    Elizabeth Mtz 67year old      HPI  Patient presents with: Follow - Up: S/p right breast mastectomy on 3/9/2021. Pt denies pain, states she just has a little tightness in area, she denies drainage, fevers.

## 2021-09-08 RX ORDER — ATORVASTATIN CALCIUM 20 MG/1
20 TABLET, FILM COATED ORAL EVERY EVENING
Qty: 90 TABLET | Refills: 1 | Status: SHIPPED | OUTPATIENT
Start: 2021-09-08

## 2021-09-08 NOTE — TELEPHONE ENCOUNTER
•  atorvastatin 20 MG Oral Tab, Take 1 tablet (20 mg total) by mouth every evening., Disp: 90 tablet, Rfl: 1

## 2021-09-08 NOTE — TELEPHONE ENCOUNTER
Refill passed per [place] [employer], [facility] protocol. Requested Prescriptions   Pending Prescriptions Disp Refills    atorvastatin 20 MG Oral Tab 90 tablet 1     Sig: Take 1 tablet (20 mg total) by mouth every evening.         Cholesterol Medication Protocol Passed

## 2021-10-05 ENCOUNTER — TELEPHONE (OUTPATIENT)
Dept: INTERNAL MEDICINE CLINIC | Facility: CLINIC | Age: 76
End: 2021-10-05

## 2021-10-05 NOTE — TELEPHONE ENCOUNTER
Spoke to patient. She has been experiencing increased epi-gastric heartburn type pain for the last week. She feels bloated too. She has been on Omeprazole for many years and does not feel it is helping anymore.  She asked to schedule an appointment with

## 2021-10-06 ENCOUNTER — OFFICE VISIT (OUTPATIENT)
Dept: INTERNAL MEDICINE CLINIC | Facility: CLINIC | Age: 76
End: 2021-10-06
Payer: MEDICARE

## 2021-10-06 ENCOUNTER — OFFICE VISIT (OUTPATIENT)
Dept: ORTHOPEDICS CLINIC | Facility: CLINIC | Age: 76
End: 2021-10-06
Payer: MEDICARE

## 2021-10-06 ENCOUNTER — HOSPITAL ENCOUNTER (OUTPATIENT)
Dept: GENERAL RADIOLOGY | Facility: HOSPITAL | Age: 76
Discharge: HOME | End: 2021-10-06
Attending: ORTHOPAEDIC SURGERY
Payer: MEDICARE

## 2021-10-06 VITALS
DIASTOLIC BLOOD PRESSURE: 67 MMHG | SYSTOLIC BLOOD PRESSURE: 122 MMHG | BODY MASS INDEX: 29.62 KG/M2 | HEIGHT: 67 IN | HEART RATE: 68 BPM | WEIGHT: 188.69 LBS

## 2021-10-06 VITALS — HEIGHT: 67.5 IN | BODY MASS INDEX: 29.32 KG/M2 | WEIGHT: 189 LBS

## 2021-10-06 DIAGNOSIS — Z47.89 ORTHOPEDIC AFTERCARE: ICD-10-CM

## 2021-10-06 DIAGNOSIS — R10.13 EPIGASTRIC PAIN: Primary | ICD-10-CM

## 2021-10-06 DIAGNOSIS — Z47.89 ORTHOPEDIC AFTERCARE: Primary | ICD-10-CM

## 2021-10-06 PROCEDURE — 99213 OFFICE O/P EST LOW 20 MIN: CPT | Performed by: INTERNAL MEDICINE

## 2021-10-06 PROCEDURE — 3078F DIAST BP <80 MM HG: CPT | Performed by: INTERNAL MEDICINE

## 2021-10-06 PROCEDURE — 99212 OFFICE O/P EST SF 10 MIN: CPT | Performed by: ORTHOPAEDIC SURGERY

## 2021-10-06 PROCEDURE — 3074F SYST BP LT 130 MM HG: CPT | Performed by: INTERNAL MEDICINE

## 2021-10-06 PROCEDURE — 3008F BODY MASS INDEX DOCD: CPT | Performed by: INTERNAL MEDICINE

## 2021-10-06 PROCEDURE — 3008F BODY MASS INDEX DOCD: CPT | Performed by: ORTHOPAEDIC SURGERY

## 2021-10-06 PROCEDURE — 73562 X-RAY EXAM OF KNEE 3: CPT | Performed by: ORTHOPAEDIC SURGERY

## 2021-10-06 RX ORDER — OMEPRAZOLE 20 MG/1
20 CAPSULE, DELAYED RELEASE ORAL
COMMUNITY

## 2021-10-06 NOTE — PROGRESS NOTES
Jad Lantigua is a 67year old female. Patient presents with:  Abdominal Pain    HPI:   For the past 1 week, she has had intermittent \"epigastric discomfort. \"  She describes episodes of dull epigastric pain and abdominal bloating, typically occurring a testing negative   • History of colon polyps    • Hypercholesterolemia    • Osteoarthritis of right knee     Status post right TKA 10-20   • Varicose veins of both lower extremities    • Visual impairment     readers     Past Surgical History:   Procedure epigastric tenderness to deep palpation. No guarding or rebound. No mass or hepatosplenomegaly. Adkins sign negative      ASSESSMENT AND PLAN:   1. Epigastric pain  Etiology not entirely clear.   Possibly secondary to recent change from enteric-coated as

## 2021-10-27 ENCOUNTER — TELEPHONE (OUTPATIENT)
Dept: INTERNAL MEDICINE CLINIC | Facility: CLINIC | Age: 76
End: 2021-10-27

## 2021-10-27 DIAGNOSIS — R10.13 EPIGASTRIC PAIN: Primary | ICD-10-CM

## 2021-10-27 NOTE — TELEPHONE ENCOUNTER
Patient was seen on 10/6/21 for epigastric pain and reports was advised to call back in a week if not feeling better. States pain is still intermittent and nothing has changed since the office visit following recommendations.  Patient requesting order for a

## 2021-11-22 ENCOUNTER — HOSPITAL ENCOUNTER (OUTPATIENT)
Dept: ULTRASOUND IMAGING | Facility: HOSPITAL | Age: 76
Discharge: HOME | End: 2021-11-22
Attending: INTERNAL MEDICINE
Payer: MEDICARE

## 2021-11-22 DIAGNOSIS — R10.13 EPIGASTRIC PAIN: ICD-10-CM

## 2021-11-22 PROCEDURE — 76705 ECHO EXAM OF ABDOMEN: CPT | Performed by: INTERNAL MEDICINE

## 2021-11-29 ENCOUNTER — NURSE TRIAGE (OUTPATIENT)
Dept: INTERNAL MEDICINE CLINIC | Facility: CLINIC | Age: 76
End: 2021-11-29

## 2021-11-29 DIAGNOSIS — M25.562 ACUTE PAIN OF LEFT KNEE: Primary | ICD-10-CM

## 2021-11-29 NOTE — TELEPHONE ENCOUNTER
Action Requested: Summary for Provider     []  Critical Lab, Recommendations Needed  [x] Need Additional Advice  []   FYI    []   Need Orders  [] Need Medications Sent to Pharmacy  []  Other     SUMMARY: Patient requesting recommendation if she should see

## 2021-11-29 NOTE — TELEPHONE ENCOUNTER
Pt was called and she stated that she had already made appt to see  tomorrow and she will discuss this with him tomorrow. Thanks      Future Appointments   Date Time Provider Brigid Rob   11/30/2021  1:30 PM Benito Burgess MD [facility]

## 2021-11-30 ENCOUNTER — OFFICE VISIT (OUTPATIENT)
Dept: INTERNAL MEDICINE CLINIC | Facility: CLINIC | Age: 76
End: 2021-11-30
Payer: MEDICARE

## 2021-11-30 VITALS
DIASTOLIC BLOOD PRESSURE: 63 MMHG | SYSTOLIC BLOOD PRESSURE: 115 MMHG | HEIGHT: 67 IN | WEIGHT: 189.63 LBS | BODY MASS INDEX: 29.76 KG/M2 | HEART RATE: 66 BPM

## 2021-11-30 DIAGNOSIS — M25.562 ACUTE PAIN OF LEFT KNEE: Primary | ICD-10-CM

## 2021-11-30 PROCEDURE — 99213 OFFICE O/P EST LOW 20 MIN: CPT | Performed by: INTERNAL MEDICINE

## 2021-11-30 PROCEDURE — 3008F BODY MASS INDEX DOCD: CPT | Performed by: INTERNAL MEDICINE

## 2021-11-30 PROCEDURE — 3074F SYST BP LT 130 MM HG: CPT | Performed by: INTERNAL MEDICINE

## 2021-11-30 PROCEDURE — 3078F DIAST BP <80 MM HG: CPT | Performed by: INTERNAL MEDICINE

## 2021-11-30 RX ORDER — NAPROXEN 500 MG/1
500 TABLET ORAL 2 TIMES DAILY WITH MEALS
Qty: 30 TABLET | Refills: 0 | Status: SHIPPED | OUTPATIENT
Start: 2021-11-30 | End: 2022-11-25

## 2021-11-30 RX ORDER — ASPIRIN 81 MG/1
81 TABLET ORAL DAILY
COMMUNITY

## 2021-11-30 NOTE — PROGRESS NOTES
Crystal Mariano is a 67year old female. Patient presents with:  Knee Pain: left knee    HPI:   About 7-10 days ago, she was sitting in her chair when she stood to get up. She did not fall or twist her knee but felt that her left knee popped.   Since that right TKA 10-20   • Varicose veins of both lower extremities    • Visual impairment     readers     Past Surgical History:   Procedure Laterality Date   • BIOPSY OF BREAST, NEEDLE CORE Left 01/24/2008    Fibroadenoma   • BIOPSY OF BREAST, NEEDLE CORE Right mg twice daily with food. Prescription sent to pharmacy. Keep scheduled Orthopedics appointment. The patient indicates understanding of these issues and agrees to the plan. The patient is asked to return as needed.     Awa Apodaca MD  11/30/2021

## 2021-11-30 NOTE — PATIENT INSTRUCTIONS
Please rest and apply heat to your left knee 2-3 times daily. Please take naproxen 500 mg 1 tablet twice daily with food. Please keep your scheduled appointment with Orthopedics.

## 2021-12-08 ENCOUNTER — OFFICE VISIT (OUTPATIENT)
Dept: ORTHOPEDICS CLINIC | Facility: CLINIC | Age: 76
End: 2021-12-08
Payer: MEDICARE

## 2021-12-08 ENCOUNTER — HOSPITAL ENCOUNTER (OUTPATIENT)
Dept: GENERAL RADIOLOGY | Facility: HOSPITAL | Age: 76
Discharge: HOME | End: 2021-12-08
Attending: ORTHOPAEDIC SURGERY
Payer: MEDICARE

## 2021-12-08 VITALS — SYSTOLIC BLOOD PRESSURE: 130 MMHG | HEART RATE: 78 BPM | DIASTOLIC BLOOD PRESSURE: 64 MMHG

## 2021-12-08 DIAGNOSIS — M25.562 LEFT KNEE PAIN, UNSPECIFIED CHRONICITY: Primary | ICD-10-CM

## 2021-12-08 DIAGNOSIS — M25.562 LEFT KNEE PAIN, UNSPECIFIED CHRONICITY: ICD-10-CM

## 2021-12-08 PROCEDURE — 20610 DRAIN/INJ JOINT/BURSA W/O US: CPT | Performed by: PHYSICIAN ASSISTANT

## 2021-12-08 PROCEDURE — 3075F SYST BP GE 130 - 139MM HG: CPT | Performed by: PHYSICIAN ASSISTANT

## 2021-12-08 PROCEDURE — 73564 X-RAY EXAM KNEE 4 OR MORE: CPT | Performed by: ORTHOPAEDIC SURGERY

## 2021-12-08 PROCEDURE — 3078F DIAST BP <80 MM HG: CPT | Performed by: PHYSICIAN ASSISTANT

## 2021-12-08 RX ORDER — TRIAMCINOLONE ACETONIDE 40 MG/ML
40 INJECTION, SUSPENSION INTRA-ARTICULAR; INTRAMUSCULAR ONCE
Status: COMPLETED | OUTPATIENT
Start: 2021-12-08 | End: 2021-12-08

## 2021-12-08 NOTE — PROGRESS NOTES
Per verbal order draw up 4ml of 0.5% Marcaine and 1ml of Kenalog 40 for cortisone injection. Patient provided education handout for cortisone injection.

## 2021-12-08 NOTE — PROGRESS NOTES
NURSING INTAKE COMMENTS: Patient presents with:  Knee Pain: New pt, had a fall from chair and landed on left knee x 2 weeks ago. denies any numbness or tingling. Rates pain 7/10      HPI: This 67year old female presents today with complaints of knee pain. [facility]   • EGD N/A 11/17/2016    Procedure: ESOPHAGOGASTRODUODENOSCOPY (EGD);   Surgeon: Jo Suggs MD;  Location: [address] [place] [address] ENDOSCOPY   • HEMORRHOIDECTOMY  1985   • KNEE ARTHROSCOPY Right 1997   • MASTECTOMY,SIMPLE Right 03/09/2021   • S significant weight loss or weight gain  SKIN: no ulcerated or worrisome skin lesions  EYES:denies blurred vision or double vision  HEENT: denies new nasal congestion, sinus pain or ST  LUNGS: denies shortness of breath  CARDIOVASCULAR: denies chest pain  G in the knee. Believe that her arthritis was aggravated with the injury. We discussed options for treatment. I aspirated 35 cc of clear yellow synovial fluid and injected the knee with local anesthetic and cortisone.   She noticed immediate improvement in

## 2022-01-01 NOTE — TELEPHONE ENCOUNTER
Patient is requesting a referral to see Dr. Chirag Medina. [Immunizations are up to date] : Immunizations are up to date [Nl] : Allergy/Immunology [Synagis Injection] : no synagis injection [FreeTextEntry1] : Synagis  candidate- Fall 2022   Either [facility]  or  PMD will order it

## 2022-01-11 ENCOUNTER — TELEPHONE (OUTPATIENT)
Dept: CASE MANAGEMENT | Age: 77
End: 2022-01-11

## 2022-01-11 NOTE — TELEPHONE ENCOUNTER
Dr. Naun Marino,  Patient called the referral department requesting an order for a 6 month follow up mammogram.  Please call patient when order has been placed. Thank you.   Referral Department

## 2022-02-16 ENCOUNTER — HOSPITAL ENCOUNTER (OUTPATIENT)
Dept: MAMMOGRAPHY | Facility: HOSPITAL | Age: 77
Discharge: HOME | End: 2022-02-16
Attending: INTERNAL MEDICINE
Payer: MEDICARE

## 2022-02-16 ENCOUNTER — HOSPITAL ENCOUNTER (OUTPATIENT)
Dept: ULTRASOUND IMAGING | Facility: HOSPITAL | Age: 77
Discharge: HOME | End: 2022-02-16
Attending: INTERNAL MEDICINE
Payer: MEDICARE

## 2022-02-16 DIAGNOSIS — R92.8 ABNORMAL MAMMOGRAM OF LEFT BREAST: ICD-10-CM

## 2022-02-16 PROCEDURE — 76642 ULTRASOUND BREAST LIMITED: CPT | Performed by: INTERNAL MEDICINE

## 2022-02-16 PROCEDURE — 77065 DX MAMMO INCL CAD UNI: CPT | Performed by: INTERNAL MEDICINE

## 2022-02-16 PROCEDURE — 77061 BREAST TOMOSYNTHESIS UNI: CPT | Performed by: INTERNAL MEDICINE

## 2022-03-02 ENCOUNTER — OFFICE VISIT (OUTPATIENT)
Dept: HEMATOLOGY/ONCOLOGY | Facility: HOSPITAL | Age: 77
End: 2022-03-02
Attending: INTERNAL MEDICINE
Payer: MEDICARE

## 2022-03-02 VITALS
OXYGEN SATURATION: 98 % | SYSTOLIC BLOOD PRESSURE: 136 MMHG | TEMPERATURE: 98 F | WEIGHT: 189 LBS | DIASTOLIC BLOOD PRESSURE: 73 MMHG | HEART RATE: 68 BPM | RESPIRATION RATE: 16 BRPM | HEIGHT: 67 IN | BODY MASS INDEX: 29.66 KG/M2

## 2022-03-02 DIAGNOSIS — Z12.31 ENCOUNTER FOR SCREENING MAMMOGRAM FOR MALIGNANT NEOPLASM OF BREAST: ICD-10-CM

## 2022-03-02 DIAGNOSIS — R92.8 ABNORMAL MAMMOGRAM OF LEFT BREAST: ICD-10-CM

## 2022-03-02 DIAGNOSIS — D05.11 DUCTAL CARCINOMA IN SITU (DCIS) OF RIGHT BREAST: Primary | ICD-10-CM

## 2022-03-02 PROCEDURE — 99214 OFFICE O/P EST MOD 30 MIN: CPT | Performed by: INTERNAL MEDICINE

## 2022-03-09 ENCOUNTER — TELEPHONE (OUTPATIENT)
Dept: CASE MANAGEMENT | Age: 77
End: 2022-03-09

## 2022-03-09 NOTE — TELEPHONE ENCOUNTER
Dr. Yee Bain,  The patient called requesting referral to   Dr. Taylor Vazquez referral please review diagnosis and sign off if you agree. Thank you.   Bakari Palm

## 2022-03-14 ENCOUNTER — TELEPHONE (OUTPATIENT)
Dept: INTERNAL MEDICINE CLINIC | Facility: CLINIC | Age: 77
End: 2022-03-14

## 2022-03-14 NOTE — TELEPHONE ENCOUNTER
Patient is requesting lab orders. 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

## 2022-03-16 ENCOUNTER — TELEPHONE (OUTPATIENT)
Dept: CASE MANAGEMENT | Age: 77
End: 2022-03-16

## 2022-03-16 NOTE — TELEPHONE ENCOUNTER
Dr. Dena Serrato,    The patient called requesting referral to  Dr. Steve Tierney for follow up visit 3/21/22. Pended referral please review diagnosis and sign off if you agree. Thank you.   Bakari Palm

## 2022-03-21 ENCOUNTER — OFFICE VISIT (OUTPATIENT)
Dept: SURGERY | Facility: CLINIC | Age: 77
End: 2022-03-21
Payer: MEDICARE

## 2022-03-21 VITALS — BODY MASS INDEX: 30 KG/M2 | WEIGHT: 189 LBS

## 2022-03-21 DIAGNOSIS — Z86.000 HISTORY OF DUCTAL CARCINOMA IN SITU (DCIS) OF BREAST: Primary | ICD-10-CM

## 2022-03-21 DIAGNOSIS — N63.25 MASS OVERLAPPING MULTIPLE QUADRANTS OF LEFT BREAST: ICD-10-CM

## 2022-03-21 PROCEDURE — 99213 OFFICE O/P EST LOW 20 MIN: CPT | Performed by: SURGERY

## 2022-04-01 ENCOUNTER — MED REC SCAN ONLY (OUTPATIENT)
Dept: INTERNAL MEDICINE CLINIC | Facility: CLINIC | Age: 77
End: 2022-04-01

## 2022-06-20 ENCOUNTER — LAB ENCOUNTER (OUTPATIENT)
Dept: LAB | Facility: HOSPITAL | Age: 77
End: 2022-06-20
Attending: INTERNAL MEDICINE
Payer: MEDICARE

## 2022-06-20 DIAGNOSIS — Z00.00 ANNUAL PHYSICAL EXAM: ICD-10-CM

## 2022-06-20 LAB
ALBUMIN SERPL-MCNC: 3.7 G/DL (ref 3.4–5)
ALBUMIN/GLOB SERPL: 1 {RATIO} (ref 1–2)
ALP LIVER SERPL-CCNC: 63 U/L
ALT SERPL-CCNC: 25 U/L
ANION GAP SERPL CALC-SCNC: 5 MMOL/L (ref 0–18)
AST SERPL-CCNC: 26 U/L (ref 15–37)
BILIRUB SERPL-MCNC: 0.7 MG/DL (ref 0.1–2)
BUN BLD-MCNC: 18 MG/DL (ref 7–18)
BUN/CREAT SERPL: 13.5 (ref 10–20)
CALCIUM BLD-MCNC: 9.1 MG/DL (ref 8.5–10.1)
CHLORIDE SERPL-SCNC: 108 MMOL/L (ref 98–112)
CHOLEST SERPL-MCNC: 169 MG/DL (ref ?–200)
CO2 SERPL-SCNC: 28 MMOL/L (ref 21–32)
CREAT BLD-MCNC: 1.33 MG/DL
DEPRECATED RDW RBC AUTO: 44.3 FL (ref 35.1–46.3)
ERYTHROCYTE [DISTWIDTH] IN BLOOD BY AUTOMATED COUNT: 13.1 % (ref 11–15)
FASTING PATIENT LIPID ANSWER: YES
FASTING STATUS PATIENT QL REPORTED: YES
GLOBULIN PLAS-MCNC: 3.7 G/DL (ref 2.8–4.4)
GLUCOSE BLD-MCNC: 98 MG/DL (ref 70–99)
HCT VFR BLD AUTO: 40 %
HDLC SERPL-MCNC: 64 MG/DL (ref 40–59)
HGB BLD-MCNC: 12.9 G/DL
LDLC SERPL CALC-MCNC: 90 MG/DL (ref ?–100)
MCH RBC QN AUTO: 29.9 PG (ref 26–34)
MCHC RBC AUTO-ENTMCNC: 32.3 G/DL (ref 31–37)
MCV RBC AUTO: 92.6 FL
NONHDLC SERPL-MCNC: 105 MG/DL (ref ?–130)
OSMOLALITY SERPL CALC.SUM OF ELEC: 294 MOSM/KG (ref 275–295)
PLATELET # BLD AUTO: 220 10(3)UL (ref 150–450)
POTASSIUM SERPL-SCNC: 4.6 MMOL/L (ref 3.5–5.1)
PROT SERPL-MCNC: 7.4 G/DL (ref 6.4–8.2)
RBC # BLD AUTO: 4.32 X10(6)UL
SODIUM SERPL-SCNC: 141 MMOL/L (ref 136–145)
TRIGL SERPL-MCNC: 78 MG/DL (ref 30–149)
VLDLC SERPL CALC-MCNC: 13 MG/DL (ref 0–30)
WBC # BLD AUTO: 3.2 X10(3) UL (ref 4–11)

## 2022-06-20 PROCEDURE — 85027 COMPLETE CBC AUTOMATED: CPT

## 2022-06-20 PROCEDURE — 36415 COLL VENOUS BLD VENIPUNCTURE: CPT

## 2022-06-20 PROCEDURE — 80061 LIPID PANEL: CPT

## 2022-06-20 PROCEDURE — 80053 COMPREHEN METABOLIC PANEL: CPT

## 2022-06-22 ENCOUNTER — OFFICE VISIT (OUTPATIENT)
Dept: INTERNAL MEDICINE CLINIC | Facility: CLINIC | Age: 77
End: 2022-06-22
Payer: MEDICARE

## 2022-06-22 VITALS
WEIGHT: 189.13 LBS | HEART RATE: 59 BPM | DIASTOLIC BLOOD PRESSURE: 69 MMHG | SYSTOLIC BLOOD PRESSURE: 123 MMHG | HEIGHT: 67 IN | BODY MASS INDEX: 29.69 KG/M2

## 2022-06-22 DIAGNOSIS — Z00.00 ENCOUNTER FOR ANNUAL HEALTH EXAMINATION: ICD-10-CM

## 2022-06-22 DIAGNOSIS — Z00.00 ANNUAL PHYSICAL EXAM: Primary | ICD-10-CM

## 2022-06-22 DIAGNOSIS — E78.00 HYPERCHOLESTEROLEMIA: ICD-10-CM

## 2022-06-22 DIAGNOSIS — Z86.718 HISTORY OF DVT (DEEP VEIN THROMBOSIS): ICD-10-CM

## 2022-06-22 DIAGNOSIS — Z86.010 HISTORY OF COLON POLYPS: ICD-10-CM

## 2022-06-22 DIAGNOSIS — N18.30 STAGE 3 CHRONIC KIDNEY DISEASE, UNSPECIFIED WHETHER STAGE 3A OR 3B CKD (HCC): ICD-10-CM

## 2022-06-22 DIAGNOSIS — Z85.3 HISTORY OF BREAST CANCER: ICD-10-CM

## 2022-06-22 DIAGNOSIS — I70.0 AORTIC ATHEROSCLEROSIS (HCC): ICD-10-CM

## 2022-06-22 DIAGNOSIS — K21.9 GASTROESOPHAGEAL REFLUX DISEASE WITHOUT ESOPHAGITIS: ICD-10-CM

## 2022-06-22 DIAGNOSIS — E04.2 MULTINODULAR GOITER: ICD-10-CM

## 2022-06-22 PROCEDURE — 3008F BODY MASS INDEX DOCD: CPT | Performed by: INTERNAL MEDICINE

## 2022-06-22 PROCEDURE — 99397 PER PM REEVAL EST PAT 65+ YR: CPT | Performed by: INTERNAL MEDICINE

## 2022-06-22 PROCEDURE — 96160 PT-FOCUSED HLTH RISK ASSMT: CPT | Performed by: INTERNAL MEDICINE

## 2022-06-22 PROCEDURE — 3074F SYST BP LT 130 MM HG: CPT | Performed by: INTERNAL MEDICINE

## 2022-06-22 PROCEDURE — 1126F AMNT PAIN NOTED NONE PRSNT: CPT | Performed by: INTERNAL MEDICINE

## 2022-06-22 PROCEDURE — 3078F DIAST BP <80 MM HG: CPT | Performed by: INTERNAL MEDICINE

## 2022-06-22 PROCEDURE — G0439 PPPS, SUBSEQ VISIT: HCPCS | Performed by: INTERNAL MEDICINE

## 2022-08-15 RX ORDER — ATORVASTATIN CALCIUM 20 MG/1
20 TABLET, FILM COATED ORAL EVERY EVENING
Qty: 90 TABLET | Refills: 3 | Status: SHIPPED | OUTPATIENT
Start: 2022-08-15

## 2022-10-11 ENCOUNTER — TELEPHONE (OUTPATIENT)
Dept: INTERNAL MEDICINE CLINIC | Facility: CLINIC | Age: 77
End: 2022-10-11

## 2022-10-11 NOTE — TELEPHONE ENCOUNTER
Form left on  for completion. Explained to patient that Margarita Jenkins is out of the office for the rest of the week but when form is completed we will give her a call. Patient verbalized understanding.

## 2023-01-24 ENCOUNTER — TELEPHONE (OUTPATIENT)
Dept: CASE MANAGEMENT | Age: 78
End: 2023-01-24

## 2023-01-24 DIAGNOSIS — Z12.31 BREAST CANCER SCREENING BY MAMMOGRAM: Primary | ICD-10-CM

## 2023-01-24 NOTE — TELEPHONE ENCOUNTER
Dr. Lisa Meadows,     Patient is requesting an order for a mammogram from her PCP. Please call patient when order has been placed and patient has insurance questions. Thank you.

## 2023-02-16 ENCOUNTER — TELEPHONE (OUTPATIENT)
Dept: INTERNAL MEDICINE CLINIC | Facility: CLINIC | Age: 78
End: 2023-02-16

## 2023-02-16 ENCOUNTER — TELEPHONE (OUTPATIENT)
Dept: CASE MANAGEMENT | Age: 78
End: 2023-02-16

## 2023-02-16 DIAGNOSIS — Z01.00 ENCOUNTER FOR COMPLETE EYE EXAM: Primary | ICD-10-CM

## 2023-02-16 NOTE — TELEPHONE ENCOUNTER
Dr. Tara Méndez,     Patient requesting referral for eye exam with   Dr. Breezy Stockton referral please review diagnosis and sign off if you agree. Thank you.   Bakari Palm

## 2023-02-16 NOTE — TELEPHONE ENCOUNTER
Reached patient for medication adherence consult. Patient failed adherence measure for atorvastatin for 2022 per insurance report. Patient reports that she is taking her medication as prescribed without missing doses. Patient reports that she is tolerating her medication well. Patient reports that she still has some medication left and does not need a refill at this time. Provided education on importance of adherence for optimal benefit. Patient denies any questions or concerns with medication.

## 2023-02-22 ENCOUNTER — OFFICE VISIT (OUTPATIENT)
Facility: CLINIC | Age: 78
End: 2023-02-22

## 2023-02-22 ENCOUNTER — TELEPHONE (OUTPATIENT)
Facility: CLINIC | Age: 78
End: 2023-02-22

## 2023-02-22 VITALS
WEIGHT: 197 LBS | SYSTOLIC BLOOD PRESSURE: 123 MMHG | DIASTOLIC BLOOD PRESSURE: 77 MMHG | HEART RATE: 55 BPM | HEIGHT: 67 IN | BODY MASS INDEX: 30.92 KG/M2

## 2023-02-22 DIAGNOSIS — Z12.11 COLON CANCER SCREENING: Primary | ICD-10-CM

## 2023-02-22 DIAGNOSIS — Z86.010 HX OF COLONIC POLYPS: Primary | ICD-10-CM

## 2023-02-22 DIAGNOSIS — Z86.010 PERSONAL HISTORY OF COLONIC POLYPS: Primary | ICD-10-CM

## 2023-02-22 PROCEDURE — 3074F SYST BP LT 130 MM HG: CPT | Performed by: STUDENT IN AN ORGANIZED HEALTH CARE EDUCATION/TRAINING PROGRAM

## 2023-02-22 PROCEDURE — 99203 OFFICE O/P NEW LOW 30 MIN: CPT | Performed by: STUDENT IN AN ORGANIZED HEALTH CARE EDUCATION/TRAINING PROGRAM

## 2023-02-22 PROCEDURE — 1126F AMNT PAIN NOTED NONE PRSNT: CPT | Performed by: STUDENT IN AN ORGANIZED HEALTH CARE EDUCATION/TRAINING PROGRAM

## 2023-02-22 PROCEDURE — 3078F DIAST BP <80 MM HG: CPT | Performed by: STUDENT IN AN ORGANIZED HEALTH CARE EDUCATION/TRAINING PROGRAM

## 2023-02-22 PROCEDURE — 3008F BODY MASS INDEX DOCD: CPT | Performed by: STUDENT IN AN ORGANIZED HEALTH CARE EDUCATION/TRAINING PROGRAM

## 2023-02-22 RX ORDER — SODIUM, POTASSIUM,MAG SULFATES 17.5-3.13G
SOLUTION, RECONSTITUTED, ORAL ORAL
Qty: 1 EACH | Refills: 0 | Status: SHIPPED | OUTPATIENT
Start: 2023-02-22 | End: 2023-02-22

## 2023-02-22 RX ORDER — SODIUM PICOSULFATE, MAGNESIUM OXIDE, AND ANHYDROUS CITRIC ACID 10; 3.5; 12 MG/160ML; G/160ML; G/160ML
1 LIQUID ORAL ONCE
Qty: 1 EACH | Refills: 0 | Status: CANCELLED | OUTPATIENT
Start: 2023-02-22 | End: 2023-02-22

## 2023-02-22 RX ORDER — SODIUM PICOSULFATE, MAGNESIUM OXIDE, AND ANHYDROUS CITRIC ACID 10; 3.5; 12 MG/160ML; G/160ML; G/160ML
2 LIQUID ORAL AS DIRECTED
Qty: 1 EACH | Refills: 0 | Status: SHIPPED | OUTPATIENT
Start: 2023-02-22 | End: 2023-02-24

## 2023-02-22 NOTE — PATIENT INSTRUCTIONS
1. Schedule colonoscopy with MAC [Diagnosis: history of colon polyps]    2.  bowel prep from pharmacy (split dose suprep)    3. Continue all medications for procedure    4. Read all bowel prep instructions carefully    5. AVOID seeds, nuts, popcorn, raw fruits and vegetables (cooked is okay) for 2-3 days before procedure    6. You MAY need to go for COVID testing 72 hours before procedure. The testing team will call you a few days before your procedure to discuss with you if testing is required. If you are asked to go for COVID testing and do not completed the test, the procedure cannot be performed. 7. If you start any NEW medication after your visit today, please notify us. Certain medications will need to be held before the procedure, or the procedure cannot be performed.

## 2023-02-22 NOTE — TELEPHONE ENCOUNTER
Scheduled for:  Colonoscopy [id number]  Provider Name:  Dr Deya Owens  Date:  06/09/2023  Location:  [facility]   Sedation:  MAC  Time: 11:15am (Patient is aware arrival time is at 10:15am)  Prep:  Trilyte Prep Instructions Given At The Office Visit. Meds/Allergies Reconciled?:  Physician Reviewed  Diagnosis with codes:  Hx of Colon Polyps Z86.010  Was patient informed to call insurance with codes (Y/N):  Yes  Referral sent?:  Referral was sent at the time of electronic surgical scheduling. [address] [place] [address] or [facility] notified?:  I sent an electronic request to Endo Scheduling and received a confirmation today. Medication Orders:  Pt is aware to NOT take iron pills, herbal meds and diet supplements for 7 days before exam. Also to NOT take any form of alcohol, recreational drugs and any forms of ED meds 24 hours before exam.   Misc Orders:       Further instructions given by staff:  I provide prep instructions to patient at the time of the appointment and reviewed date, time and location, she verbalized that she understood and is aware to call if she has any questions.

## 2023-02-23 RX ORDER — SODIUM PICOSULFATE, MAGNESIUM OXIDE, AND ANHYDROUS CITRIC ACID 10; 3.5; 12 MG/160ML; G/160ML; G/160ML
LIQUID ORAL
Qty: 320 ML | Refills: 0 | Status: CANCELLED | OUTPATIENT
Start: 2023-02-23 | End: 2023-02-25

## 2023-02-23 NOTE — TELEPHONE ENCOUNTER
Received a call from the patient,  verified. The patient cannot decide which bowel prep to take. She will call back to let us know.

## 2023-02-23 NOTE — TELEPHONE ENCOUNTER
I called [facility]. They do not have the Clenpiq prescription. They told me the cost would be $47.00    The PEG 3350 is no charge    I tried to call the pt to see which bowel prep she prefers. She did not answer the cell phone so I left her a message to call me back.  No voice mail on the home phone

## 2023-03-07 ENCOUNTER — HOSPITAL ENCOUNTER (OUTPATIENT)
Dept: MAMMOGRAPHY | Facility: HOSPITAL | Age: 78
Discharge: HOME | End: 2023-03-07
Attending: INTERNAL MEDICINE
Payer: MEDICARE

## 2023-03-07 DIAGNOSIS — Z12.31 BREAST CANCER SCREENING BY MAMMOGRAM: ICD-10-CM

## 2023-03-07 PROCEDURE — 77067 SCR MAMMO BI INCL CAD: CPT | Performed by: INTERNAL MEDICINE

## 2023-03-07 PROCEDURE — 77063 BREAST TOMOSYNTHESIS BI: CPT | Performed by: INTERNAL MEDICINE

## 2023-03-08 ENCOUNTER — OFFICE VISIT (OUTPATIENT)
Dept: HEMATOLOGY/ONCOLOGY | Facility: HOSPITAL | Age: 78
End: 2023-03-08
Attending: INTERNAL MEDICINE
Payer: MEDICARE

## 2023-03-08 VITALS
DIASTOLIC BLOOD PRESSURE: 59 MMHG | BODY MASS INDEX: 29.98 KG/M2 | RESPIRATION RATE: 18 BRPM | TEMPERATURE: 98 F | SYSTOLIC BLOOD PRESSURE: 117 MMHG | OXYGEN SATURATION: 98 % | HEIGHT: 67 IN | WEIGHT: 191 LBS | HEART RATE: 56 BPM

## 2023-03-08 DIAGNOSIS — Z86.000 HISTORY OF DUCTAL CARCINOMA IN SITU (DCIS) OF BREAST: Primary | ICD-10-CM

## 2023-03-08 DIAGNOSIS — Z90.11 H/O RIGHT MASTECTOMY: ICD-10-CM

## 2023-03-08 DIAGNOSIS — R92.8 ABNORMAL MAMMOGRAM OF LEFT BREAST: ICD-10-CM

## 2023-03-08 PROCEDURE — 99214 OFFICE O/P EST MOD 30 MIN: CPT | Performed by: INTERNAL MEDICINE

## 2023-03-09 ENCOUNTER — TELEPHONE (OUTPATIENT)
Dept: HEMATOLOGY/ONCOLOGY | Facility: HOSPITAL | Age: 78
End: 2023-03-09

## 2023-03-09 NOTE — TELEPHONE ENCOUNTER
Patient advised her mammogram results are in, have been reviewed and results are stable findings with recommendations for annual screening for next year. No further f/u with oncology as discussed at her appointment which she expresses happiness and relief. She thanked Dr Alexis Chavarria for his care and annual follow up will be continued with her PCP Dr Simon Rankin.

## 2023-04-07 ENCOUNTER — MED REC SCAN ONLY (OUTPATIENT)
Dept: INTERNAL MEDICINE CLINIC | Facility: CLINIC | Age: 78
End: 2023-04-07

## 2023-05-10 ENCOUNTER — APPOINTMENT (OUTPATIENT)
Dept: ULTRASOUND IMAGING | Facility: HOSPITAL | Age: 78
End: 2023-05-10
Payer: MEDICARE

## 2023-05-10 ENCOUNTER — HOSPITAL ENCOUNTER (EMERGENCY)
Facility: HOSPITAL | Age: 78
Discharge: HOME | End: 2023-05-10
Attending: EMERGENCY MEDICINE
Payer: MEDICARE

## 2023-05-10 VITALS
TEMPERATURE: 97 F | BODY MASS INDEX: 29.35 KG/M2 | RESPIRATION RATE: 14 BRPM | HEART RATE: 56 BPM | HEIGHT: 67 IN | SYSTOLIC BLOOD PRESSURE: 124 MMHG | DIASTOLIC BLOOD PRESSURE: 70 MMHG | OXYGEN SATURATION: 99 % | WEIGHT: 187 LBS

## 2023-05-10 DIAGNOSIS — M79.651 ACUTE PAIN OF RIGHT THIGH: Primary | ICD-10-CM

## 2023-05-10 PROCEDURE — 93971 EXTREMITY STUDY: CPT | Performed by: EMERGENCY MEDICINE

## 2023-05-10 PROCEDURE — 99284 EMERGENCY DEPT VISIT MOD MDM: CPT

## 2023-05-10 RX ORDER — IBUPROFEN 600 MG/1
600 TABLET ORAL ONCE
Status: COMPLETED | OUTPATIENT
Start: 2023-05-10 | End: 2023-05-10

## 2023-05-10 RX ORDER — CYCLOBENZAPRINE HCL 10 MG
5 TABLET ORAL 3 TIMES DAILY PRN
Qty: 20 TABLET | Refills: 0 | Status: SHIPPED | OUTPATIENT
Start: 2023-05-10 | End: 2023-05-17

## 2023-05-10 NOTE — ED INITIAL ASSESSMENT (HPI)
Pt ambulatory to ED A&O x 4 w/ c/o: R thigh pain that started on Sunday. Denies any injury or trauma to area. Hx of R knee replacement approx 3 years ago. Hx of R leg DVT in 2005. Denies any numbness/tingling, chest pain, shortness of breath.

## 2023-05-12 ENCOUNTER — PATIENT OUTREACH (OUTPATIENT)
Dept: CASE MANAGEMENT | Age: 78
End: 2023-05-12

## 2023-05-12 NOTE — PROGRESS NOTES
1st attempt ED hfu apt request    Shannon Bailey  PCP  [address]  2nd floor  [place]   [phone]  Apt: [address] @9am     Confirmed w/ pt  Closing encounter

## 2023-05-17 ENCOUNTER — OFFICE VISIT (OUTPATIENT)
Dept: INTERNAL MEDICINE CLINIC | Facility: CLINIC | Age: 78
End: 2023-05-17

## 2023-05-17 VITALS
BODY MASS INDEX: 30.24 KG/M2 | DIASTOLIC BLOOD PRESSURE: 75 MMHG | HEART RATE: 78 BPM | WEIGHT: 192.63 LBS | HEIGHT: 67 IN | SYSTOLIC BLOOD PRESSURE: 122 MMHG

## 2023-05-17 DIAGNOSIS — M79.651 RIGHT THIGH PAIN: Primary | ICD-10-CM

## 2023-05-17 DIAGNOSIS — L84 CORNS OF MULTIPLE TOES: ICD-10-CM

## 2023-05-17 DIAGNOSIS — Z00.00 ANNUAL PHYSICAL EXAM: ICD-10-CM

## 2023-05-17 PROCEDURE — 99214 OFFICE O/P EST MOD 30 MIN: CPT | Performed by: INTERNAL MEDICINE

## 2023-05-17 PROCEDURE — 1159F MED LIST DOCD IN RCRD: CPT | Performed by: INTERNAL MEDICINE

## 2023-05-17 PROCEDURE — 1111F DSCHRG MED/CURRENT MED MERGE: CPT | Performed by: INTERNAL MEDICINE

## 2023-05-17 PROCEDURE — 3078F DIAST BP <80 MM HG: CPT | Performed by: INTERNAL MEDICINE

## 2023-05-17 PROCEDURE — 3008F BODY MASS INDEX DOCD: CPT | Performed by: INTERNAL MEDICINE

## 2023-05-17 PROCEDURE — 1125F AMNT PAIN NOTED PAIN PRSNT: CPT | Performed by: INTERNAL MEDICINE

## 2023-05-17 PROCEDURE — 1160F RVW MEDS BY RX/DR IN RCRD: CPT | Performed by: INTERNAL MEDICINE

## 2023-05-17 PROCEDURE — 3074F SYST BP LT 130 MM HG: CPT | Performed by: INTERNAL MEDICINE

## 2023-05-17 NOTE — PATIENT INSTRUCTIONS
Please continue to rest and apply heat to your right leg and take ibuprofen as needed. Call if no better. Please schedule an appointment with Podiatry. I will see you at your physical in June. Labs have been ordered.

## 2023-05-22 ENCOUNTER — TELEPHONE (OUTPATIENT)
Facility: CLINIC | Age: 78
End: 2023-05-22

## 2023-05-22 DIAGNOSIS — Z86.010 HX OF COLONIC POLYPS: Primary | ICD-10-CM

## 2023-06-24 ENCOUNTER — LAB ENCOUNTER (OUTPATIENT)
Dept: LAB | Facility: HOSPITAL | Age: 78
End: 2023-06-24
Attending: INTERNAL MEDICINE
Payer: MEDICARE

## 2023-06-24 DIAGNOSIS — Z00.00 ANNUAL PHYSICAL EXAM: ICD-10-CM

## 2023-06-24 LAB
ALBUMIN SERPL-MCNC: 3.5 G/DL (ref 3.4–5)
ALBUMIN/GLOB SERPL: 1 {RATIO} (ref 1–2)
ALP LIVER SERPL-CCNC: 54 U/L
ALT SERPL-CCNC: 23 U/L
ANION GAP SERPL CALC-SCNC: 5 MMOL/L (ref 0–18)
AST SERPL-CCNC: 19 U/L (ref 15–37)
BILIRUB SERPL-MCNC: 0.8 MG/DL (ref 0.1–2)
BUN BLD-MCNC: 14 MG/DL (ref 7–18)
BUN/CREAT SERPL: 11.8 (ref 10–20)
CALCIUM BLD-MCNC: 8.6 MG/DL (ref 8.5–10.1)
CHLORIDE SERPL-SCNC: 112 MMOL/L (ref 98–112)
CHOLEST SERPL-MCNC: 165 MG/DL (ref ?–200)
CO2 SERPL-SCNC: 26 MMOL/L (ref 21–32)
CREAT BLD-MCNC: 1.19 MG/DL
DEPRECATED RDW RBC AUTO: 43.8 FL (ref 35.1–46.3)
ERYTHROCYTE [DISTWIDTH] IN BLOOD BY AUTOMATED COUNT: 13.2 % (ref 11–15)
FASTING PATIENT LIPID ANSWER: YES
FASTING STATUS PATIENT QL REPORTED: YES
GFR SERPLBLD BASED ON 1.73 SQ M-ARVRAT: 47 ML/MIN/1.73M2 (ref 60–?)
GLOBULIN PLAS-MCNC: 3.6 G/DL (ref 2.8–4.4)
GLUCOSE BLD-MCNC: 117 MG/DL (ref 70–99)
HCT VFR BLD AUTO: 37 %
HDLC SERPL-MCNC: 69 MG/DL (ref 40–59)
HGB BLD-MCNC: 12.2 G/DL
LDLC SERPL CALC-MCNC: 82 MG/DL (ref ?–100)
MCH RBC QN AUTO: 29.9 PG (ref 26–34)
MCHC RBC AUTO-ENTMCNC: 33 G/DL (ref 31–37)
MCV RBC AUTO: 90.7 FL
NONHDLC SERPL-MCNC: 96 MG/DL (ref ?–130)
OSMOLALITY SERPL CALC.SUM OF ELEC: 298 MOSM/KG (ref 275–295)
PLATELET # BLD AUTO: 201 10(3)UL (ref 150–450)
POTASSIUM SERPL-SCNC: 4.1 MMOL/L (ref 3.5–5.1)
PROT SERPL-MCNC: 7.1 G/DL (ref 6.4–8.2)
RBC # BLD AUTO: 4.08 X10(6)UL
SODIUM SERPL-SCNC: 143 MMOL/L (ref 136–145)
TRIGL SERPL-MCNC: 71 MG/DL (ref 30–149)
VLDLC SERPL CALC-MCNC: 11 MG/DL (ref 0–30)
WBC # BLD AUTO: 3.2 X10(3) UL (ref 4–11)

## 2023-06-24 PROCEDURE — 36415 COLL VENOUS BLD VENIPUNCTURE: CPT

## 2023-06-24 PROCEDURE — 80061 LIPID PANEL: CPT

## 2023-06-24 PROCEDURE — 85027 COMPLETE CBC AUTOMATED: CPT

## 2023-06-24 PROCEDURE — 80053 COMPREHEN METABOLIC PANEL: CPT

## 2023-06-28 ENCOUNTER — OFFICE VISIT (OUTPATIENT)
Dept: INTERNAL MEDICINE CLINIC | Facility: CLINIC | Age: 78
End: 2023-06-28

## 2023-06-28 VITALS
HEIGHT: 67 IN | WEIGHT: 187.38 LBS | BODY MASS INDEX: 29.41 KG/M2 | SYSTOLIC BLOOD PRESSURE: 118 MMHG | HEART RATE: 48 BPM | DIASTOLIC BLOOD PRESSURE: 73 MMHG

## 2023-06-28 DIAGNOSIS — I70.0 AORTIC ATHEROSCLEROSIS (HCC): ICD-10-CM

## 2023-06-28 DIAGNOSIS — Z00.00 ANNUAL PHYSICAL EXAM: Primary | ICD-10-CM

## 2023-06-28 DIAGNOSIS — N18.30 STAGE 3 CHRONIC KIDNEY DISEASE, UNSPECIFIED WHETHER STAGE 3A OR 3B CKD (HCC): ICD-10-CM

## 2023-06-28 DIAGNOSIS — Z86.718 HISTORY OF DVT (DEEP VEIN THROMBOSIS): ICD-10-CM

## 2023-06-28 DIAGNOSIS — Z86.010 HISTORY OF COLON POLYPS: ICD-10-CM

## 2023-06-28 DIAGNOSIS — E78.00 HYPERCHOLESTEROLEMIA: ICD-10-CM

## 2023-06-28 DIAGNOSIS — K21.9 GASTROESOPHAGEAL REFLUX DISEASE WITHOUT ESOPHAGITIS: ICD-10-CM

## 2023-06-28 DIAGNOSIS — Z00.00 ENCOUNTER FOR ANNUAL HEALTH EXAMINATION: ICD-10-CM

## 2023-06-28 DIAGNOSIS — Z85.3 HISTORY OF BREAST CANCER: ICD-10-CM

## 2023-06-28 DIAGNOSIS — E04.2 MULTINODULAR GOITER: ICD-10-CM

## 2023-06-28 PROCEDURE — 1170F FXNL STATUS ASSESSED: CPT | Performed by: INTERNAL MEDICINE

## 2023-06-28 PROCEDURE — 3078F DIAST BP <80 MM HG: CPT | Performed by: INTERNAL MEDICINE

## 2023-06-28 PROCEDURE — 99213 OFFICE O/P EST LOW 20 MIN: CPT | Performed by: INTERNAL MEDICINE

## 2023-06-28 PROCEDURE — 3008F BODY MASS INDEX DOCD: CPT | Performed by: INTERNAL MEDICINE

## 2023-06-28 PROCEDURE — G0439 PPPS, SUBSEQ VISIT: HCPCS | Performed by: INTERNAL MEDICINE

## 2023-06-28 PROCEDURE — 96160 PT-FOCUSED HLTH RISK ASSMT: CPT | Performed by: INTERNAL MEDICINE

## 2023-06-28 PROCEDURE — 1159F MED LIST DOCD IN RCRD: CPT | Performed by: INTERNAL MEDICINE

## 2023-06-28 PROCEDURE — 3074F SYST BP LT 130 MM HG: CPT | Performed by: INTERNAL MEDICINE

## 2023-06-28 PROCEDURE — 1160F RVW MEDS BY RX/DR IN RCRD: CPT | Performed by: INTERNAL MEDICINE

## 2023-06-28 PROCEDURE — 1126F AMNT PAIN NOTED NONE PRSNT: CPT | Performed by: INTERNAL MEDICINE

## 2023-07-03 NOTE — PATIENT INSTRUCTIONS
Please rest and elevate your right leg and apply a warm compress for 10-15 minutes 3-4 times daily. Please take naproxen 500 mg twice daily with meals. Call if no better. Await results of blood tests.
Left nephrolithiasis

## 2023-07-17 ENCOUNTER — OFFICE VISIT (OUTPATIENT)
Dept: PODIATRY CLINIC | Facility: CLINIC | Age: 78
End: 2023-07-17

## 2023-07-17 ENCOUNTER — HOSPITAL ENCOUNTER (OUTPATIENT)
Dept: GENERAL RADIOLOGY | Facility: HOSPITAL | Age: 78
Discharge: HOME | End: 2023-07-17
Attending: PODIATRIST
Payer: MEDICARE

## 2023-07-17 ENCOUNTER — TELEPHONE (OUTPATIENT)
Dept: PODIATRY CLINIC | Facility: CLINIC | Age: 78
End: 2023-07-17

## 2023-07-17 DIAGNOSIS — M79.672 LEFT FOOT PAIN: ICD-10-CM

## 2023-07-17 DIAGNOSIS — M20.42 HAMMERTOE OF LEFT FOOT: ICD-10-CM

## 2023-07-17 DIAGNOSIS — M20.42 HAMMERTOE OF LEFT FOOT: Primary | ICD-10-CM

## 2023-07-17 PROCEDURE — 1125F AMNT PAIN NOTED PAIN PRSNT: CPT | Performed by: PODIATRIST

## 2023-07-17 PROCEDURE — 1159F MED LIST DOCD IN RCRD: CPT | Performed by: PODIATRIST

## 2023-07-17 PROCEDURE — 99204 OFFICE O/P NEW MOD 45 MIN: CPT | Performed by: PODIATRIST

## 2023-07-17 PROCEDURE — 73630 X-RAY EXAM OF FOOT: CPT | Performed by: PODIATRIST

## 2023-07-17 NOTE — PROGRESS NOTES
[facility], [facility] Podiatry  Progress Note    Lafaye Duane Adaline Gaskins is a 77year old female. Patient presents with: Foot Pain: Left- States that she has painful corns on 3rd and 4th toes. Patient states that she has had the corns for a few years. Rates pain at 4-5/10. HPI:     This is a pleasant female with CKD stage 3, aortic atherosclerosis, history of DVT Right LE, and PMH of former tobacco use. She is on baby aspirin. She presents to clinic today due to painful hammertoe on the left 2nd and 3rd toes. She is interested in surgery to correct the hammertoes. Allergies: Patient has no known allergies. Current Outpatient Medications   Medication Sig Dispense Refill    atorvastatin 20 MG Oral Tab Take 1 tablet (20 mg total) by mouth every evening. 90 tablet 3    aspirin 81 MG Oral Tab EC Take 1 tablet (81 mg total) by mouth daily. omeprazole 20 MG Oral Capsule Delayed Release Take 1 capsule (20 mg total) by mouth every morning before breakfast.      Vitamin D3 1000 units Oral Tab Take 1 tablet (1,000 Units total) by mouth daily.         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 pylori testing negative    History of colon polyps     Hypercholesterolemia     Osteoarthritis of right knee     Status post right TKA 10-20    Varicose veins of both lower extremities     Visual impairment     readers      Past Surgical History:   Procedure Laterality Date    BIOPSY OF BREAST, NEEDLE CORE Left 2008    Fibroadenoma    BIOPSY OF BREAST, NEEDLE CORE Right 02/10/2021    cork clip    BIOPSY OF SKIN LESION  2018    upper mid back epidermal inclusion cyst    CATARACT Right 2019    CATARACT Left 2019    COLONOSCOPY      [facility]    EGD N/A 2016    Procedure: ESOPHAGOGASTRODUODENOSCOPY (EGD); Surgeon: Sara Greene MD;  Location: [address] ARTHROSCOPY Right     MASTECTOMY,SIMPLE Right 2021    SALPINGECTOMY Left     Ectopic pregnancy    SENT LYMPH NODE BIOPSY Right 2021    STAB PHLEBECTOMY, VARICOSE VEINS, 1 EXTREMITY; <20 INCISIONS Right     TOTAL KNEE REPLACEMENT Right 10/09/2020      Family History   Problem Relation Age of Onset    Colon Cancer Father          age 28    Hypertension Mother     Glaucoma Mother     Diabetes Sister     Hypertension Sister     Glaucoma Sister     Breast Cancer Niece 48    Cancer Niece         thyroid cancer    Breast Cancer Self 68    Macular degeneration Neg     Ovarian Cancer Neg       Social History    Socioeconomic History      Marital status: Single      Number of children: 0    Occupational History      Occupation: Nurse - dialysis, OR        Comment: retired    Tobacco Use      Smoking status: Former        Packs/day: 0.50        Years: 2.00        Pack years: 1        Types: Cigarettes        Quit date: 1975        Years since quittin.6      Smokeless tobacco: Never    Vaping Use      Vaping Use: Never used    Substance and Sexual Activity      Alcohol use: Not Currently        Comment: socially      Drug use: No          REVIEW OF SYSTEMS:   Denies nausea, fever, chills  No calf pain  No other muscle or joint aches  Denies chest pain or shortness of breath.       EXAM:   There were no vitals taken for this visit. Constitutional:   Patient in no apparent distress. Well kept Of normal body habitus. Alert and oriented to person, place, and time. Integumentary examination:   There are no varicosities. Skin appears moist, warm, and supple with positive hair growth. There are no color changes. No open lesions. No macerations, No Hyperkeratotic lesions. Nails are of normal thickness and appearance. Vascular examination:   DP pulse is 2/4  PT pulse is 2/4  Capillary refill is immediate  Edema is not present bilateral.  Temperature warm proximally to warm distally bilateral.  Neurological examination:   Vibratory (128-Hz tuning fork) sensation is present to right and is present to left. Sharp/dull is present to right and is present to left. Musculoskeletal examination:  Muscle Strength is 5/5. Left 2nd and 3rd toes are contracted at the PIPJ with POP      LABS & IMAGING:     Lab Results   Component Value Date     (H) 06/24/2023    BUN 14 06/24/2023    CREATSERUM 1.19 (H) 06/24/2023    BUNCREA 11.8 06/24/2023    ANIONGAP 5 06/24/2023    GFRAA 44 (L) 06/20/2022    GFRNAA 38 (L) 06/20/2022    CA 8.6 06/24/2023     06/24/2023    K 4.1 06/24/2023     06/24/2023    CO2 26.0 06/24/2023    OSMOCALC 298 (H) 06/24/2023        No results found for: EAG, A1C     No results found. ASSESSMENT AND PLAN:   Diagnoses and all orders for this visit:    Hammertoe of left foot    Left foot pain        Plan:     Evaluated the patient and discussed treatment options with the patient. Discussed the biomechanical causes and implications of hammertoe deformity. Discussed conservative vs. surgical treatment options. Discussed surgical options to vary from tenotomy, to arthroplasty without pinning, to arthroplasty with pinning, and fusion depending on overall goals. Recommended extra depth and extra width shoes. Xray left foot full WB: 7/17/23: Revealed contracture of toes 2 and 3 at the PIPJ.       Pt would like to proceed with surgical correction of her left foot hammertoe deformities. Will proceed with left 2nd and 3rd toe arthroplasty     Pt will need to be PWB left foot with post op shoe post op    She will need cardiac clearance and to hold the baby aspirin prior to surgery    The patient has been advised of the approximate disability involved for these procedures. In addition, the patient has been advised as to the alternatives of care, including continued conservative care as well as surgical procedures. The patient has failed all conservative treatment at this point. The patient understands that if surgical procedures are performed, there are risks and complications that could occur, including but not limited to: hematoma formation, damage to the nervous system, seroma formation, development of a DVT or phlebitis, infection, painful scar tissue formation, stiffness, limited motion, delayed-union, non-union, mal-union, impaired healing, increased chance of swelling, swelling, reaction to implanted biomaterials, over-correction, under-correction with recurrence of the deformities, continued pain, loss of limb, loss of life, and the possibility that future surgery may need to be performed. The patient was given the opportunity to ask questions which were answered. The patient voiced no concerns and will consider all these options. Patient desires surgical intervention. No guarantees were given or implied. Pt consented freely to surgical intervention. I spent approximately 30 minutes discussing the surgical procedures at length. I reviewed in detail the procedure to be performed, postoperative course, disability to be expected, healing time, prognosis and the importance of their following the recommended postoperative care.  Possible complications discussed included but not limited to recurrence of deformity, postoperative pain, swelling, stiffness, rejection of the implant if utilized, return to surgery, infection, residual pain, possible blood clot formation, bleeding, etc. Possible complications relating to over activity and limitations during the postoperative period were reviewed. The patient has responsibilities to help insure successful outcome of the surgery. Postoperative oral and written instructions were reviewed and postoperative course of treatment discussed in detail. Management of postoperative pain was discussed. All questions related to preoperative, operative and postoperative course of treatment were answered to their understanding and satisfaction. RTO and follow up after surgery is necessary and expected and agreed to by the patient. The patient acknowledged understanding of the above and agrees to follow all instructions and protocols. No guarantee or warranty was given or implied as to the results of the surgery. No follow-ups on file.     Rachel Guevara DPM  7/17/2023

## 2023-07-17 NOTE — TELEPHONE ENCOUNTER
Procedure:   Left 2nd and 3rd toe arthroplasty   CPT code:   Length of Surgery: 60 minutes  Any Instruments: Osteomed 1.5mm resorbable inion pin  Call patient: within 24 hours  Anesthesia: MAC  Location: [facility]  Assistance: none  Pacemaker: No  Anticoagulants: Yes  Nickel Allergy: No  Latex Allergy: No  Diagnosis/ICD Code:   No diagnosis found.     She will need cardiac clearance and to hold the baby aspirin prior to surgery

## 2023-07-18 DIAGNOSIS — M20.42 HAMMERTOE OF LEFT FOOT: Primary | ICD-10-CM

## 2023-07-18 DIAGNOSIS — M79.672 LEFT FOOT PAIN: ICD-10-CM

## 2023-07-18 NOTE — TELEPHONE ENCOUNTER
Spoke with patient and scheduled surgery on 8/10/23. Discussed pre-op instructions and mailed to patients home per her request. Patient instructed to see cardiologist within 30 days of SX date for clearance and to stop baby aspirin prior to surgery. Placed manage care order and made both post-op visits.

## 2023-07-25 ENCOUNTER — TELEPHONE (OUTPATIENT)
Facility: CLINIC | Age: 78
End: 2023-07-25

## 2023-07-25 DIAGNOSIS — Z86.010 PERSONAL HISTORY OF COLONIC POLYPS: Primary | ICD-10-CM

## 2023-07-25 NOTE — TELEPHONE ENCOUNTER
REScheduled for:  Colonoscopy [place]  Provider Name:  Dr Kerri Zimmerman  Date: from- 8/4/2023 to 11/17/2023  Location:  [facility]   Sedation:  MAC  Time: from- 3pm to-2pm (Patient is aware arrival time is at 1:00pm)  Prep:  Trilyte   Meds/Allergies Reconciled?:  Physician Reviewed  Diagnosis with codes:  Hx of Colon Polyps Z86.010  Was patient informed to call insurance with codes (Y/N):  Yes  Referral sent?:  Referral was sent at the time of electronic surgical scheduling. [facility] or [facility]  notified?:  I sent an electronic request to Endo Scheduling and received a confirmation today. Medication Orders:  Pt is aware to NOT take iron pills, herbal meds and diet supplements for 7 days before exam. Also to NOT take any form of alcohol, recreational drugs and any forms of ED meds 24 hours before exam.   Misc Orders:       Further instructions given by staff: I discussed the prep instructions with the patient which she verbally understood and is aware that I will mail the instructions today.

## 2023-08-10 ENCOUNTER — PROCEDURE (OUTPATIENT)
Age: 78
End: 2023-08-10

## 2023-08-10 PROCEDURE — 28285 REPAIR OF HAMMERTOE: CPT | Performed by: PODIATRIST

## 2023-08-10 RX ORDER — HYDROCODONE BITARTRATE AND ACETAMINOPHEN 5; 325 MG/1; MG/1
1 TABLET ORAL EVERY 4 HOURS PRN
Qty: 30 TABLET | Refills: 0 | Status: SHIPPED | OUTPATIENT
Start: 2023-08-10 | End: 2023-08-15

## 2023-08-10 NOTE — PROCEDURES
OPERATIVE REPORT     [facility] Patient Status:  No patient class for patient encounter    1945 MRN [id number]   Location [address] [facility] Attending No att. providers found   [facility] Day # 0 PCP Donald Alvarez MD     Date of Surgery:  8/10/23    Preoperative Diagnosis:     Hammertoe of left foot     Left foot pain    Postoperative Diagnosis: same    Primary Surgeon: Ace Horton DPM    Assistant: none    Procedures:   Left 2nd toe arthroplasty  Left 3rd toe arthroplasty    Anesthesia: MAC with local    Complications: none    Implants: 1.5mm osteomed inion resorbable pin    Specimen: none    Drains: none    Condition: stable    Estimated Blood Loss: minimal    Preoperative history/indications:  Patient presented to Ace Horton DPM due to left foot painful hammertoes. All preoperative conservative care have failed to resolve the patient's pain and discomfort. The patient would like to proceed with surgery. Informed Consent: All treatment options have been discussed with the pt of both conservative and surgical attempt at correction including the potential risks and complications of surgical intervention including but not exhaustive of death, loss of limb, post op pain, swelling, infection, bleeding, reoccurrence of the deformity, rotation, elevation, malposition, extended healing, non-union or delayed union and the possibility of further and future surgery. No guarantees have been made to the pt and the informed consent has been signed. Procedure in detail:  The pt was brought into the operating room and placed on the operating table in the supine position. A pneumatic tourniquet was placed about the patient's Left ankle. Following IV sedation, local anesthesia was obtained about the left foot utilizing 10 cc's of a 1:1 mixture of 1% lidocaine plain and 0.5% marcaine plain. The foot was then scrubbed, prepped, and draped in the usual aseptic manner.   The pneumatic tourniquet was inflated. Attention was directed to the left 2nd and 3rd digit where a linear longitudinal incisions were made over the dorsal aspect of the PIPJ and encompassed a dorsal callus. The incisions were deepened through the subcutaneous tissues, with care being taken to identify and retract all vital neural and vascular structures. At this time, a transverse tenotomy and capsulotomy was performed to the PIPJ of the 2nd and 3rd digit left foot. The head of the proximal phalanx was then freed of its capsular and ligamentous attachments. Next utilizing the oscillating bone saw, the head of the proximal phalanx was resected and passed from the operative site. At this time the 1.5mm drill for the inion pin was driven through the distal central medullary canal of the 2nd and 3rd proximal phalanges. The drive was then driven into the central base of the 2nd and 3rd middle phalanges in order to receive the distal stem of the implant. Next the 1.5mm inion pin was manually inserted into the 2nd and 3rd toe distal proximal phalanges and the distal end were inserted into the base of the middle phalanges. Compression was applied to the 2nd and 3rd toes to compression the PIPJ which revealed rectus nature of the 2nd and 3rd toes. Fluoroscopy was obtained which revealed a rectus 2nd and 3rd toes. The wounds were flushed with saline. The extensor tendons were repaired using vicryl and the skin was repaired using nylon. Upon completion of the procedure, a postoperative block consisting of 10 cc's of 0.5% Marcaine plain was injected along the incision site. The incision was dressed with betadine soaked adaptic and covered with sterile compressive dressings consisting of gauze, nael, kerlix, and webril. The pneumatic ankle tourniquet was then deflated and a prompt hyperemic response was noted to all digits of the left foot. An ace wrap was then applied.   The pt tolerated the procedure and anesthesia well.  she was transferred to the recovery room with VSS and vascular status intact. Following a period of postoperative monitoring, the pt will be discharged home on the following written and oral postop instructions: keep dressings CDI, avoid excessive ambulation, ice and elevate foot when at rest, wear surgical shoe at all times when ambulating, contact my office for all postoperative f/u care and should any problems arise.       Darrell Landa DPM   8/10/23

## 2023-08-18 ENCOUNTER — HOSPITAL ENCOUNTER (OUTPATIENT)
Dept: GENERAL RADIOLOGY | Facility: HOSPITAL | Age: 78
Discharge: HOME | End: 2023-08-18
Attending: PODIATRIST
Payer: MEDICARE

## 2023-08-18 ENCOUNTER — OFFICE VISIT (OUTPATIENT)
Dept: PODIATRY CLINIC | Facility: CLINIC | Age: 78
End: 2023-08-18

## 2023-08-18 DIAGNOSIS — Z98.890 POST-OPERATIVE STATE: ICD-10-CM

## 2023-08-18 DIAGNOSIS — Z98.890 S/P FOOT SURGERY, LEFT: ICD-10-CM

## 2023-08-18 DIAGNOSIS — M79.672 LEFT FOOT PAIN: ICD-10-CM

## 2023-08-18 DIAGNOSIS — Z98.890 S/P FOOT SURGERY, LEFT: Primary | ICD-10-CM

## 2023-08-18 PROCEDURE — 1126F AMNT PAIN NOTED NONE PRSNT: CPT | Performed by: PODIATRIST

## 2023-08-18 PROCEDURE — 99024 POSTOP FOLLOW-UP VISIT: CPT | Performed by: PODIATRIST

## 2023-08-18 PROCEDURE — 1159F MED LIST DOCD IN RCRD: CPT | Performed by: PODIATRIST

## 2023-08-18 PROCEDURE — 73630 X-RAY EXAM OF FOOT: CPT | Performed by: PODIATRIST

## 2023-08-18 NOTE — PROGRESS NOTES
[facility] Podiatry  Progress Note    Davidette Shown Soham Lynn is a 77year old female. Patient presents with:  Post-Op: 1 st Post-Op Visit on Left Foot. Denies pain but having tingling. HPI:     This is a pleasant female with CKD stage 3, aortic atherosclerosis, history of DVT Right LE, and PMH of former tobacco use. She is on baby aspirin. She presents to clinic today 1 week S/P left 2nd and 3rd toe arthroplasty. She has kept dressings CDI and is wearing the post op shoe. She does admit to doing a lot of walking. She states her pain is well controlled. Allergies: Patient has no known allergies. Current Outpatient Medications   Medication Sig Dispense Refill    atorvastatin 20 MG Oral Tab Take 1 tablet (20 mg total) by mouth every evening. 90 tablet 3    aspirin 81 MG Oral Tab EC Take 1 tablet (81 mg total) by mouth daily. omeprazole 20 MG Oral Capsule Delayed Release Take 1 capsule (20 mg total) by mouth every morning before breakfast.      Vitamin D3 1000 units Oral Tab Take 1 tablet (1,000 Units total) by mouth daily.         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 pylori testing negative    History of colon polyps     Hypercholesterolemia     Osteoarthritis of right knee     Status post right TKA 10-20    Varicose veins of both lower extremities     Visual impairment     readers      Past Surgical History:   Procedure Laterality Date    BIOPSY OF BREAST, NEEDLE CORE Left 2008    Fibroadenoma    BIOPSY OF BREAST, NEEDLE CORE Right 02/10/2021    cork clip    BIOPSY OF SKIN LESION  2018    upper mid back epidermal inclusion cyst    CATARACT Right 2019    CATARACT Left 2019    COLONOSCOPY      [facility]    EGD N/A 2016    Procedure: ESOPHAGOGASTRODUODENOSCOPY (EGD); Surgeon: John Khoury MD;  Location: [address] ARTHROSCOPY Right     MASTECTOMY,SIMPLE Right 2021    SALPINGECTOMY Left     Ectopic pregnancy    SENT LYMPH NODE BIOPSY Right 2021    STAB PHLEBECTOMY, VARICOSE VEINS, 1 EXTREMITY; <20 INCISIONS Right     TOTAL KNEE REPLACEMENT Right 10/09/2020      Family History   Problem Relation Age of Onset    Colon Cancer Father          age 28    Hypertension Mother     Glaucoma Mother     Diabetes Sister     Hypertension Sister     Glaucoma Sister     Breast Cancer Niece 48    Cancer Niece         thyroid cancer    Breast Cancer Self 68    Macular degeneration Neg     Ovarian Cancer Neg       Social History    Socioeconomic History      Marital status: Single      Number of children: 0    Occupational History      Occupation: Nurse - dialysis, OR        Comment: retired    Tobacco Use      Smoking status: Former        Packs/day: 0.50        Years: 2.00        Pack years: 1        Types: Cigarettes        Quit date: 1975        Years since quittin.7      Smokeless tobacco: Never    Vaping Use      Vaping Use: Never used    Substance and Sexual Activity      Alcohol use: Not Currently        Comment: socially      Drug use: No          REVIEW OF SYSTEMS:   Denies nausea, fever, chills  No calf pain  No other muscle or joint aches  Denies chest pain or shortness of breath.       EXAM:   There were no vitals taken for this visit. Constitutional:   Patient in no apparent distress. Well kept Of normal body habitus. Alert and oriented to person, place, and time. Integumentary examination:   There are no varicosities. Skin appears moist, warm, and supple with positive hair growth. Left dorsal 2nd and 3rd toe incision sites are well adhered with sutures intact and no SOI    Vascular examination:   DP pulse is 2/4  PT pulse is 2/4  Capillary refill is immediate  Edema is not present bilateral.  Temperature warm proximally to warm distally bilateral.  Neurological examination:   Vibratory (128-Hz tuning fork) sensation is present to right and is present to left. Sharp/dull is present to right and is present to left. Musculoskeletal examination:  Muscle Strength is 5/5. Left 2nd and 3rd toes are rectus      LABS & IMAGING:     Lab Results   Component Value Date     (H) 06/24/2023    BUN 14 06/24/2023    CREATSERUM 1.19 (H) 06/24/2023    BUNCREA 11.8 06/24/2023    ANIONGAP 5 06/24/2023    GFRAA 44 (L) 06/20/2022    GFRNAA 38 (L) 06/20/2022    CA 8.6 06/24/2023     06/24/2023    K 4.1 06/24/2023     06/24/2023    CO2 26.0 06/24/2023    OSMOCALC 298 (H) 06/24/2023        No results found for: EAG, A1C     No results found. ASSESSMENT AND PLAN:   Diagnoses and all orders for this visit:    S/P foot surgery, left    Post-operative state          Plan:     Evaluated Left 2nd and 3rd toe incision sites which are healing well with no SOI. Applied DSD to left foot, pt to keep CDI     Pt to be PWB left foot with post op shoe using heel short distances. Educated pt on the importance of limited walking and elevation to decrease the swelling. Ordered left foot xrays    RTC 1 week for suture removal and xray review        No follow-ups on file.     Anthony Gilliam DPM  8/18/23

## 2023-08-25 ENCOUNTER — OFFICE VISIT (OUTPATIENT)
Dept: PODIATRY CLINIC | Facility: CLINIC | Age: 78
End: 2023-08-25

## 2023-08-25 DIAGNOSIS — Z98.890 POST-OPERATIVE STATE: ICD-10-CM

## 2023-08-25 DIAGNOSIS — Z98.890 S/P FOOT SURGERY, LEFT: Primary | ICD-10-CM

## 2023-08-25 PROCEDURE — 99024 POSTOP FOLLOW-UP VISIT: CPT | Performed by: PODIATRIST

## 2023-08-25 PROCEDURE — 1159F MED LIST DOCD IN RCRD: CPT | Performed by: PODIATRIST

## 2023-08-25 NOTE — PROGRESS NOTES
[facility] Podiatry  Progress Note    Raúl Arnold is a 77year old female. Patient presents with:  Post-Op: 2nd POV ,SX 2nd and 3rd toe on 8/10/23 here for suture removal. No pain, slight tingling. HPI:     This is a pleasant female with CKD stage 3, aortic atherosclerosis, history of DVT Right LE, and PMH of former tobacco use. She is on baby aspirin. She presents to clinic today 2 week S/P left 2nd and 3rd toe arthroplasty. She has kept dressings CDI and is wearing the post op shoe. She does admit to doing a lot of walking. She states her pain is well controlled. Allergies: Patient has no known allergies. Current Outpatient Medications   Medication Sig Dispense Refill    atorvastatin 20 MG Oral Tab Take 1 tablet (20 mg total) by mouth every evening. 90 tablet 3    aspirin 81 MG Oral Tab EC Take 1 tablet (81 mg total) by mouth daily. omeprazole 20 MG Oral Capsule Delayed Release Take 1 capsule (20 mg total) by mouth every morning before breakfast.      Vitamin D3 1000 units Oral Tab Take 1 tablet (1,000 Units total) by mouth daily.         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 pylori testing negative    History of colon polyps     Hypercholesterolemia     Osteoarthritis of right knee     Status post right TKA 10-20 Varicose veins of both lower extremities     Visual impairment     readers      Past Surgical History:   Procedure Laterality Date    BIOPSY OF BREAST, NEEDLE CORE Left 2008    Fibroadenoma    BIOPSY OF BREAST, NEEDLE CORE Right 02/10/2021    cork clip    BIOPSY OF SKIN LESION  2018    upper mid back epidermal inclusion cyst    CATARACT Right 2019    CATARACT Left 2019    COLONOSCOPY      [facility]    EGD N/A 2016    Procedure: ESOPHAGOGASTRODUODENOSCOPY (EGD); Surgeon: Suad Meraz MD;  Location: [address] ARTHROSCOPY Right     MASTECTOMY,SIMPLE Right 2021    SALPINGECTOMY Left     Ectopic pregnancy    SENT LYMPH NODE BIOPSY Right 2021    STAB PHLEBECTOMY, VARICOSE VEINS, 1 EXTREMITY; <20 INCISIONS Right     TOTAL KNEE REPLACEMENT Right 10/09/2020      Family History   Problem Relation Age of Onset    Colon Cancer Father          age 28    Hypertension Mother     Glaucoma Mother     Diabetes Sister     Hypertension Sister     Glaucoma Sister     Breast Cancer Niece 48    Cancer Niece         thyroid cancer    Breast Cancer Self 67    Macular degeneration Neg     Ovarian Cancer Neg       Social History    Socioeconomic History      Marital status: Single      Number of children: 0    Occupational History      Occupation: Nurse - dialysis, OR        Comment: retired    Tobacco Use      Smoking status: Former        Packs/day: 0.50        Years: 2.00        Pack years: 1        Types: Cigarettes        Quit date: 1975        Years since quittin.8      Smokeless tobacco: Never    Vaping Use      Vaping Use: Never used    Substance and Sexual Activity      Alcohol use: Not Currently        Comment: socially      Drug use: No          REVIEW OF SYSTEMS:   Denies nausea, fever, chills  No calf pain  No other muscle or joint aches  Denies chest pain or shortness of breath.       EXAM:   There were no vitals taken for this visit. Constitutional:   Patient in no apparent distress. Well kept Of normal body habitus. Alert and oriented to person, place, and time. Integumentary examination:   There are no varicosities. Skin appears moist, warm, and supple with positive hair growth. Left dorsal 2nd and 3rd toe incision sites are well adhered with sutures intact and no SOI    Vascular examination:   DP pulse is 2/4  PT pulse is 2/4  Capillary refill is immediate  Edema is not present bilateral.  Temperature warm proximally to warm distally bilateral.  Neurological examination:   Vibratory (128-Hz tuning fork) sensation is present to right and is present to left. Sharp/dull is present to right and is present to left. Musculoskeletal examination:  Muscle Strength is 5/5. Left 2nd and 3rd toes are rectus      LABS & IMAGING:     Lab Results   Component Value Date     (H) 06/24/2023    BUN 14 06/24/2023    CREATSERUM 1.19 (H) 06/24/2023    BUNCREA 11.8 06/24/2023    ANIONGAP 5 06/24/2023    GFRAA 44 (L) 06/20/2022    GFRNAA 38 (L) 06/20/2022    CA 8.6 06/24/2023     06/24/2023    K 4.1 06/24/2023     06/24/2023    CO2 26.0 06/24/2023    OSMOCALC 298 (H) 06/24/2023        No results found for: EAG, A1C     No results found. ASSESSMENT AND PLAN:   Diagnoses and all orders for this visit:    S/P foot surgery, left    Post-operative state            Plan:     Evaluated Left 2nd and 3rd toe incision sites which are healing well with no SOI. Removed sutures without incident  Pt may get left foot wet in shower but not to submerge in water    Pt to be WBAT left foot with post op shoe for short distances. Educated pt on the importance of limited walking and elevation to decrease the swelling.       Left foot xrays: 8/18/23 revealed rectus nature of toes 2 and 3 at the PIP[facility]    RTC 2 weeks for possible final post op, if improved will transition to regular shoes         No follow-ups on file.    Jada Zuniga, DPM  8/25/23

## 2023-09-08 ENCOUNTER — OFFICE VISIT (OUTPATIENT)
Dept: PODIATRY CLINIC | Facility: CLINIC | Age: 78
End: 2023-09-08

## 2023-09-08 DIAGNOSIS — Z98.890 S/P FOOT SURGERY, LEFT: Primary | ICD-10-CM

## 2023-09-08 DIAGNOSIS — Z98.890 POST-OPERATIVE STATE: ICD-10-CM

## 2023-09-08 PROCEDURE — 1159F MED LIST DOCD IN RCRD: CPT | Performed by: PODIATRIST

## 2023-09-08 PROCEDURE — 99024 POSTOP FOLLOW-UP VISIT: CPT | Performed by: PODIATRIST

## 2023-09-08 NOTE — PROGRESS NOTES
[facility] Podiatry  Progress Note    Melissa Yamila Lopez is a 77year old female. Patient presents with:  Post-Op: 3rd POV: L foot on 8/10. Pt states she is not having pain. Denies any tingling or numbeness. HPI:     This is a pleasant female with CKD stage 3, aortic atherosclerosis, history of DVT Right LE, and PMH of former tobacco use. She is on baby aspirin. She presents to clinic today 4 week S/P left 2nd and 3rd toe arthroplasty. She is wearing the post op shoe without pain. She does admit to doing a lot of walking. Allergies: Patient has no known allergies. Current Outpatient Medications   Medication Sig Dispense Refill    atorvastatin 20 MG Oral Tab Take 1 tablet (20 mg total) by mouth every evening. 90 tablet 3    aspirin 81 MG Oral Tab EC Take 1 tablet (81 mg total) by mouth daily. omeprazole 20 MG Oral Capsule Delayed Release Take 1 capsule (20 mg total) by mouth every morning before breakfast.      Vitamin D3 1000 units Oral Tab Take 1 tablet (1,000 Units total) by mouth daily.         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 pylori testing negative    History of colon polyps     Hypercholesterolemia     Osteoarthritis of right knee     Status post right TKA 10-20    Varicose veins of both lower extremities     Visual impairment     readers      Past Surgical History:   Procedure Laterality Date    BIOPSY OF BREAST, NEEDLE CORE Left 2008    Fibroadenoma    BIOPSY OF BREAST, NEEDLE CORE Right 02/10/2021    cork clip    BIOPSY OF SKIN LESION  2018    upper mid back epidermal inclusion cyst    CATARACT Right 2019    CATARACT Left 2019    COLONOSCOPY      [facility]    EGD N/A 2016    Procedure: ESOPHAGOGASTRODUODENOSCOPY (EGD); Surgeon: Vipul Echeverria MD;  Location: [address] ARTHROSCOPY Right     MASTECTOMY,SIMPLE Right 2021    SALPINGECTOMY Left     Ectopic pregnancy    SENT LYMPH NODE BIOPSY Right 2021    STAB PHLEBECTOMY, VARICOSE VEINS, 1 EXTREMITY; <20 INCISIONS Right     TOTAL KNEE REPLACEMENT Right 10/09/2020      Family History   Problem Relation Age of Onset    Colon Cancer Father          age 28    Hypertension Mother     Glaucoma Mother     Diabetes Sister     Hypertension Sister     Glaucoma Sister     Breast Cancer Niece 48    Cancer Niece         thyroid cancer    Breast Cancer Self 68    Macular degeneration Neg     Ovarian Cancer Neg       Social History    Socioeconomic History      Marital status: Single      Number of children: 0    Occupational History      Occupation: Nurse - dialysis, OR        Comment: retired    Tobacco Use      Smoking status: Former        Packs/day: 0.50        Years: 2.00        Pack years: 1        Types: Cigarettes        Quit date: 1975        Years since quittin.8      Smokeless tobacco: Never    Vaping Use      Vaping Use: Never used    Substance and Sexual Activity      Alcohol use: Not Currently        Comment: socially      Drug use: No          REVIEW OF SYSTEMS:   Denies nausea, fever, chills  No calf pain  No other muscle or joint aches  Denies chest pain or shortness of breath. EXAM:   There were no vitals taken for this visit.     Constitutional: Patient in no apparent distress. Well kept Of normal body habitus. Alert and oriented to person, place, and time. Integumentary examination:   There are no varicosities. Skin appears moist, warm, and supple with positive hair growth. Left dorsal 2nd and 3rd toe incision sites are well healed    Vascular examination:   DP pulse is 2/4  PT pulse is 2/4  Capillary refill is immediate  Edema is not present bilateral.  Temperature warm proximally to warm distally bilateral.  Neurological examination:   Vibratory (128-Hz tuning fork) sensation is present to right and is present to left. Sharp/dull is present to right and is present to left. Musculoskeletal examination:  Muscle Strength is 5/5. Left 2nd and 3rd toes are rectus      LABS & IMAGING:     Lab Results   Component Value Date     (H) 06/24/2023    BUN 14 06/24/2023    CREATSERUM 1.19 (H) 06/24/2023    BUNCREA 11.8 06/24/2023    ANIONGAP 5 06/24/2023    GFRAA 44 (L) 06/20/2022    GFRNAA 38 (L) 06/20/2022    CA 8.6 06/24/2023     06/24/2023    K 4.1 06/24/2023     06/24/2023    CO2 26.0 06/24/2023    OSMOCALC 298 (H) 06/24/2023        No results found for: EAG, A1C     No results found. ASSESSMENT AND PLAN:   Diagnoses and all orders for this visit:    S/P foot surgery, left    Post-operative state              Plan:     Evaluated Left 2nd and 3rd toe incision sites which have healed      Pt may transition to supportive tennis shoes. She is to avoid any excessive ambulation and barefoot walking for another 4 weeks. Left foot xrays: 8/18/23 revealed rectus nature of toes 2 and 3 at the PIP[facility]    RTC PRN        No follow-ups on file.     Lacie Bear DPM  9/8/23

## 2023-09-11 NOTE — TELEPHONE ENCOUNTER
Patient notified that outpatient PT order has been placed. She had no further questions. RN Care Coordinator: Laura Danielle    Surgery is scheduled with Dr. Arredondo  Date: 3/20   Location: [facility]      H&P to be completed by: Primary Care team; patient to schedule     Post-op: 3/29 with Dr. Arredondo, Yulia Tellez CSC      Patient will receive a phone call from pre-admission nurses 1-2 days prior to surgery with arrival and start time.       Spoke with the patient and was able to confirm all scheduled information.       Patient questions/concerns: N/A       Surgery packet: to be sent via [employer]    __    Kera Curry Senior Perioperative Coordinator, on 9/11/2023 at 1:00 PM  P: [phone]

## 2023-10-03 ENCOUNTER — TELEPHONE (OUTPATIENT)
Dept: INTERNAL MEDICINE CLINIC | Facility: CLINIC | Age: 78
End: 2023-10-03

## 2023-10-03 DIAGNOSIS — M20.40 HAMMER TOE, UNSPECIFIED LATERALITY: Primary | ICD-10-CM

## 2023-10-03 NOTE — TELEPHONE ENCOUNTER
Patient is calling to advise PCP she needs a referral placed for her surgical orthpedic boot she received.    Per [employer] she received a statement noting to contact her PCP to have the order/referral placed so they will reconsider the cost.  Patient had surgery on 8/10/2023 with Dr. Jose Muniz.    [employer]  [address]  [place]  [address]    Statement ID Account #[id number]  $49.00 Cost    Phone     Patient received the item on 8/10/2023.    Please advise.

## 2023-10-03 NOTE — TELEPHONE ENCOUNTER
Dr. Lambert,     Patient is requesting a referral to [employer] for a boot.     Pended referral please review diagnosis and sign off if you agree.    Thank you.  Belinda De Los Santos  Managed Care

## 2023-10-16 ENCOUNTER — TELEPHONE (OUTPATIENT)
Dept: CASE MANAGEMENT | Age: 78
End: 2023-10-16

## 2023-10-16 DIAGNOSIS — Z12.31 BREAST CANCER SCREENING BY MAMMOGRAM: Primary | ICD-10-CM

## 2023-10-16 NOTE — TELEPHONE ENCOUNTER
Dr. Ester Miles,     The patient is requesting a referral for a mammogram.     Please call patient when referral has been placed, she does not use her MyChart. Thank you.

## 2023-11-07 ENCOUNTER — TELEPHONE (OUTPATIENT)
Facility: CLINIC | Age: 78
End: 2023-11-07

## 2023-11-08 NOTE — TELEPHONE ENCOUNTER
Karina Najerachas, [address]    7/25/23 10:49 AM  Note  REScheduled for:  Colonoscopy [place]  Provider Name:  Dr Hetal Landa  Date: from- 8/4/2023 to 11/17/2023  Location:  [facility]   Sedation:  MAC  Time: from- 3pm to-2pm (Patient is aware arrival time is at 1:00pm)  Prep:  Trilyte   Meds/Allergies Reconciled?:  Physician Reviewed  Diagnosis with codes:  Hx of Colon Polyps Z86.010  Was patient informed to call insurance with codes (Y/N):  Yes  Referral sent?:  Referral was sent at the time of electronic surgical scheduling. [facility] or [address] notified?:  I sent an electronic request to Endo Scheduling and received a confirmation today. Medication Orders:  Pt is aware to NOT take iron pills, herbal meds and diet supplements for 7 days before exam. Also to NOT take any form of alcohol, recreational drugs and any forms of ED meds 24 hours before exam.   Misc Orders:       Further instructions given by staff: I discussed the prep instructions with the patient which she verbally understood and is aware that I will mail the instructions today.

## 2023-11-08 NOTE — TELEPHONE ENCOUNTER
I called [facility]    They have to order in the PEG 3350    They will have it either tomorrow or Thursday    I left a detailed message on the pt's voice mail with this information

## 2023-11-17 ENCOUNTER — ANESTHESIA (OUTPATIENT)
Dept: ENDOSCOPY | Facility: HOSPITAL | Age: 78
End: 2023-11-17
Payer: MEDICARE

## 2023-11-17 ENCOUNTER — ANESTHESIA EVENT (OUTPATIENT)
Dept: ENDOSCOPY | Facility: HOSPITAL | Age: 78
End: 2023-11-17
Payer: MEDICARE

## 2023-11-17 ENCOUNTER — HOSPITAL ENCOUNTER (OUTPATIENT)
Facility: HOSPITAL | Age: 78
Setting detail: HOSPITAL OUTPATIENT SURGERY
Discharge: HOME | End: 2023-11-17
Attending: STUDENT IN AN ORGANIZED HEALTH CARE EDUCATION/TRAINING PROGRAM | Admitting: STUDENT IN AN ORGANIZED HEALTH CARE EDUCATION/TRAINING PROGRAM
Payer: MEDICARE

## 2023-11-17 VITALS
HEIGHT: 67.5 IN | TEMPERATURE: 97 F | RESPIRATION RATE: 20 BRPM | DIASTOLIC BLOOD PRESSURE: 72 MMHG | BODY MASS INDEX: 29.01 KG/M2 | WEIGHT: 187 LBS | SYSTOLIC BLOOD PRESSURE: 123 MMHG | OXYGEN SATURATION: 98 % | HEART RATE: 67 BPM

## 2023-11-17 DIAGNOSIS — Z86.010 HX OF COLONIC POLYPS: ICD-10-CM

## 2023-11-17 PROCEDURE — 0DBN8ZX EXCISION OF SIGMOID COLON, VIA NATURAL OR ARTIFICIAL OPENING ENDOSCOPIC, DIAGNOSTIC: ICD-10-PCS | Performed by: STUDENT IN AN ORGANIZED HEALTH CARE EDUCATION/TRAINING PROGRAM

## 2023-11-17 PROCEDURE — 45385 COLONOSCOPY W/LESION REMOVAL: CPT | Performed by: STUDENT IN AN ORGANIZED HEALTH CARE EDUCATION/TRAINING PROGRAM

## 2023-11-17 PROCEDURE — 0DBK8ZX EXCISION OF ASCENDING COLON, VIA NATURAL OR ARTIFICIAL OPENING ENDOSCOPIC, DIAGNOSTIC: ICD-10-PCS | Performed by: STUDENT IN AN ORGANIZED HEALTH CARE EDUCATION/TRAINING PROGRAM

## 2023-11-17 PROCEDURE — 45380 COLONOSCOPY AND BIOPSY: CPT | Performed by: STUDENT IN AN ORGANIZED HEALTH CARE EDUCATION/TRAINING PROGRAM

## 2023-11-17 PROCEDURE — 0DBL8ZX EXCISION OF TRANSVERSE COLON, VIA NATURAL OR ARTIFICIAL OPENING ENDOSCOPIC, DIAGNOSTIC: ICD-10-PCS | Performed by: STUDENT IN AN ORGANIZED HEALTH CARE EDUCATION/TRAINING PROGRAM

## 2023-11-17 RX ORDER — LIDOCAINE HYDROCHLORIDE 10 MG/ML
INJECTION, SOLUTION EPIDURAL; INFILTRATION; INTRACAUDAL; PERINEURAL AS NEEDED
Status: DISCONTINUED | OUTPATIENT
Start: 2023-11-17 | End: 2023-11-17 | Stop reason: SURG

## 2023-11-17 RX ORDER — EPHEDRINE SULFATE 50 MG/ML
INJECTION INTRAVENOUS AS NEEDED
Status: DISCONTINUED | OUTPATIENT
Start: 2023-11-17 | End: 2023-11-17 | Stop reason: SURG

## 2023-11-17 RX ORDER — NALOXONE HYDROCHLORIDE 0.4 MG/ML
0.08 INJECTION, SOLUTION INTRAMUSCULAR; INTRAVENOUS; SUBCUTANEOUS ONCE AS NEEDED
Status: DISCONTINUED | OUTPATIENT
Start: 2023-11-17 | End: 2023-11-17

## 2023-11-17 RX ORDER — SODIUM CHLORIDE, SODIUM LACTATE, POTASSIUM CHLORIDE, CALCIUM CHLORIDE 600; 310; 30; 20 MG/100ML; MG/100ML; MG/100ML; MG/100ML
INJECTION, SOLUTION INTRAVENOUS CONTINUOUS
Status: DISCONTINUED | OUTPATIENT
Start: 2023-11-17 | End: 2023-11-17

## 2023-11-17 RX ADMIN — SODIUM CHLORIDE, SODIUM LACTATE, POTASSIUM CHLORIDE, CALCIUM CHLORIDE: 600; 310; 30; 20 INJECTION, SOLUTION INTRAVENOUS at 15:16:00

## 2023-11-17 RX ADMIN — SODIUM CHLORIDE, SODIUM LACTATE, POTASSIUM CHLORIDE, CALCIUM CHLORIDE: 600; 310; 30; 20 INJECTION, SOLUTION INTRAVENOUS at 14:59:00

## 2023-11-17 RX ADMIN — SODIUM CHLORIDE, SODIUM LACTATE, POTASSIUM CHLORIDE, CALCIUM CHLORIDE: 600; 310; 30; 20 INJECTION, SOLUTION INTRAVENOUS at 14:42:00

## 2023-11-17 RX ADMIN — EPHEDRINE SULFATE 10 MG: 50 INJECTION INTRAVENOUS at 15:10:00

## 2023-11-17 RX ADMIN — LIDOCAINE HYDROCHLORIDE 50 MG: 10 INJECTION, SOLUTION EPIDURAL; INFILTRATION; INTRACAUDAL; PERINEURAL at 14:43:00

## 2023-11-17 NOTE — H&P
History & Physical Examination    Patient Name: Rich Teran  MRN: [id number]  [facility]: [id number]  YOB: 1945    Diagnosis: history of colon polyps, surveillance colonoscopy    Medications Prior to Admission   Medication Sig Dispense Refill Last Dose    atorvastatin 20 MG Oral Tab Take 1 tablet (20 mg total) by mouth every evening. 90 tablet 3 11/15/2023    aspirin 81 MG Oral Tab EC Take 1 tablet (81 mg total) by mouth daily. 11/16/2023    omeprazole 20 MG Oral Capsule Delayed Release Take 1 capsule (20 mg total) by mouth every morning before breakfast.   11/16/2023    Vitamin D3 1000 units Oral Tab Take 1 tablet (1,000 Units total) by mouth daily.    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 pylori testing negative    High cholesterol     History of colon polyps     Hypercholesterolemia     Osteoarthritis of right knee     Status post right TKA 10-20    Varicose veins of both lower extremities     Visual impairment     readers     Past Surgical History:   Procedure Laterality Date    BIOPSY OF BREAST, NEEDLE CORE Left 01/24/2008    Fibroadenoma    BIOPSY OF BREAST, NEEDLE CORE Right 02/10/2021    cork clip    BIOPSY OF SKIN LESION  2018    upper mid back epidermal inclusion cyst    CATARACT Right 2019    CATARACT Left 2019    COLONOSCOPY      [facility]    EGD N/A 2016    Procedure: ESOPHAGOGASTRODUODENOSCOPY (EGD); Surgeon: Roula Whitley MD;  Location: [facility] ARTHROSCOPY Right     MASTECTOMY,SIMPLE Right 2021    SALPINGECTOMY Left     Ectopic pregnancy    SENT LYMPH NODE BIOPSY Right 2021    STAB PHLEBECTOMY, VARICOSE VEINS, 1 EXTREMITY; <20 INCISIONS Right     TOTAL KNEE REPLACEMENT Right 10/09/2020     Family History   Problem Relation Age of Onset    Colon Cancer Father          age 28    Hypertension Mother     Glaucoma Mother     Diabetes Sister     Hypertension Sister     Glaucoma Sister     Breast Cancer Niece 48    Cancer Niece         thyroid cancer    Breast Cancer Self 68    Macular degeneration Neg     Ovarian Cancer Neg      Social History     Tobacco Use    Smoking status: Former     Packs/day: 0.50     Years: 2.00     Additional pack years: 0.00     Total pack years: 1.00     Types: Cigarettes     Quit date: 1975     Years since quittin.0    Smokeless tobacco: Never   Substance Use Topics    Alcohol use: Yes     Comment: socially       SYSTEM Check if Review is Normal Check if Physical Exam is Normal If not normal, please explain:   HEENT Abdias.Sill ] [ Simi Carlos  Abdias.Sill ] [ Jennifer Buzzard Abdias.Sill ] [ Alexander Arms Abdias.Sill ] [ Shyrl Hero Abdias.Sill ] [ Karina Pals Abdias.Sill ] [ X]    OTHER        I have discussed the risks and benefits and alternatives of the procedure with the patient/family. They understand and agree to proceed with plan of care. I have reviewed the History and Physical done within the last 30 days. Any changes noted above.     Ronn Gerardo MD  [facility]

## 2023-11-17 NOTE — DISCHARGE INSTRUCTIONS

## 2023-11-17 NOTE — OPERATIVE REPORT
COLONOSCOPY REPORT    Aurea Zavaleta     1945 Age 77year old   PCP Eliu Yeager MD Endoscopist Kay Meyer MD       Date of procedure: 23    Procedure: Colonoscopy w/ cold snare polypectomy, cold forceps polypectomy. Pre-operative diagnosis: surveillance colonoscopy    Post-operative diagnosis: polyps, diverticulosis    Medications: MAC    Withdrawal time: 16 minutes    Procedure:  Informed consent was obtained from the patient after the risks of the procedure were discussed, including but not limited to bleeding, perforation, aspiration, infection, or possibility of a missed lesion. After discussions of the risks/benefits and alternatives to this procedure, as well as the planned sedation, the patient was placed in the left lateral decubitus position and begun on continuous blood pressure pulse oximetry and EKG monitoring and this was maintained throughout the procedure. Once an adequate level of sedation was obtained a digital rectal exam was completed. Then the lubricated tip of the [id number] diagnostic video colonoscope was inserted and advanced without difficulty to the cecum using water immersion and CO2 insufflation technique. The cecum was identified by localizing the trifold, the appendix and the ileocecal valve. Withdrawal was begun with thorough washing and careful examination of the colonic walls and folds. A routine second examination of the cecum/ascending colon was performed. Photodocumentation was obtained. The bowel prep was fair. Views of the colon were fair with washing. I then carefully withdrew the instrument from the patient who tolerated the procedure well. Complications: none. Findings:   1. 6 polyps noted as follows:      A. 5 mm polyp in the ascending colon; sessile morphology; cold snare polypectomy performed, polyp retrieved.       B. 2 mm polyp in the ascending colon; sessile morphology; cold forceps polypectomy performed, polyp retrieved. C. TWO -- 2 mm polyp in the transverse colon; sessile morphology; cold forceps polypectomy performed, polyps retrieved. D. TWO -- 2 mm polyps in the sigmoid colon; sessile morphology; cold forceps polypectomy performed, polyps retrieved. 2. Diverticulosis: large diverticula seen throughout the entire colon except the cecum and rectum. 3. Ileocecal valve appeared healthy and normal. Terminal ileum appeared normal.    4. The colonic mucosa throughout the colon showed normal vascular pattern, without evidence of angioectasias or inflammation. 5. A retroflexed view of the rectum revealed small internal hemorrhoids. 6. ILDA: normal rectal tone, no masses palpated. Impression:   6 small polyps resected. Normal terminal ileum. Diverticulosis. Hemorrhoids. The colon was otherwise normal with glistening mucosa and intact vascular pattern. Recommend:  Await pathology. The interval for the next colonoscopy will be determined after reviewing pathology. If new signs or symptoms develop, colonoscopy may need to be repeated sooner. High fiber diet. Monitor for blood in the stool. If having more than just tinge of blood, call office or go to the ER. Consider colonoscopy in 3 years.    >>>If tissue was obtained and you have not received your pathology results either by phone or letter within 2 weeks, please call our office at [phone].     Specimens: polyps    Blood loss: <1 ml

## 2023-11-20 NOTE — PROGRESS NOTES
6 adenomas removed on recent colonoscopy. She is currently 77years old. Given this, can follow up in clinic in 3 years to discuss surveillance colonoscopy. I called the patient and explained the above, she expressed understanding. GI Staff:    Can you please place recall for this patient to be seen in clinic to discuss colonoscopy in 3 years. Thank you.     Mirella Holliday MD

## 2023-11-21 ENCOUNTER — TELEPHONE (OUTPATIENT)
Facility: CLINIC | Age: 78
End: 2023-11-21

## 2023-11-21 RX ORDER — ATORVASTATIN CALCIUM 20 MG/1
20 TABLET, FILM COATED ORAL EVERY EVENING
Qty: 90 TABLET | Refills: 3 | Status: SHIPPED | OUTPATIENT
Start: 2023-11-21

## 2023-11-21 NOTE — TELEPHONE ENCOUNTER
----- Message from Melina Castillo MD sent at 11/20/2023  3:48 PM CST -----  6 adenomas removed on recent colonoscopy. She is currently 77years old. Given this, can follow up in clinic in 3 years to discuss surveillance colonoscopy. I called the patient and explained the above, she expressed understanding. GI Staff:    Can you please place recall for this patient to be seen in clinic to discuss colonoscopy in 3 years. Thank you.     Melina Castillo MD

## 2023-11-21 NOTE — TELEPHONE ENCOUNTER
90 DAY SUPPLY      Current Outpatient Medications:     atorvastatin 20 MG Oral Tab, Take 1 tablet (20 mg total) by mouth every evening., Disp: 90 tablet, Rfl: 3

## 2023-11-21 NOTE — TELEPHONE ENCOUNTER
Health maintenance updated.     Message sent to patient outreach to call patient in 3 years to set up f/u appointment in 3 years per Dr. Elza Appiah

## 2023-12-07 ENCOUNTER — MED REC SCAN ONLY (OUTPATIENT)
Facility: CLINIC | Age: 78
End: 2023-12-07

## 2024-02-23 ENCOUNTER — HOSPITAL ENCOUNTER (OUTPATIENT)
Dept: GENERAL RADIOLOGY | Facility: HOSPITAL | Age: 79
Discharge: HOME | End: 2024-02-23
Attending: PODIATRIST
Payer: MEDICARE

## 2024-02-23 ENCOUNTER — OFFICE VISIT (OUTPATIENT)
Dept: PODIATRY CLINIC | Facility: CLINIC | Age: 79
End: 2024-02-23

## 2024-02-23 DIAGNOSIS — M79.89 PAIN AND SWELLING OF TOE OF LEFT FOOT: Primary | ICD-10-CM

## 2024-02-23 DIAGNOSIS — M79.89 PAIN AND SWELLING OF TOE OF LEFT FOOT: ICD-10-CM

## 2024-02-23 DIAGNOSIS — M79.675 PAIN AND SWELLING OF TOE OF LEFT FOOT: ICD-10-CM

## 2024-02-23 DIAGNOSIS — M79.675 PAIN AND SWELLING OF TOE OF LEFT FOOT: Primary | ICD-10-CM

## 2024-02-23 PROCEDURE — 73630 X-RAY EXAM OF FOOT: CPT | Performed by: PODIATRIST

## 2024-02-23 PROCEDURE — 1126F AMNT PAIN NOTED NONE PRSNT: CPT | Performed by: PODIATRIST

## 2024-02-23 PROCEDURE — 1159F MED LIST DOCD IN RCRD: CPT | Performed by: PODIATRIST

## 2024-02-23 PROCEDURE — 99213 OFFICE O/P EST LOW 20 MIN: CPT | Performed by: PODIATRIST

## 2024-02-23 NOTE — PROGRESS NOTES
[facility] Podiatry  Progress Note    Kendal Abrams is a 78 year old female.   Chief Complaint   Patient presents with    Toe Pain     L 2nd and 3rd s/p sx on 8/10/2023- rates pain 8-9/10 on and off- has swelling         HPI:     This is a pleasant female with CKD stage 3, aortic atherosclerosis, history of DVT Right LE, and PMH of former tobacco use.  She is on baby aspirin.      She has PMH of left 2nd and 3rd toe arthroplasty in 2023.      She complains of pain and swelling to the left 2nd and 3rd toes which started a few months ago.  She denies any injury.  She states it is like a shooting/nerve pain.        Allergies: Patient has no known allergies.   Current Outpatient Medications   Medication Sig Dispense Refill    atorvastatin 20 MG Oral Tab Take 1 tablet (20 mg total) by mouth every evening. 90 tablet 3    aspirin 81 MG Oral Tab EC Take 1 tablet (81 mg total) by mouth daily.      omeprazole 20 MG Oral Capsule Delayed Release Take 1 capsule (20 mg total) by mouth every morning before breakfast.      Vitamin D3 1000 units Oral Tab Take 1 tablet (1,000 Units total) by mouth daily.        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 pylori testing negative    High cholesterol     History of colon polyps     Hypercholesterolemia     Osteoarthritis of right  knee     Status post right TKA 10-20    Varicose veins of both lower extremities     Visual impairment     readers      Past Surgical History:   Procedure Laterality Date    BIOPSY OF BREAST, NEEDLE CORE Left 2008    Fibroadenoma    BIOPSY OF BREAST, NEEDLE CORE Right 02/10/2021    cork clip    BIOPSY OF SKIN LESION  2018    upper mid back epidermal inclusion cyst    CATARACT Right 2019    CATARACT Left 2019    COLONOSCOPY      [facility]    COLONOSCOPY N/A 2023    Procedure: COLONOSCOPY;  Surgeon: Hugo Torres MD;  Location: [facility] ENDOSCOPY    EGD N/A 2016    Procedure: ESOPHAGOGASTRODUODENOSCOPY (EGD);  Surgeon: Lindsay Solorzano MD;  Location: [facility] ENDOSCOPY    HEMORRHOIDECTOMY      KNEE ARTHROSCOPY Right     MASTECTOMY,SIMPLE Right 2021    SALPINGECTOMY Left     Ectopic pregnancy    SENT LYMPH NODE BIOPSY Right 2021    STAB PHLEBECTOMY, VARICOSE VEINS, 1 EXTREMITY; <20 INCISIONS Right     TOTAL KNEE REPLACEMENT Right 10/09/2020      Family History   Problem Relation Age of Onset    Colon Cancer Father          age 35    Hypertension Mother     Glaucoma Mother     Diabetes Sister     Hypertension Sister     Glaucoma Sister     Breast Cancer Niece 50    Cancer Niece         thyroid cancer    Breast Cancer Self 76    Macular degeneration Neg     Ovarian Cancer Neg       Social History     Socioeconomic History    Marital status: Single    Number of children: 0   Occupational History    Occupation: Nurse - dialysis, [place]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      EXAM:   There were no vitals taken for this visit.    Constitutional:   Patient in no apparent distress. Well kept Of normal body habitus. Alert and oriented to person, place, and time.  Integumentary examination:   There are no varicosities.   Skin appears moist, warm, and supple with positive hair growth.     Vascular examination:   DP pulse is 2/4  PT pulse is 2/4  Capillary refill is immediate    Mild edema to left 2nd and 3rd toes    Neurological examination:   Vibratory (128-Hz tuning fork) sensation is present to right and is present to left.  Sharp/dull is present to right and is present to left.  Musculoskeletal examination:  Muscle Strength is 5/5.    Left 2nd and 3rd toes are rectus with no POP      LABS & IMAGING:     Lab Results   Component Value Date     (H) 06/24/2023    BUN 14 06/24/2023    CREATSERUM 1.19 (H) 06/24/2023    BUNCREA 11.8 06/24/2023    ANIONGAP 5 06/24/2023    GFRAA 44 (L) 06/20/2022    GFRNAA 38 (L) 06/20/2022    CA 8.6 06/24/2023     06/24/2023    K 4.1 06/24/2023     06/24/2023    CO2 26.0 06/24/2023    OSMOCALC 298 (H) 06/24/2023        No results found for: \"EAG\", \"A1C\"     No results found.     ASSESSMENT AND PLAN:   Diagnoses and all orders for this visit:    Pain and swelling of toe of left foot                Plan:     Evaluated Left 2nd and 3rd toes which are rectus in nature with mild residual edema    I explained to pt that her left toe pain is most likely due to the residual edema.  There is no pain on physical examination.      She is wearing narrow dress shoes and I recommended that she wear good supportive NB tennis shoes to prevent compression across the toes.      Left foot xrays: 8/18/23 revealed rectus nature of toes 2 and 3 at the PIPJ    Ordered new left foot xrays    RTC 4 weeks and will review new  xrays        No follow-ups on file.    Jose Muniz, ROYCE  2/23/24

## 2024-02-26 ENCOUNTER — TELEPHONE (OUTPATIENT)
Dept: INTERNAL MEDICINE CLINIC | Facility: CLINIC | Age: 79
End: 2024-02-26

## 2024-02-26 ENCOUNTER — TELEPHONE (OUTPATIENT)
Dept: CASE MANAGEMENT | Age: 79
End: 2024-02-26

## 2024-02-26 DIAGNOSIS — H26.9 CATARACT, UNSPECIFIED CATARACT TYPE, UNSPECIFIED LATERALITY: Primary | ICD-10-CM

## 2024-02-26 DIAGNOSIS — D05.11 DUCTAL CARCINOMA IN SITU (DCIS) OF RIGHT BREAST: Primary | ICD-10-CM

## 2024-02-26 DIAGNOSIS — Z01.00 ENCOUNTER FOR COMPLETE EYE EXAM: ICD-10-CM

## 2024-02-26 NOTE — TELEPHONE ENCOUNTER
Dr. Lambert,     Patient called requesting referral to Dr. Berumen.    Pended referral please review diagnosis and sign off if you agree.    Thank you.  Belinda De Los Santos  [facility] Care

## 2024-02-26 NOTE — TELEPHONE ENCOUNTER
Dr. Lambert,     Patient called requesting referral to Dr. Rosales.    Pended referral please review diagnosis and sign off if you agree.    Thank you.  Belinda De Los Santos  [facility]

## 2024-02-26 NOTE — TELEPHONE ENCOUNTER
Patient is requesting referral.     Name of specialist and specialty department : Dr.Sheridan Berumen/Opthalmology  Reason for visit with the specialist: follow up after cataract surgery/  Address of the specialist office: [facility] [address] [place], [place]   Appointment date: 3-27-24         CSS informed patient the turnaround time for referral is 5-7 business days.  Patient was informed to [facility] for referral status.

## 2024-03-25 ENCOUNTER — OFFICE VISIT (OUTPATIENT)
Dept: PODIATRY CLINIC | Facility: CLINIC | Age: 79
End: 2024-03-25

## 2024-03-25 DIAGNOSIS — M79.675 PAIN AND SWELLING OF TOE OF LEFT FOOT: Primary | ICD-10-CM

## 2024-03-25 DIAGNOSIS — M79.89 PAIN AND SWELLING OF TOE OF LEFT FOOT: Primary | ICD-10-CM

## 2024-03-25 PROCEDURE — 1159F MED LIST DOCD IN RCRD: CPT | Performed by: PODIATRIST

## 2024-03-25 PROCEDURE — 99213 OFFICE O/P EST LOW 20 MIN: CPT | Performed by: PODIATRIST

## 2024-03-25 NOTE — PROGRESS NOTES
[facility] Podiatry  Progress Note    Kendal Abrams is a 79 year old female.   Chief Complaint   Patient presents with    Foot Pain     F/u  left foot Pain 5/10 and swelling. 2nd and 3rd Hx of sx on 8/10/2023.         HPI:     This is a pleasant female with CKD stage 3, aortic atherosclerosis, history of DVT Right LE, and PMH of former tobacco use.  She is on baby aspirin.      She has PMH of left 2nd and 3rd toe arthroplasty in 2023.      She complains of pain and swelling to the left 2nd and 3rd toes f/u.  She denies any injury.  She states it is like a shooting/nerve pain.  She does note improvement since the last visit.        Allergies: Patient has no known allergies.   Current Outpatient Medications   Medication Sig Dispense Refill    atorvastatin 20 MG Oral Tab Take 1 tablet (20 mg total) by mouth every evening. 90 tablet 3    aspirin 81 MG Oral Tab EC Take 1 tablet (81 mg total) by mouth daily.      omeprazole 20 MG Oral Capsule Delayed Release Take 1 capsule (20 mg total) by mouth every morning before breakfast.      Vitamin D3 1000 units Oral Tab Take 1 tablet (1,000 Units total) by mouth daily.        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 pylori testing negative    High cholesterol     History of colon polyps     Hypercholesterolemia      Osteoarthritis of right knee     Status post right TKA 10-20    Varicose veins of both lower extremities     Visual impairment     readers      Past Surgical History:   Procedure Laterality Date    BIOPSY OF BREAST, NEEDLE CORE Left 2008    Fibroadenoma    BIOPSY OF BREAST, NEEDLE CORE Right 02/10/2021    cork clip    BIOPSY OF SKIN LESION  2018    upper mid back epidermal inclusion cyst    CATARACT Right 2019    CATARACT Left 2019    COLONOSCOPY      [facility]    COLONOSCOPY N/A 2023    Procedure: COLONOSCOPY;  Surgeon: Hugo Torres MD;  Location: [facility] ENDOSCOPY    EGD N/A 2016    Procedure: ESOPHAGOGASTRODUODENOSCOPY (EGD);  Surgeon: Lindsay Solorzano MD;  Location: [facility] ENDOSCOPY    HEMORRHOIDECTOMY      KNEE ARTHROSCOPY Right     MASTECTOMY,SIMPLE Right 2021    SALPINGECTOMY Left     Ectopic pregnancy    SENT LYMPH NODE BIOPSY Right 2021    STAB PHLEBECTOMY, VARICOSE VEINS, 1 EXTREMITY; <20 INCISIONS Right     TOTAL KNEE REPLACEMENT Right 10/09/2020      Family History   Problem Relation Age of Onset    Colon Cancer Father          age 35    Hypertension Mother     Glaucoma Mother     Diabetes Sister     Hypertension Sister     Glaucoma Sister     Breast Cancer Niece 50    Cancer Niece         thyroid cancer    Breast Cancer Self 76    Macular degeneration Neg     Ovarian Cancer Neg       Social History     Socioeconomic History    Marital status: Single    Number of children: 0   Occupational History    Occupation: Nurse - dialysis, OR     Comment: retired   Tobacco Use    Smoking status: Former     Packs/day: 0.50     Years: 2.00     Additional pack years: 0.00     Total pack years: 1.00     Types: Cigarettes     Quit date: 1975     Years since quittin.3    Smokeless tobacco: Never   Vaping Use    Vaping Use: Never used   Substance and Sexual Activity    Alcohol use: Yes     Comment: socially    Drug use: No            REVIEW OF SYSTEMS:   Denies nausea, fever, chills  No calf pain  No other muscle or joint aches  Denies chest pain or shortness of breath.      EXAM:   There were no vitals taken for this visit.    Constitutional:   Patient in no apparent distress. Well kept Of normal body habitus. Alert and oriented to person, place, and time.  Integumentary examination:   There are no varicosities.   Skin appears moist, warm, and supple with positive hair growth.     Vascular examination:   DP pulse is 2/4  PT pulse is 2/4  Capillary refill is immediate    Mild edema to left 2nd and 3rd toes, improved    Neurological examination:   Vibratory (128-Hz tuning fork) sensation is present to right and is present to left.  Sharp/dull is present to right and is present to left.  Musculoskeletal examination:  Muscle Strength is 5/5.    Left 2nd and 3rd toes are rectus with no POP      LABS & IMAGING:     Lab Results   Component Value Date     (H) 06/24/2023    BUN 14 06/24/2023    CREATSERUM 1.19 (H) 06/24/2023    BUNCREA 11.8 06/24/2023    ANIONGAP 5 06/24/2023    GFRAA 44 (L) 06/20/2022    GFRNAA 38 (L) 06/20/2022    CA 8.6 06/24/2023     06/24/2023    K 4.1 06/24/2023     06/24/2023    CO2 26.0 06/24/2023    OSMOCALC 298 (H) 06/24/2023        No results found for: \"EAG\", \"A1C\"     No results found.     ASSESSMENT AND PLAN:   Diagnoses and all orders for this visit:    Pain and swelling of toe of left foot                  Plan:     Evaluated Left 2nd and 3rd toes which are rectus in nature with mild residual edema    I explained to pt that her left toe pain is most likely due to the residual edema.  There is no pain on physical examination.      Pt to cont wearing good supportive NB tennis shoes to prevent compression across the toes.      Xray left foot full WB: 2/23/24: 1. No acute fracture or dislocation.   2. Degenerative changes 1st MTP joint and DIP joint great toe.   3. Prior osteotomy/partial fusion  of the 2nd and 3rd PIP joints.         RTC PRN        No follow-ups on file.    Jose Muniz DPM  3/25/24

## 2024-04-03 ENCOUNTER — HOSPITAL ENCOUNTER (OUTPATIENT)
Dept: MAMMOGRAPHY | Facility: HOSPITAL | Age: 79
Discharge: HOME | End: 2024-04-03
Attending: INTERNAL MEDICINE
Payer: MEDICARE

## 2024-04-03 DIAGNOSIS — Z12.31 BREAST CANCER SCREENING BY MAMMOGRAM: ICD-10-CM

## 2024-04-03 PROCEDURE — 77063 BREAST TOMOSYNTHESIS BI: CPT | Performed by: INTERNAL MEDICINE

## 2024-04-03 PROCEDURE — 77067 SCR MAMMO BI INCL CAD: CPT | Performed by: INTERNAL MEDICINE

## 2024-04-10 ENCOUNTER — OFFICE VISIT (OUTPATIENT)
Dept: HEMATOLOGY/ONCOLOGY | Facility: HOSPITAL | Age: 79
End: 2024-04-10
Attending: INTERNAL MEDICINE
Payer: MEDICARE

## 2024-04-10 VITALS
WEIGHT: 186 LBS | TEMPERATURE: 98 F | SYSTOLIC BLOOD PRESSURE: 134 MMHG | DIASTOLIC BLOOD PRESSURE: 64 MMHG | HEIGHT: 67 IN | HEART RATE: 51 BPM | RESPIRATION RATE: 16 BRPM | BODY MASS INDEX: 29.19 KG/M2 | OXYGEN SATURATION: 100 %

## 2024-04-10 DIAGNOSIS — Z90.11 H/O RIGHT MASTECTOMY: ICD-10-CM

## 2024-04-10 DIAGNOSIS — Z86.000 HISTORY OF DUCTAL CARCINOMA IN SITU (DCIS) OF BREAST: Primary | ICD-10-CM

## 2024-04-10 PROCEDURE — 99213 OFFICE O/P EST LOW 20 MIN: CPT | Performed by: INTERNAL MEDICINE

## 2024-04-10 NOTE — PROGRESS NOTES
Cancer Center Progress Note    Patient Name: Kendal Abrams   YOB: 1945   Medical Record Number: [id number]   Attending Physician: Frederic Rosales M.D.     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 Bueno March 2021    She returns for routine follow-up and to review pathology.  She is feeling well overall.  She is recovered well from surgery.  Denies any bone pain denies any new breast masses denies any adenopathy she is active and up in her activities of daily living      Performance Status:  ECOG 0  Past Medical History:  Past Medical History:    Aortic atherosclerosis (HCC)    CXR 1-18    Breast CA (HCC)    right dcis 2021    Cataract    Chronic kidney disease, stage III (moderate) (HCC)    Deep vein thrombosis (HCC)    RLE, treated with Coumadin for 6 months    Ductal carcinoma in situ (DCIS) of right breast    Ductal carcinoma in situ, low nuclear grade, solid and cribriform type, with focal central necrosis and associated microcalcifications, multifocal, largest focus is 4.5 mm in greatest dimension; s/p right total mastectomy and right sentinel lymph node biopsy, right lymphoscintigraphy 3-21    GERD (gastroesophageal reflux disease)    EGD 11-16 with hiatal hernia and gastritis with biopsies benign and H. pylori testing negative    High cholesterol    History of colon polyps    Hypercholesterolemia    Osteoarthritis of right knee    Status post right TKA 10-20    Varicose veins of both lower extremities    Visual impairment    readers       Past Surgical History:  Past Surgical History:   Procedure Laterality Date    Biopsy of breast, needle core Left 01/24/2008    Fibroadenoma    Biopsy of breast, needle core Right 02/10/2021    cork clip    Biopsy of skin lesion  06/20/2018    upper mid back epidermal inclusion cyst    Cataract Right 02/2019    Cataract  Left 2019    Colonoscopy      [facility]    Colonoscopy N/A 2023    Procedure: COLONOSCOPY;  Surgeon: Hugo Torres MD;  Location: [facility] ENDOSCOPY    Egd N/A 2016    Procedure: ESOPHAGOGASTRODUODENOSCOPY (EGD);  Surgeon: Lindsay Solorzano MD;  Location: [facility] ENDOSCOPY    Hemorrhoidectomy      Knee arthroscopy Right     Mastectomy right      Mastectomy,simple Right 2021    Salpingectomy Left     Ectopic pregnancy    Sent lymph node biopsy Right 2021    Stab phlebectomy, varicose veins, 1 extremity; <20 incisions Right     Total knee replacement Right 10/09/2020       Family History:  Family History   Problem Relation Age of Onset    Colon Cancer Father          age 35    Hypertension Mother     Glaucoma Mother     Diabetes Sister     Hypertension Sister     Glaucoma Sister     Breast Cancer Niece 50    Cancer Niece         thyroid cancer    Breast Cancer Self 77    Macular degeneration Neg     Ovarian Cancer Neg        Social History:  Social History     Socioeconomic History    Marital status: Single    Number of children: 0   Occupational History    Occupation: Nurse - dialysis, OR     Comment: retired   Tobacco Use    Smoking status: Former     Current packs/day: 0.00     Average packs/day: 0.5 packs/day for 2.0 years (1.0 ttl pk-yrs)     Types: Cigarettes     Start date: 1973     Quit date: 1975     Years since quittin.4    Smokeless tobacco: Never   Vaping Use    Vaping status: Never Used   Substance and Sexual Activity    Alcohol use: Yes     Comment: socially    Drug use: No         Current Medications:    Current Outpatient Medications:     atorvastatin 20 MG Oral Tab, Take 1 tablet (20 mg total) by mouth every evening., Disp: 90 tablet, Rfl: 3    aspirin 81 MG Oral Tab EC, Take 1 tablet (81 mg total) by mouth daily., Disp: , Rfl:     omeprazole 20 MG Oral Capsule Delayed Release, Take 1 capsule (20 mg total) by mouth  every morning before breakfast., Disp: , Rfl:     Vitamin D3 1000 units Oral Tab, Take 1 tablet (1,000 Units total) by mouth daily., Disp: , Rfl:     Current Outpatient Medications on File Prior to Visit   Medication Sig Dispense Refill    atorvastatin 20 MG Oral Tab Take 1 tablet (20 mg total) by mouth every evening. 90 tablet 3    aspirin 81 MG Oral Tab EC Take 1 tablet (81 mg total) by mouth daily.      omeprazole 20 MG Oral Capsule Delayed Release Take 1 capsule (20 mg total) by mouth every morning before breakfast.      Vitamin D3 1000 units Oral Tab Take 1 tablet (1,000 Units total) by mouth daily.       No current facility-administered medications on file prior to visit.         Allergies:  No Known Allergies     Review of Systems:  All other systems reviewed and negative x12    Vital Signs:  /64 (BP Location: Left arm, Patient Position: Sitting, Cuff Size: large)   Pulse 51   Temp 97.8 °F (36.6 °C) (Oral)   Resp 16   Ht 1.702 m (5' 7\")   Wt 84.4 kg (186 lb)   SpO2 100%   BMI 29.13 kg/m²     Physical Examination:  General: Patient is alert and oriented x 3, not in acute distress.  Psych:  Mood and affect appropriate  HEENT: EOMs intact. PERRL. Oropharynx is clear.   Neck: No JVD. No palpable lymphadenopathy. Neck is supple.  Lymphatics: There is no palpable peripheral lymphadenopathy   Chest: Symmetric expansion, nonlabored breathing  Cardiovascular: Regular with palpable distal pulses   Abdomen: Soft, non tender.   Extremities: No edema.  Neurological: 5/5 motor x4.        Laboratory:  No results for input(s): \"WBC\", \"HGB\", \"PLT\", \"NE\", \"NEUT\" in the last 504 hours.      Lab Results   Component Value Date     (H) 06/24/2023    BUN 14 06/24/2023    BUNCREA 11.8 06/24/2023    CREATSERUM 1.19 (H) 06/24/2023    ANIONGAP 5 06/24/2023    GFRNAA 38 (L) 06/20/2022    GFRAA 44 (L) 06/20/2022    CA 8.6 06/24/2023    OSMOCALC 298 (H) 06/24/2023    ALKPHO 54 (L) 06/24/2023    AST 19 06/24/2023    ALT  23 06/24/2023    ALKPHOS 53 08/29/2016    BILT 0.8 06/24/2023    TP 7.1 06/24/2023    ALB 3.5 06/24/2023    GLOBULIN 3.6 06/24/2023    AGRATIO 1.1 08/29/2016     06/24/2023    K 4.1 06/24/2023     06/24/2023    CO2 26.0 06/24/2023       Radiology:  Rachell left  CONCLUSION:       BI-RADS CATEGORY:     DIAGNOSTIC CATEGORY 2--BENIGN FINDING:         RECOMMENDATIONS:   ROUTINE MAMMOGRAM AND CLINICAL EVALUATION IN 12 MONTHS.           Cancer Staging   No matching staging information was found for the patient.      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 Bueno March 2021  -No indication for any adjuvant treatment  -previous abnormal left rachell. Will get yesterday's results.  next rachell screening 12 months left     Can follow up as needed with oncology.  We are happy to see the patient back as needed at any point.     Data: Mammogram cbc cmp    Frederic Rosales MD

## 2024-06-26 ENCOUNTER — HOSPITAL ENCOUNTER (OUTPATIENT)
Dept: GENERAL RADIOLOGY | Facility: HOSPITAL | Age: 79
Discharge: HOME | End: 2024-06-26
Attending: INTERNAL MEDICINE
Payer: MEDICARE

## 2024-06-26 ENCOUNTER — OFFICE VISIT (OUTPATIENT)
Dept: INTERNAL MEDICINE CLINIC | Facility: CLINIC | Age: 79
End: 2024-06-26

## 2024-06-26 ENCOUNTER — LAB ENCOUNTER (OUTPATIENT)
Dept: LAB | Facility: HOSPITAL | Age: 79
End: 2024-06-26
Attending: INTERNAL MEDICINE
Payer: MEDICARE

## 2024-06-26 VITALS
HEIGHT: 67 IN | WEIGHT: 183.63 LBS | BODY MASS INDEX: 28.82 KG/M2 | SYSTOLIC BLOOD PRESSURE: 117 MMHG | HEART RATE: 53 BPM | DIASTOLIC BLOOD PRESSURE: 69 MMHG

## 2024-06-26 DIAGNOSIS — Z00.00 ANNUAL PHYSICAL EXAM: Primary | ICD-10-CM

## 2024-06-26 DIAGNOSIS — E04.2 MULTINODULAR GOITER: ICD-10-CM

## 2024-06-26 DIAGNOSIS — I70.0 AORTIC ATHEROSCLEROSIS (HCC): ICD-10-CM

## 2024-06-26 DIAGNOSIS — Z00.00 ENCOUNTER FOR ANNUAL HEALTH EXAMINATION: ICD-10-CM

## 2024-06-26 DIAGNOSIS — K21.9 GASTROESOPHAGEAL REFLUX DISEASE WITHOUT ESOPHAGITIS: ICD-10-CM

## 2024-06-26 DIAGNOSIS — M25.531 RIGHT WRIST PAIN: ICD-10-CM

## 2024-06-26 DIAGNOSIS — N18.30 STAGE 3 CHRONIC KIDNEY DISEASE, UNSPECIFIED WHETHER STAGE 3A OR 3B CKD (HCC): ICD-10-CM

## 2024-06-26 DIAGNOSIS — Z85.3 HISTORY OF BREAST CANCER: ICD-10-CM

## 2024-06-26 DIAGNOSIS — Z86.010 HISTORY OF COLON POLYPS: ICD-10-CM

## 2024-06-26 DIAGNOSIS — Z00.00 ANNUAL PHYSICAL EXAM: ICD-10-CM

## 2024-06-26 DIAGNOSIS — E78.00 HYPERCHOLESTEROLEMIA: ICD-10-CM

## 2024-06-26 LAB
ALBUMIN SERPL-MCNC: 4.3 G/DL (ref 3.2–4.8)
ALBUMIN/GLOB SERPL: 1.4 {RATIO} (ref 1–2)
ALP LIVER SERPL-CCNC: 59 U/L
ALT SERPL-CCNC: 14 U/L
ANION GAP SERPL CALC-SCNC: 7 MMOL/L (ref 0–18)
AST SERPL-CCNC: 21 U/L (ref ?–34)
BILIRUB SERPL-MCNC: 0.9 MG/DL (ref 0.2–1.1)
BUN BLD-MCNC: 20 MG/DL (ref 9–23)
BUN/CREAT SERPL: 13.4 (ref 10–20)
CALCIUM BLD-MCNC: 9.2 MG/DL (ref 8.7–10.4)
CHLORIDE SERPL-SCNC: 109 MMOL/L (ref 98–112)
CHOLEST SERPL-MCNC: 250 MG/DL (ref ?–200)
CO2 SERPL-SCNC: 25 MMOL/L (ref 21–32)
CREAT BLD-MCNC: 1.49 MG/DL
DEPRECATED RDW RBC AUTO: 43.3 FL (ref 35.1–46.3)
EGFRCR SERPLBLD CKD-EPI 2021: 35 ML/MIN/1.73M2 (ref 60–?)
ERYTHROCYTE [DISTWIDTH] IN BLOOD BY AUTOMATED COUNT: 13.2 % (ref 11–15)
FASTING PATIENT LIPID ANSWER: YES
FASTING STATUS PATIENT QL REPORTED: YES
GLOBULIN PLAS-MCNC: 3 G/DL (ref 2–3.5)
GLUCOSE BLD-MCNC: 96 MG/DL (ref 70–99)
HCT VFR BLD AUTO: 38.6 %
HDLC SERPL-MCNC: 56 MG/DL (ref 40–59)
HGB BLD-MCNC: 13.1 G/DL
LDLC SERPL CALC-MCNC: 178 MG/DL (ref ?–100)
MCH RBC QN AUTO: 30.7 PG (ref 26–34)
MCHC RBC AUTO-ENTMCNC: 33.9 G/DL (ref 31–37)
MCV RBC AUTO: 90.4 FL
NONHDLC SERPL-MCNC: 194 MG/DL (ref ?–130)
OSMOLALITY SERPL CALC.SUM OF ELEC: 294 MOSM/KG (ref 275–295)
PLATELET # BLD AUTO: 195 10(3)UL (ref 150–450)
POTASSIUM SERPL-SCNC: 4.6 MMOL/L (ref 3.5–5.1)
PROT SERPL-MCNC: 7.3 G/DL (ref 5.7–8.2)
RBC # BLD AUTO: 4.27 X10(6)UL
SODIUM SERPL-SCNC: 141 MMOL/L (ref 136–145)
TRIGL SERPL-MCNC: 94 MG/DL (ref 30–149)
TSI SER-ACNC: 0.84 MIU/ML (ref 0.55–4.78)
VLDLC SERPL CALC-MCNC: 19 MG/DL (ref 0–30)
WBC # BLD AUTO: 4.8 X10(3) UL (ref 4–11)

## 2024-06-26 PROCEDURE — 80053 COMPREHEN METABOLIC PANEL: CPT

## 2024-06-26 PROCEDURE — 36415 COLL VENOUS BLD VENIPUNCTURE: CPT

## 2024-06-26 PROCEDURE — 73100 X-RAY EXAM OF WRIST: CPT | Performed by: INTERNAL MEDICINE

## 2024-06-26 PROCEDURE — 85027 COMPLETE CBC AUTOMATED: CPT

## 2024-06-26 PROCEDURE — 80061 LIPID PANEL: CPT

## 2024-06-26 PROCEDURE — 84443 ASSAY THYROID STIM HORMONE: CPT

## 2024-06-26 PROCEDURE — 73140 X-RAY EXAM OF FINGER(S): CPT | Performed by: INTERNAL MEDICINE

## 2024-06-26 RX ORDER — TRAMADOL HYDROCHLORIDE 50 MG/1
50 TABLET ORAL EVERY 8 HOURS PRN
Qty: 20 TABLET | Refills: 0 | Status: SHIPPED | OUTPATIENT
Start: 2024-06-26

## 2024-06-26 NOTE — PROGRESS NOTES
Subjective:   Kendal Abrams is an 79 year old female who presents this morning for an MA (Medicare Advantage) Supervisit (Once per calendar year) and scheduled follow up of multiple significant but stable problems including prior breast cancer, hypercholesterolemia and chronic kidney disease stage III    Her last visit was for a Medicare physical June 2023.  She lost her balance and fell yesterday, injuring her right wrist and right thumb.  She has severe pain in her right wrist and right thumb, but did not go to the ER or Immediate Care because of her visit today.  She is wearing a brace on her right wrist and hand today, and requests pain medication.  Otherwise she has been feeling well.  No other issues for discussion today.    In terms of her prior breast cancer, she follows annually with Oncology.  Left mammogram April 2024 BI-RADS 2. In terms of her hypercholesterolemia, diet is healthy and she exercises 2 days weekly, light aerobics and bike.  Weight down 4 pounds since physical June 2023.  Up-to-date on immunizations.  Next colonoscopy due November 2026.    History/Other:   Fall Risk Assessment:   She has been screened for Falls and is low risk.      Cognitive Assessment:   She had a completely normal cognitive assessment - see flowsheet entries     Functional Ability/Status:   Kendal Abrams has a completely normal functional assessment. See flowsheet for details.      Depression Screening (PHQ-2/PHQ-9): PHQ-2 SCORE: 0  , done 6/26/2024   Last [place] Suicide Screening on 6/26/2024 was No Risk.     Advanced Directives:   She does have a Living Will but we do NOT have it on file in Epic.    She does have a POA but we do NOT have it on file in Epic.    Not discussed      Patient Active Problem List   Diagnosis    Hx of colonic polyps    Family history of colon cancer    Hypercholesterolemia    GERD (gastroesophageal reflux disease)    Chronic kidney disease, stage III (moderate) (HCC)    Aortic  atherosclerosis (HCC)    Vitamin D deficiency    Multinodular goiter    Renal cyst    History of DVT (deep vein thrombosis)    Former tobacco use    Osteoarthritis of right knee    Primary osteoarthritis of right knee    Orthopedic aftercare    Ductal carcinoma in situ (DCIS) of right breast    Breast cancer (HCC)    Left knee pain     Allergies:  She has No Known Allergies.    Current Medications:  Outpatient Medications Marked as Taking for the 6/26/24 encounter (Office Visit) with Kwasi Lambert MD   Medication Sig    atorvastatin 20 MG Oral Tab Take 1 tablet (20 mg total) by mouth every evening.    aspirin 81 MG Oral Tab EC Take 1 tablet (81 mg total) by mouth daily.    omeprazole 20 MG Oral Capsule Delayed Release Take 1 capsule (20 mg total) by mouth every morning before breakfast.    Vitamin D3 1000 units Oral Tab Take 1 tablet (1,000 Units total) by mouth daily.       Medical History:  She  has a past medical history of Aortic atherosclerosis (HCC), Breast CA (HCC), Chronic kidney disease, stage III (moderate) (HCC), Deep vein thrombosis (HCC) (2005), Ductal carcinoma in situ (DCIS) of right breast (02/10/2021), GERD (gastroesophageal reflux disease), History of colon polyps, Hypercholesterolemia, Osteoarthritis of right knee, Varicose veins of both lower extremities, and Visual impairment.  Surgical History:  She  has a past surgical history that includes hemorrhoidectomy (1985); colonoscopy (2014); egd (N/A, 11/17/2016); knee arthroscopy (Right, 1997); salpingectomy (Left, 1995); stab phlebectomy, varicose veins, 1 extremity; <20 incisions (Right); biopsy of breast, needle core (Left, 01/24/2008); biopsy of skin lesion (06/20/2018); cataract (Right, 02/2019); cataract (Left, 03/2019); total knee replacement (Right, 10/09/2020); biopsy of breast, needle core (Right, 02/10/2021); sent lymph node biopsy (Right, 03/09/2021); mastectomy,simple (Right, 03/09/2021); colonoscopy (N/A, 11/17/2023); and mastectomy  right.   Family History:  Her family history includes Breast Cancer (age of onset: 50) in her niece; Breast Cancer (age of onset: 77) in her self; Cancer in her niece; Colon Cancer in her father; Diabetes in her sister; Glaucoma in her mother and sister; Hypertension in her mother and sister.  Social History:  She  reports that she quit smoking about 48 years ago. Her smoking use included cigarettes. She started smoking about 50 years ago. She has a 1 pack-year smoking history. She has never used smokeless tobacco. She reports current alcohol use. She reports that she does not use drugs.    Tobacco:  She smoked tobacco in the past but quit greater than 12 months ago.  Social History     Tobacco Use   Smoking Status Former    Current packs/day: 0.00    Average packs/day: 0.5 packs/day for 2.0 years (1.0 ttl pk-yrs)    Types: Cigarettes    Start date: 1973    Quit date: 1975    Years since quittin.6   Smokeless Tobacco Never        CAGE Alcohol Screen:   CAGE screening score of 0 on 2024, showing low risk of alcohol abuse.      Patient Care Team:  Kwasi Lambert MD as PCP - General  Avery Victoria PT as Physical Therapist (Physical Therapy)  Florinda Mortensen PTA as Physical Therapy Assistant (Physical Therapy)  Maryanne Chambers, PT as Physical Therapist (Physical Therapy)  Vicky Whiteside PTA as Physical Therapy Assistant (Physical Therapy)    Review of Systems    REVIEW OF SYSTEMS:   GENERAL: No fever  LUNGS: No cough wheezing or shortness of breath  CARDIAC: No lightheadedness palpitations or chest pain  GI: No anorexia heartburn dysphagia nausea vomiting abdominal pain diarrhea constipation or rectal bleeding  : No urinary frequency dysuria or hematuria  MUSCULOSKELETAL: Occasional numbness and tingling left arm awakening her from sleep at night, better with repositioning.  No leg swelling  NEURO: No headaches     Objective:   Physical Exam    EXAM:   GENERAL: Pleasant  female appearing well in no distress  /69 (BP Location: Left arm, Patient Position: Sitting, Cuff Size: large)   Pulse 53   Ht 5' 7\" (1.702 m)   Wt 183 lb 9.6 oz (83.3 kg)   BMI 28.76 kg/m²   HEENT: Anicteric, conjunctiva pink, oropharynx normal  NECK: Supple without mass or thyromegaly  NODES: No peripheral adenopathy  LUNGS: Resonant to percussion and clear to auscultation  CARDIAC: Rhythm regular S1 S2 normal without murmur or edema  ABDOMEN: Bowel sounds normal soft nontender without mass or hepatosplenomegaly  EXTREMITIES: Mild soft tissue swelling with severe tenderness radial aspect right wrist and proximal right thumb with very limited range of motion secondary to pain, otherwise normal without cyanosis or clubbing  PULSES: 2+ bilateral dorsalis pedis and posterior tibial  NEURO: Reflexes 2+ bilaterally and symmetric     /69 (BP Location: Left arm, Patient Position: Sitting, Cuff Size: large)   Pulse 53   Ht 5' 7\" (1.702 m)   Wt 183 lb 9.6 oz (83.3 kg)   BMI 28.76 kg/m²  Estimated body mass index is 28.76 kg/m² as calculated from the following:    Height as of this encounter: 5' 7\" (1.702 m).    Weight as of this encounter: 183 lb 9.6 oz (83.3 kg).    Medicare Hearing Assessment:   Hearing Screening    Time taken: 6/26/2024  9:59 AM  Entry User: Denisse Osorio LPN  Screening Method: Finger Rub  Finger Rub Result: Pass               Assessment & Plan:   Kendal Abrams is a 79 year old female who presents for a Medicare Assessment.     1. Annual physical exam (Primary)  Check CMP CBC lipid profile and TSH with reflex T4 today.  Order sent  Continue current medication  Reinforced healthy diet and regular exercise  Annual physical  -     Comp Metabolic Panel (14); Future; Expected date: 06/26/2024  -     CBC, Platelet; No Differential; Future; Expected date: 06/26/2024  -     Lipid Panel; Future; Expected date: 06/26/2024    2. History of breast cancer  No recurrence  Ongoing follow-up  with Oncology with annual mammogram    3. Hypercholesterolemia  Continue statin and await labs    4. Stage 3 chronic kidney disease, unspecified whether stage 3a or 3b CKD (HCC)  Stable.  Await labs    5. Gastroesophageal reflux disease without esophagitis  Controlled on PPI    6. History of colon polyps  Up-to-date on colonoscopy    7. Multinodular goiter  Stable.  Await TSH  Overview:  9/2016 US head/neck    8. Aortic atherosclerosis (HCC)  Asymptomatic.  Risk factor control including statin    9. Right wrist pain  X-ray right wrist and right thumb today.  Order sent  Recommend continued bracing, ice application, elevation.  Tramadol 50 mg 3 times daily as needed.  Warned about possible drowsiness.  Prescription sent to pharmacy.  -     XR WRIST (2 VIEWS), RIGHT (CPT=73100); Future; Expected date: 06/26/2024  -     XR FINGER(S) (MIN 2 VIEWS), RIGHT THUMB (CPT=73140); Future; Expected date: 06/26/2024    The patient indicates understanding of these issues and agrees to the plan.  Reinforced healthy diet, lifestyle, and exercise.      No follow-ups on file.     Kwasi Lambert MD, 6/26/2024     Supplementary Documentation:   General Health:  In the past six months, have you lost more than 10 pounds without trying?: 2 - No  Has your appetite been poor?: No  Type of Diet: Balanced  How does the patient maintain a good energy level?: Appropriate Exercise  How would you describe your daily physical activity?: Moderate  How would you describe your current health state?: Fair  How do you maintain positive mental well-being?: Social Interaction;Visiting Friends;Visiting Family  On a scale of 0 to 10, with 0 being no pain and 10 being severe pain, what is your pain level?: 10 - (Severe)  In the past six months, have you experienced urine leakage?: 0-No  At any time do you feel concerned for the safety/well-being of yourself and/or your children, in your home or elsewhere?: No  Have you had any immunizations at another office  such as Influenza, Hepatitis B, Tetanus, or Pneumococcal?: Yes       Kendal Abrams's SCREENING SCHEDULE   Tests on this list are recommended by your physician but may not be covered, or covered at this frequency, by your insurer.   Please check with your insurance carrier before scheduling to verify coverage.   PREVENTATIVE SERVICES FREQUENCY &  COVERAGE DETAILS LAST COMPLETION DATE   Diabetes Screening    Fasting Blood Sugar /  Glucose    One screening every 12 months if never tested or if previously tested but not diagnosed with pre-diabetes   One screening every 6 months if diagnosed with pre-diabetes Lab Results   Component Value Date     (H) 06/24/2023        Cardiovascular Disease Screening    Lipid Panel  Cholesterol  Lipoprotein (HDL)  Triglycerides Covered every 5 years for all Medicare beneficiaries without apparent signs or symptoms of cardiovascular disease Lab Results   Component Value Date    CHOLEST 165 06/24/2023    HDL 69 (H) 06/24/2023    LDL 82 06/24/2023    TRIG 71 06/24/2023         Electrocardiogram (EKG)   Covered if needed at Welcome to Medicare, and non-screening if indicated for medical reasons 09/30/2020      Ultrasound Screening for Abdominal Aortic Aneurysm (AAA) Covered once in a lifetime for one of the following risk factors    Men who are 65-75 years old and have ever smoked    Anyone with a family history -     Colorectal Cancer Screening  Covered for ages 50-85; only need ONE of the following:    Colonoscopy   Covered every 10 years    Covered every 2 years if patient is at high risk or previous colonoscopy was abnormal 11/17/2023    Health Maintenance   Topic Date Due    Colorectal Cancer Screening  11/17/2026       Flexible Sigmoidoscopy   Covered every 4 years -    Fecal Occult Blood Test Covered annually -   Bone Density Screening    Bone density screening    Covered every 2 years after age 65 if diagnosed with risk of osteoporosis or estrogen deficiency.    Covered  yearly for long-term glucocorticoid medication use (Steroids) Last Dexa Scan:    XR DEXA BONE DENSITOMETRY (CPT=77080) 08/04/2021      No recommendations at this time   Pap and Pelvic    Pap   Covered every 2 years for women at normal risk; Annually if at high risk -  No recommendations at this time    Chlamydia Annually if high risk -  No recommendations at this time   Screening Mammogram    Mammogram     Recommend annually for all female patients aged 40 and older    One baseline mammogram covered for patients aged 35-39 04/03/2024    No recommendations at this time    Immunizations    Influenza Covered once per flu season  Please get every year 09/19/2023  No recommendations at this time    Pneumococcal Each vaccine ([id number] & [id number]) covered once after 65 Prevnar 13: 03/22/2017    [id number]: 03/05/2018     No recommendations at this time    Hepatitis B One screening covered for patients with certain risk factors   -  No recommendations at this time    Tetanus Toxoid Not covered by Medicare Part B unless medically necessary (cut with metal); may be covered with your pharmacy prescription benefits -    Tetanus, Diptheria and Pertusis TD and TDaP Not covered by Medicare Part B -  No recommendations at this time    Zoster Not covered by Medicare Part B; may be covered with your pharmacy  prescription benefits -  No recommendations at this time

## 2024-06-26 NOTE — PATIENT INSTRUCTIONS
Await results of blood tests and right wrist and thumb x-rays  Continue current medication, healthy diet and regular exercise  Use your wrist brace for now pending x-ray results and take tramadol 50 mg 3 times daily as needed, watching for drowsiness  Annual physical  Kendal Abrams's SCREENING SCHEDULE   Tests on this list are recommended by your physician but may not be covered, or covered at this frequency, by your insurer.   Please check with your insurance carrier before scheduling to verify coverage.   PREVENTATIVE SERVICES FREQUENCY &  COVERAGE DETAILS LAST COMPLETION DATE   Diabetes Screening    Fasting Blood Sugar /  Glucose    One screening every 12 months if never tested or if previously tested but not diagnosed with pre-diabetes   One screening every 6 months if diagnosed with pre-diabetes Lab Results   Component Value Date     (H) 06/24/2023        Cardiovascular Disease Screening    Lipid Panel  Cholesterol  Lipoprotein (HDL)  Triglycerides Covered every 5 years for all Medicare beneficiaries without apparent signs or symptoms of cardiovascular disease Lab Results   Component Value Date    CHOLEST 165 06/24/2023    HDL 69 (H) 06/24/2023    LDL 82 06/24/2023    TRIG 71 06/24/2023         Electrocardiogram (EKG)   Covered if needed at Welcome to Medicare, and non-screening if indicated for medical reasons 09/30/2020      Ultrasound Screening for Abdominal Aortic Aneurysm (AAA) Covered once in a lifetime for one of the following risk factors   • Men who are 65-75 years old and have ever smoked   • Anyone with a family history -     Colorectal Cancer Screening  Covered for ages 50-85; only need ONE of the following:    Colonoscopy   Covered every 10 years    Covered every 2 years if patient is at high risk or previous colonoscopy was abnormal 11/17/2023    Health Maintenance   Topic Date Due   • Colorectal Cancer Screening  11/17/2026       Flexible Sigmoidoscopy   Covered every 4 years -     Fecal Occult Blood Test Covered annually -   Bone Density Screening    Bone density screening    Covered every 2 years after age 65 if diagnosed with risk of osteoporosis or estrogen deficiency.    Covered yearly for long-term glucocorticoid medication use (Steroids) Last Dexa Scan:    XR DEXA BONE DENSITOMETRY (CPT=77080) 08/04/2021      No recommendations at this time   Pap and Pelvic    Pap   Covered every 2 years for women at normal risk; Annually if at high risk -  No recommendations at this time    Chlamydia Annually if high risk -  No recommendations at this time   Screening Mammogram    Mammogram     Recommend annually for all female patients aged 40 and older    One baseline mammogram covered for patients aged 35-39 04/03/2024    No recommendations at this time    Immunizations    Influenza Covered once per flu season  Please get every year 09/19/2023  No recommendations at this time    Pneumococcal Each vaccine ([id number] & [id number]) covered once after 65 Prevnar 13: 03/22/2017    Jaadhvhby72: 03/05/2018     No recommendations at this time    Hepatitis B One screening covered for patients with certain risk factors   -  No recommendations at this time    Tetanus Toxoid Not covered by Medicare Part B unless medically necessary (cut with metal); may be covered with your pharmacy prescription benefits -    Tetanus, Diptheria and Pertusis TD and TDaP Not covered by Medicare Part B -  No recommendations at this time    Zoster Not covered by Medicare Part B; may be covered with your pharmacy  prescription benefits -  No recommendations at this time

## 2024-07-08 ENCOUNTER — TELEPHONE (OUTPATIENT)
Dept: CASE MANAGEMENT | Age: 79
End: 2024-07-08

## 2024-07-08 DIAGNOSIS — M25.531 RIGHT WRIST PAIN: Primary | ICD-10-CM

## 2024-07-08 NOTE — TELEPHONE ENCOUNTER
Good Afternoon    Patient called and [facility] on [facility] voicemail today at 11:59am.    States needs an order so she can get support for her wrist.    Thank you    Oriana  [facility]

## 2024-07-10 ENCOUNTER — NURSE TRIAGE (OUTPATIENT)
Dept: INTERNAL MEDICINE CLINIC | Facility: CLINIC | Age: 79
End: 2024-07-10

## 2024-07-10 DIAGNOSIS — R20.2 NUMBNESS AND TINGLING IN LEFT ARM: Primary | ICD-10-CM

## 2024-07-10 DIAGNOSIS — R20.0 NUMBNESS AND TINGLING IN LEFT ARM: Primary | ICD-10-CM

## 2024-07-10 NOTE — TELEPHONE ENCOUNTER
Attempted to call patient H# and cell #, no answer.       Future Appointments   Date Time Provider Department Center   4/15/2025  9:15 AM Frederic Rosales MD [facility] HEM ONC [facility]

## 2024-07-10 NOTE — TELEPHONE ENCOUNTER
Dr. Lambert out of office:  Patient reports she discussed this with Dr. Lambert at her recent visit and would like to know if you would be able to order imaging to see if there is inflammation. Patient reports she is concerned she may need a cortisone injection. Symptoms chronic     Action Requested: Summary for Provider     []  Critical Lab, Recommendations Needed  [] Need Additional Advice  []   FYI    [x]   Need Orders  [] Need Medications Sent to Pharmacy  []  Other     SUMMARY: Visit recommended. See above     Reason for call: Tingling  Onset: chronic     Patient states she discussed symptoms at visit on 6/26/24. Numbness and tingling to left arm intermittently. Mainly positional when laying down. Denies weakness or any other symptoms.     Reason for Disposition   Numbness or tingling in one or both hands is a chronic symptom (recurrent or ongoing problem lasting > 4 weeks)    Protocols used: Neurologic Deficit-A-[place]

## 2024-07-10 NOTE — TELEPHONE ENCOUNTER
Patient wakes up with her left arm numb and tingling and is wondering if she should have an xray of her whole arm.

## 2024-07-11 ENCOUNTER — TELEPHONE (OUTPATIENT)
Dept: INTERNAL MEDICINE CLINIC | Facility: CLINIC | Age: 79
End: 2024-07-11

## 2024-07-11 NOTE — TELEPHONE ENCOUNTER
Called and spoke to patient, informed her Dr. Lambert has ordered the x-rays for her left arm. Provided the patient with the phone number for central scheduling and transferred her to the department.

## 2024-07-12 ENCOUNTER — HOSPITAL ENCOUNTER (OUTPATIENT)
Dept: GENERAL RADIOLOGY | Facility: HOSPITAL | Age: 79
Discharge: HOME | End: 2024-07-12
Attending: INTERNAL MEDICINE
Payer: MEDICARE

## 2024-07-12 DIAGNOSIS — R20.2 NUMBNESS AND TINGLING IN LEFT ARM: ICD-10-CM

## 2024-07-12 DIAGNOSIS — R20.0 NUMBNESS AND TINGLING IN LEFT ARM: ICD-10-CM

## 2024-07-12 PROCEDURE — 73060 X-RAY EXAM OF HUMERUS: CPT | Performed by: INTERNAL MEDICINE

## 2024-07-12 PROCEDURE — 73090 X-RAY EXAM OF FOREARM: CPT | Performed by: INTERNAL MEDICINE

## 2024-07-30 NOTE — TELEPHONE ENCOUNTER
Patient called for results , asking why wasn't she called , she still have the same symptoms , at rest,advised appt to be evaluated, appointment made   Spoke with patient (identified name and ), results reviewed and agrees with plan.              Kwasi Lambert MD  2024  3:49 PM CDT    X-rays left humerus and left forearm normal except widened scapholunate interval

## 2024-08-05 ENCOUNTER — OFFICE VISIT (OUTPATIENT)
Dept: INTERNAL MEDICINE CLINIC | Facility: CLINIC | Age: 79
End: 2024-08-05

## 2024-08-05 VITALS
SYSTOLIC BLOOD PRESSURE: 125 MMHG | BODY MASS INDEX: 28.82 KG/M2 | HEIGHT: 67 IN | DIASTOLIC BLOOD PRESSURE: 74 MMHG | HEART RATE: 56 BPM | WEIGHT: 183.63 LBS

## 2024-08-05 DIAGNOSIS — R20.0 NUMBNESS: Primary | ICD-10-CM

## 2024-08-05 PROCEDURE — G2211 COMPLEX E/M VISIT ADD ON: HCPCS | Performed by: INTERNAL MEDICINE

## 2024-08-05 PROCEDURE — 3074F SYST BP LT 130 MM HG: CPT | Performed by: INTERNAL MEDICINE

## 2024-08-05 PROCEDURE — 1159F MED LIST DOCD IN RCRD: CPT | Performed by: INTERNAL MEDICINE

## 2024-08-05 PROCEDURE — 3078F DIAST BP <80 MM HG: CPT | Performed by: INTERNAL MEDICINE

## 2024-08-05 PROCEDURE — 3008F BODY MASS INDEX DOCD: CPT | Performed by: INTERNAL MEDICINE

## 2024-08-05 PROCEDURE — 99213 OFFICE O/P EST LOW 20 MIN: CPT | Performed by: INTERNAL MEDICINE

## 2024-08-05 PROCEDURE — 1126F AMNT PAIN NOTED NONE PRSNT: CPT | Performed by: INTERNAL MEDICINE

## 2024-08-05 PROCEDURE — 1160F RVW MEDS BY RX/DR IN RCRD: CPT | Performed by: INTERNAL MEDICINE

## 2024-08-05 NOTE — PROGRESS NOTES
Kendal Abrams is a 79 year old female.   Chief Complaint   Patient presents with    Numbness     Left arm and radiates to left foot and sometimes on right foot     HPI:   She continues to notice episodes of brief numbness, sometimes in her left arm and left hand, and sometimes in her left leg and left foot.  She also notices milder numbness occasionally in her right foot.  Symptoms seem to be positional and are relieved promptly with repositioning of her arm or leg.  She has no associated pain.  No muscle weakness.  She continues to exercise regularly and symptoms do not limit normal daily activities.  She is concerned about a possible significant underlying cause.  After discussion, she agrees to see Neurology.  Current Outpatient Medications   Medication Sig Dispense Refill    atorvastatin 20 MG Oral Tab Take 1 tablet (20 mg total) by mouth every evening. 90 tablet 3    aspirin 81 MG Oral Tab EC Take 1 tablet (81 mg total) by mouth daily.      omeprazole 20 MG Oral Capsule Delayed Release Take 1 capsule (20 mg total) by mouth every morning before breakfast.      Vitamin D3 1000 units Oral Tab Take 1 tablet (1,000 Units total) by mouth daily.       No Known Allergies   Past Medical History:    Aortic atherosclerosis (HCC)    CXR 1-18    Breast CA (HCC)    right dcis 2021    Chronic kidney disease, stage III (moderate) (HCC)    Deep vein thrombosis (HCC)    RLE, treated with Coumadin for 6 months    Ductal carcinoma in situ (DCIS) of right breast    Ductal carcinoma in situ, low nuclear grade, solid and cribriform type, with focal central necrosis and associated microcalcifications, multifocal, largest focus is 4.5 mm in greatest dimension; s/p right total mastectomy and right sentinel lymph node biopsy, right lymphoscintigraphy 3-21    GERD (gastroesophageal reflux disease)    EGD 11-16 with hiatal hernia and gastritis with biopsies benign and H. pylori testing negative    History of colon polyps     Hypercholesterolemia    Osteoarthritis of right knee    Status post right TKA 10-20    Varicose veins of both lower extremities    Visual impairment    readers     Past Surgical History:   Procedure Laterality Date    Biopsy of breast, needle core Left 2008    Fibroadenoma    Biopsy of breast, needle core Right 02/10/2021    cork clip    Biopsy of skin lesion  2018    upper mid back epidermal inclusion cyst    Cataract Right 2019    Cataract Left 2019    Colonoscopy      [facility]    Colonoscopy N/A 2023    Procedure: COLONOSCOPY;  Surgeon: Hugo Torres MD;  Location: [facility] ENDOSCOPY    Egd N/A 2016    Procedure: ESOPHAGOGASTRODUODENOSCOPY (EGD);  Surgeon: Lindsay Solorzano MD;  Location: [facility] ENDOSCOPY    Hemorrhoidectomy      Knee arthroscopy Right     Mastectomy right      Mastectomy,simple Right 2021    Salpingectomy Left     Ectopic pregnancy    Sent lymph node biopsy Right 2021    Stab phlebectomy, varicose veins, 1 extremity; <20 incisions Right     Total knee replacement Right 10/09/2020      Social History:  Social History     Socioeconomic History    Marital status: Single    Number of children: 0   Occupational History    Occupation: Nurse - dialysis, OR     Comment: retired   Tobacco Use    Smoking status: Former     Current packs/day: 0.00     Average packs/day: 0.5 packs/day for 2.0 years (1.0 ttl pk-yrs)     Types: Cigarettes     Start date: 1973     Quit date: 1975     Years since quittin.7    Smokeless tobacco: Never   Vaping Use    Vaping status: Never Used   Substance and Sexual Activity    Alcohol use: Yes     Comment: Occasional    Drug use: No        EXAM:   GENERAL: Pleasant female appearing well in no distress  /74 (BP Location: Left arm, Patient Position: Sitting, Cuff Size: adult)   Pulse 56   Ht 5' 7\" (1.702 m)   Wt 183 lb 9.6 oz (83.3 kg)   BMI 28.76 kg/m²   NEURO: Reflexes  1-2+ bilateral upper and bilateral lower extremities.  Strength 5/5 bilateral upper and bilateral lower extremities without weakness.  Sensation intact to light touch without focal deficit      ASSESSMENT AND PLAN:   1. Numbness  Etiology unclear.  By history seems positional.  Refer to Neurology.  Name and phone number given.  She will schedule appointment  - Neuro Referral - [facility] ([place])      The patient indicates understanding of these issues and agrees to the plan.  The patient is asked to return as needed.    Kwasi Lambert MD  8/5/2024  3:46 PM

## 2024-09-23 ENCOUNTER — TELEPHONE (OUTPATIENT)
Dept: INTERNAL MEDICINE CLINIC | Facility: CLINIC | Age: 79
End: 2024-09-23

## 2024-09-23 DIAGNOSIS — M25.562 LEFT KNEE PAIN, UNSPECIFIED CHRONICITY: Primary | ICD-10-CM

## 2024-09-23 NOTE — TELEPHONE ENCOUNTER
Patient is requesting referral.     Name of specialist and specialty department : [facility]   Reason for visit with the specialist: consult- pain and swelling in left knee   Address of the specialist office:[address] [place] [address], [facility] [address]  Appointment date: 9/30/24         [facility] informed patient the turnaround time for referral is 5-7 business days.  Patient was informed to check their [employer] account for referral status.

## 2024-09-24 NOTE — TELEPHONE ENCOUNTER
Dr. Lambert, *    Patient called requesting referral to Dr. Shaffer.     Pended referral please review diagnosis and sign off if you agree.    Thank you.  Belinda De Los Santos  [facility]

## 2024-09-26 NOTE — TELEPHONE ENCOUNTER
[facility] has denied the referral for Edda Perera as provider is out of network. Patient will need to be redirected to an in network provider. Referral has been canceled. Patient can contact [facility] to find an in network provider and confirm the network status for Dr. Wilder. Called patient at # [phone] twice. Both times the phone was answered but there was just static and no response.

## 2024-10-10 ENCOUNTER — PROCEDURE VISIT (OUTPATIENT)
Dept: NEUROLOGY | Facility: CLINIC | Age: 79
End: 2024-10-10
Payer: MEDICARE

## 2024-10-10 ENCOUNTER — OFFICE VISIT (OUTPATIENT)
Dept: NEUROLOGY | Facility: CLINIC | Age: 79
End: 2024-10-10
Payer: MEDICARE

## 2024-10-10 VITALS
SYSTOLIC BLOOD PRESSURE: 96 MMHG | HEART RATE: 62 BPM | BODY MASS INDEX: 28.39 KG/M2 | HEIGHT: 67.5 IN | WEIGHT: 183 LBS | DIASTOLIC BLOOD PRESSURE: 54 MMHG

## 2024-10-10 DIAGNOSIS — G56.02 LEFT CARPAL TUNNEL SYNDROME: Primary | ICD-10-CM

## 2024-10-10 DIAGNOSIS — G56.02 LEFT CARPAL TUNNEL SYNDROME: ICD-10-CM

## 2024-10-10 PROCEDURE — 3078F DIAST BP <80 MM HG: CPT | Performed by: INTERNAL MEDICINE

## 2024-10-10 PROCEDURE — 3074F SYST BP LT 130 MM HG: CPT | Performed by: INTERNAL MEDICINE

## 2024-10-10 PROCEDURE — 99202 OFFICE O/P NEW SF 15 MIN: CPT | Performed by: INTERNAL MEDICINE

## 2024-10-10 PROCEDURE — 1160F RVW MEDS BY RX/DR IN RCRD: CPT | Performed by: INTERNAL MEDICINE

## 2024-10-10 PROCEDURE — 3008F BODY MASS INDEX DOCD: CPT | Performed by: INTERNAL MEDICINE

## 2024-10-10 PROCEDURE — 1159F MED LIST DOCD IN RCRD: CPT | Performed by: INTERNAL MEDICINE

## 2024-10-10 NOTE — PROGRESS NOTES
[facility]  [address] [place] [address], [address]  [facility] [address]  [phone]            Neurology Initial Visit     Referred By: Dr. Lambert    Chief Complaint:   Chief Complaint   Patient presents with    Numbness     New patient presents with BUE N/T L>R for about the last six to eight months. Pt states she doesn't have any sensativity in her arm sometimes. She also states that she gets N/T in her finger tips. Pt states sometimes the N/T wakes her out of her sleep.Pt was a nurse working in surgery, she is right dominate.        HPI:     Kendal Abrams is a 79 year old female history of DCIS right breast status postmastectomy, hyperlipidemia, vitamin D deficiency who presents for evaluation of numbness and tingling in the arms.  Symptoms are mainly on the left, only feels it slightly on the right.  Has tingling in the fingertips while she is sleeping at night, has to shake the hand out at night.  Denies any radiating pains in the left arm.  No tingling in the feet.    Past Medical History:    Aortic atherosclerosis (HCC)    CXR 1-18    Breast CA (HCC)    right dcis 2021    Chronic kidney disease, stage III (moderate) (HCC)    Deep vein thrombosis (HCC)    RLE, treated with Coumadin for 6 months    Ductal carcinoma in situ (DCIS) of right breast    Ductal carcinoma in situ, low nuclear grade, solid and cribriform type, with focal central necrosis and associated microcalcifications, multifocal, largest focus is 4.5 mm in greatest dimension; s/p right total mastectomy and right sentinel lymph node biopsy, right lymphoscintigraphy 3-21    GERD (gastroesophageal reflux disease)    EGD 11-16 with hiatal hernia and gastritis with biopsies benign and H. pylori testing negative    History of colon polyps    Hypercholesterolemia    Osteoarthritis of right knee    Status post right TKA 10-20    Varicose veins of both lower extremities    Visual impairment    readers       Past Surgical  History:   Procedure Laterality Date    Biopsy of breast, needle core Left 2008    Fibroadenoma    Biopsy of breast, needle core Right 02/10/2021    cork clip    Biopsy of skin lesion  2018    upper mid back epidermal inclusion cyst    Cataract Right 2019    Cataract Left 2019    Colonoscopy      [facility]    Colonoscopy N/A 2023    Procedure: COLONOSCOPY;  Surgeon: Hugo Torres MD;  Location: [facility] ENDOSCOPY    Egd N/A 2016    Procedure: ESOPHAGOGASTRODUODENOSCOPY (EGD);  Surgeon: Lindsay Solorzano MD;  Location: [facility] ENDOSCOPY    Hemorrhoidectomy      Knee arthroscopy Right     Mastectomy right      Mastectomy,simple Right 2021    Salpingectomy Left     Ectopic pregnancy    Sent lymph node biopsy Right 2021    Stab phlebectomy, varicose veins, 1 extremity; <20 incisions Right     Total knee replacement Right 10/09/2020       Social history:  History   Smoking Status    Former    Types: Cigarettes   Smokeless Tobacco    Never     History   Alcohol Use    Yes     Comment: Occasional     History   Drug Use No       Family History   Problem Relation Age of Onset    Colon Cancer Father          age 35    Hypertension Mother     Glaucoma Mother     Diabetes Sister     Hypertension Sister     Glaucoma Sister     Breast Cancer Niece 50    Cancer Niece         thyroid cancer    Breast Cancer Self 76    Macular degeneration Neg     Ovarian Cancer Neg          Current Outpatient Medications:     atorvastatin 20 MG Oral Tab, Take 1 tablet (20 mg total) by mouth every evening., Disp: 90 tablet, Rfl: 3    aspirin 81 MG Oral Tab EC, Take 1 tablet (81 mg total) by mouth daily., Disp: , Rfl:     omeprazole 20 MG Oral Capsule Delayed Release, Take 1 capsule (20 mg total) by mouth every morning before breakfast., Disp: , Rfl:     Vitamin D3 1000 units Oral Tab, Take 1 tablet (1,000 Units total) by mouth daily., Disp: , Rfl:      Allergies[1]    ROS:   As in HPI, the rest of the 14 system review was done and was negative      Physical Exam:  Vitals:    10/10/24 0958   BP: 96/54   Pulse: 62   Weight: 183 lb (83 kg)   Height: 67.5\"       General: No apparent distress, well nourished, well groomed.  Head- Normocephalic, atraumatic  Eyes- No redness or swelling    Neurological:   Mental Status:  Mental Status- Alert, conversant, speech fluent, following all commands and Fund of knowledge appropriate for education and age    Cranial Nerves:  II.- Visual fields full to confrontation, III, IV, VI- EOM intact, V. Facial sensation intact, and VII. Face symmetric, no facial weakness    Motor Exam:  Strength-reduced muscle bulk left greater than right thenar eminence.  Otherwise upper extremities 5/5 proximally and distally                - lower  extremities 5/5 proximally and distally    Sensory Exam:  Pinprick- intact in all 4 extremities    Deep Tendon Reflexes:  1+ throughout except absent ankle jerk    Coordination:  No dysmetria with finger to nose bilaterally    Gait:  Normal casual gait    Labs:    Lab Results   Component Value Date    TSH 0.841 06/26/2024     Lab Results   Component Value Date    HDL 56 06/26/2024     (H) 06/26/2024    TRIG 94 06/26/2024     Lab Results   Component Value Date    HGB 13.1 06/26/2024    HCT 38.6 06/26/2024    MCV 90.4 06/26/2024    WBC 4.8 06/26/2024    .0 06/26/2024      Lab Results   Component Value Date    BUN 20 06/26/2024    CA 9.2 06/26/2024    ALT 14 06/26/2024    AST 21 06/26/2024    ALKPHOS 53 08/29/2016    ALB 4.3 06/26/2024     06/26/2024    K 4.6 06/26/2024     06/26/2024    CO2 25.0 06/26/2024      I have reviewed labs.    Imaging Studies:  I have independently reviewed imaging.        Assessment   Kendal Abrams is a 79 year old female history of DCIS right breast status postmastectomy, hyperlipidemia, vitamin D deficiency who presents for evaluation of numbness  and tingling in the arms.  Symptoms are consistent with left carpal tunnel syndrome.  EMG was done during this visit and confirmed moderate severity carpal tunnel.    1. Left carpal tunnel syndrome  - EMG completed, shows moderate severity left carpal tunnel syndrome  -Start wearing wrist splint at night while sleeping  -If symptoms not improved after 3 months, refer to Ortho hand for carpal tunnel release  -Right sided symptoms are likely carpal tunnel as well but milder.  Continue splinting right side as well.       Education and counseling provided to patient. Instructed patient to call my office or seek medical attention immediately if symptoms worsen.  Patient verbalized understanding of information given. All questions were answered. All side effects of drugs were discussed.     I have spent 20 min total time on the day of the encounter, including: Total time spent reasons:  Preparing to see the patient, Obtaining and/or reviewing separately obtained history, Performing a medically appropriate examination and/or evaluation, Counseling and educating the patient/family/caregiver, Ordering medications, tests, or procedures, Documenting clinical information in Epic, and Independently interpreting results and communicating results to the patient/family/caregiver      Return to clinic in: No follow-ups on file.    Sofi Flanagan MD         [1] No Known Allergies

## 2024-10-10 NOTE — PROCEDURES
[address] [address]  Phone: [phone]  Fax: [phone]    ELECTRODIAGNOSTIC REPORT          Patient: Kendal Abrams Hand Dominance:      Patient ID: [id number] Referring Dr:      Sex: Female Test Dr:      Date of Birth:             Visit Date: 10/10/2024 10:12 AM Examining MD: Sofi Flanagan M.D.   Age:   Referred by:     Height:   Weight:     Referred for:                 Summary    The motor conduction test was abnormal in the L Median - APB, due to severely prolonged distal motor latency.  L Ulnar - ADM three point study was normal.    The sensory conduction test was performed on 2 nerve(s). Findings were unremarkable in 1 nerve(s): L Ulnar - Dig V (Antidromic). Results outside the specified normal range were found in 1 nerve(s), as follows:  In the L Median - Dig II  (Antidromic) study  the response was considered absent for Wrist stimulation          Conclusion:       ABNORMAL STUDY    There is electrophysiologic evidence of a left median mononeuropathy at the wrist, which is moderate in severity and clinically consistent with left carpal tunnel syndrome.      ________________________________  Sofi Flanagan M.D.  Neurology        ________________________________  Sofi Flanagan M.D.               Motor Nerve Conduction Study       Nerve / Sites Muscle Latency Amplitude Amp Dur. Segments Distance Lat Diff Velocity     ms mV % ms  cm ms m/s   L Median - APB      Wrist APB 9.60 7.0  8.3 Wrist - APB 0        Elbow APB 14.40 6.4 91.1 8.4 Elbow - Wrist 21.2 4.79 44   L Ulnar - ADM      Wrist ADM 3.12 9.4 100 7.2 Wrist - ADM 8        B.Elbow ADM 6.69 8.7 91.8 6.8 B.Elbow - Wrist 19 3.56 53      A.Elbow ADM 9.02 8.4 96.8 6.7 A.Elbow - B.Elbow 10 2.33 43       Sensory Nerve Conduction Study       Nerve / Sites Rec. Site Onset Lat Peak Lat NP Amp PP Amp Segments Distance Velocity     ms ms µV µV  cm m/s   L Median - Dig II  (Antidromic)      Wrist  Index NR NR NR NR Wrist - Index 14 NR   L Ulnar - Dig V (Antidromic)      Wrist Dig V 2.97 4.01 23.4 39.8 Wrist - Dig V 14 47

## 2024-11-01 ENCOUNTER — TELEPHONE (OUTPATIENT)
Dept: CASE MANAGEMENT | Age: 79
End: 2024-11-01

## 2024-11-01 DIAGNOSIS — M25.562 LEFT KNEE PAIN, UNSPECIFIED CHRONICITY: Primary | ICD-10-CM

## 2024-11-01 NOTE — TELEPHONE ENCOUNTER
Dr. Lambert,     Patient called requesting referral to an in network orthopedic that you recommend.     Please have staff notify patient with the name of the specialist that you recommend.      Pended referral please review diagnosis and sign off if you agree.    Thank you.  Belinda De Los Santos  [facility]

## 2024-11-25 ENCOUNTER — TELEPHONE (OUTPATIENT)
Dept: INTERNAL MEDICINE CLINIC | Facility: CLINIC | Age: 79
End: 2024-11-25

## 2024-11-25 DIAGNOSIS — L84 CALLUS OF FOOT: Primary | ICD-10-CM

## 2024-11-25 NOTE — TELEPHONE ENCOUNTER
Dr Lambert,     Patient is requesting a referral to an in-network  podiatrist for a callus on her foot.     Please provide the name of the specialist on the referral.     Pended referral please review diagnosis and sign off if you agree.    Thank you.  Belinda De Los Santos  [facility]

## 2024-11-25 NOTE — TELEPHONE ENCOUNTER
Patient is requesting referral.     Name of specialist and specialty department : podiatrist, asking for recommendation from Dr. Lambert  Reason for visit with the specialist: callous on left foot  Address of the specialist office:none  Appointment date: none     Asking for call once placed.     [facility] informed patient the turnaround time for referral is 5-7 business days.  Patient was informed to check their [employer] account for referral status.

## 2024-12-04 ENCOUNTER — HOSPITAL ENCOUNTER (OUTPATIENT)
Dept: GENERAL RADIOLOGY | Facility: HOSPITAL | Age: 79
Discharge: HOME | End: 2024-12-04
Attending: ORTHOPAEDIC SURGERY
Payer: MEDICARE

## 2024-12-04 ENCOUNTER — OFFICE VISIT (OUTPATIENT)
Dept: ORTHOPEDICS CLINIC | Facility: CLINIC | Age: 79
End: 2024-12-04

## 2024-12-04 VITALS
WEIGHT: 185 LBS | DIASTOLIC BLOOD PRESSURE: 77 MMHG | HEIGHT: 67.72 IN | SYSTOLIC BLOOD PRESSURE: 134 MMHG | HEART RATE: 56 BPM | BODY MASS INDEX: 28.36 KG/M2

## 2024-12-04 DIAGNOSIS — Z47.89 ORTHOPEDIC AFTERCARE: ICD-10-CM

## 2024-12-04 DIAGNOSIS — M17.12 PRIMARY OSTEOARTHRITIS OF LEFT KNEE: Primary | ICD-10-CM

## 2024-12-04 PROCEDURE — 1159F MED LIST DOCD IN RCRD: CPT | Performed by: ORTHOPAEDIC SURGERY

## 2024-12-04 PROCEDURE — 99203 OFFICE O/P NEW LOW 30 MIN: CPT | Performed by: ORTHOPAEDIC SURGERY

## 2024-12-04 PROCEDURE — 3008F BODY MASS INDEX DOCD: CPT | Performed by: ORTHOPAEDIC SURGERY

## 2024-12-04 PROCEDURE — 1126F AMNT PAIN NOTED NONE PRSNT: CPT | Performed by: ORTHOPAEDIC SURGERY

## 2024-12-04 PROCEDURE — 1160F RVW MEDS BY RX/DR IN RCRD: CPT | Performed by: ORTHOPAEDIC SURGERY

## 2024-12-04 PROCEDURE — 3075F SYST BP GE 130 - 139MM HG: CPT | Performed by: ORTHOPAEDIC SURGERY

## 2024-12-04 PROCEDURE — 3078F DIAST BP <80 MM HG: CPT | Performed by: ORTHOPAEDIC SURGERY

## 2024-12-04 PROCEDURE — 20610 DRAIN/INJ JOINT/BURSA W/O US: CPT | Performed by: ORTHOPAEDIC SURGERY

## 2024-12-04 PROCEDURE — 73562 X-RAY EXAM OF KNEE 3: CPT | Performed by: ORTHOPAEDIC SURGERY

## 2024-12-04 RX ORDER — TRIAMCINOLONE ACETONIDE 40 MG/ML
40 INJECTION, SUSPENSION INTRA-ARTICULAR; INTRAMUSCULAR ONCE
Status: COMPLETED | OUTPATIENT
Start: 2024-12-04 | End: 2024-12-04

## 2024-12-04 RX ADMIN — TRIAMCINOLONE ACETONIDE 40 MG: 40 INJECTION, SUSPENSION INTRA-ARTICULAR; INTRAMUSCULAR at 16:07:00

## 2024-12-04 NOTE — PROGRESS NOTES
Per verbal order from Dr. Cho, draw up and 4ml of 0.5% Marcaine and 1ml of Kenalog 40 for injection into leftkneeJenny M, CMA  Patient provided education handout for cortisone injection.

## 2024-12-04 NOTE — PROGRESS NOTES
NURSING INTAKE COMMENTS:   Chief Complaint   Patient presents with    Knee Pain     Left f/u - was seen on 12/8/21 when she had a cortisone inj - pain restarted about 1 mo ago  and is rated as 8/10 on and off - has swelling -        HPI: This 79 year old female presents today with complaints of left knee pain.  I replaced her right knee about 5 years ago.  She has responded well to cortisone injections in the left knee.    Past Medical History:    Aortic atherosclerosis (HCC)    CXR 1-18    Breast CA (HCC)    right dcis 2021    Chronic kidney disease, stage III (moderate) (HCC)    Deep vein thrombosis (HCC)    RLE, treated with Coumadin for 6 months    Ductal carcinoma in situ (DCIS) of right breast    Ductal carcinoma in situ, low nuclear grade, solid and cribriform type, with focal central necrosis and associated microcalcifications, multifocal, largest focus is 4.5 mm in greatest dimension; s/p right total mastectomy and right sentinel lymph node biopsy, right lymphoscintigraphy 3-21    GERD (gastroesophageal reflux disease)    EGD 11-16 with hiatal hernia and gastritis with biopsies benign and H. pylori testing negative    History of colon polyps    Hypercholesterolemia    Osteoarthritis of right knee    Status post right TKA 10-20    Varicose veins of both lower extremities    Visual impairment    readers     Past Surgical History:   Procedure Laterality Date    Biopsy of breast, needle core Left 01/24/2008    Fibroadenoma    Biopsy of breast, needle core Right 02/10/2021    cork clip    Biopsy of skin lesion  06/20/2018    upper mid back epidermal inclusion cyst    Cataract Right 02/2019    Cataract Left 03/2019    Colonoscopy  2014    [facility]    Colonoscopy N/A 11/17/2023    Procedure: COLONOSCOPY;  Surgeon: Hugo Torres MD;  Location: [facility] ENDOSCOPY    Egd N/A 11/17/2016    Procedure: ESOPHAGOGASTRODUODENOSCOPY (EGD);  Surgeon: Lindsay Solorzano MD;  Location: [facility] ENDOSCOPY     Hemorrhoidectomy  1985    Knee arthroscopy Right     Mastectomy right      Mastectomy,simple Right 2021    Salpingectomy Left     Ectopic pregnancy    Sent lymph node biopsy Right 2021    Stab phlebectomy, varicose veins, 1 extremity; <20 incisions Right     Total knee replacement Right 10/09/2020     Current Outpatient Medications   Medication Sig Dispense Refill    atorvastatin 20 MG Oral Tab Take 1 tablet (20 mg total) by mouth every evening. 90 tablet 3    aspirin 81 MG Oral Tab EC Take 1 tablet (81 mg total) by mouth daily.      omeprazole 20 MG Oral Capsule Delayed Release Take 1 capsule (20 mg total) by mouth every morning before breakfast.      Vitamin D3 1000 units Oral Tab Take 1 tablet (1,000 Units total) by mouth daily.       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   Tobacco Use    Smoking status: Former     Current packs/day: 0.00     Average packs/day: 0.5 packs/day for 2.0 years (1.0 ttl pk-yrs)     Types: Cigarettes     Start date: 1973     Quit date: 1975     Years since quittin.0    Smokeless tobacco: Never   Vaping Use    Vaping status: Never Used   Substance and Sexual Activity    Alcohol use: Yes     Comment: Occasional    Drug use: No    Sexual activity: Not on file        Review of Systems:  GENERAL: feels generally well, no significant weight loss or weight gain  SKIN: no ulcerated or worrisome skin lesions  EYES:denies blurred vision or double vision  HEENT: denies new nasal congestion, sinus pain or ST  LUNGS: denies shortness of  breath  CARDIOVASCULAR: denies chest pain  GI: no hematemesis, no worsening heartburn, no diarrhea  : no dysuria, no blood in urine, no difficulty urinating, no incontinence  MUSCULOSKELETAL: no other musculoskeletal complaints other than in HPI  NEURO: no numbness or tingling, no weakness or balance disorder  PSYCHE: no depression or anxiety  HEMATOLOGIC: no hx of blood dyscrasia  ENDOCRINE: no thyroid or diabetes issues  ALL/ASTHMA: no new hx of severe allergy or asthma    Physical Examination:    Ht 5' 7.72\" (1.72 m)   Wt 185 lb (83.9 kg)   BMI 28.36 kg/m²   Constitutional: appears well hydrated, alert and responsive, no acute distress noted  Extremities: Mild swelling of the left knee.  Full extension with 100 degrees of flexion.  Mild varus deformity.  No instability.      Imaging: XR KNEE (3 VIEWS), LEFT (CPT=73562)    Result Date: 12/4/2024  PROCEDURE: XR KNEE ROUTINE (3 VIEWS), LEFT (CPT=73562)  COMPARISON: [facility] 2nd Floor, XR KNEE, COMPLETE (4 OR MORE VIEWS), LEFT (CPT=73564), 12/08/2021, 9:29 AM.  INDICATIONS: chronic left knee pain. no trauma  TECHNIQUE: 3 views were obtained.   FINDINGS:   Bone mineralization is normal.  There is no acute fracture/dislocation.  The patient is status post right knee arthroplasty with femoral and tibial components in expected configuration.  There is no radiographic evidence of hardware loosening on this single image.  There are severe degenerative changes throughout the left knee manifested by osteophyte formation, articular space narrowing, subarticular sclerosis, tibial spine sharpening, and slight medial pseudosubluxation.         CONCLUSION:   1. Severe degenerative changes within the left knee.  2. Right knee arthroplasty with no radiographic evidence of hardware loosening    Dictated by (CST): Blu Cote MD on 12/04/2024 at 3:28 PM     Finalized by (CST): Blu Cote MD on 12/04/2024 at 3:32 PM             Lab Results    Component Value Date    WBC 4.8 06/26/2024    HGB 13.1 06/26/2024    .0 06/26/2024      Lab Results   Component Value Date    GLU 96 06/26/2024    BUN 20 06/26/2024    CREATSERUM 1.49 (H) 06/26/2024    GFRNAA 38 (L) 06/20/2022    GFRAA 44 (L) 06/20/2022        Assessment and Plan:  Diagnoses and all orders for this visit:    Primary osteoarthritis of left knee  -     arthrocentesis major joint  -     triamcinolone acetonide (Kenalog-40) 40 MG/ML injection 40 mg    Other orders  -     XR KNEE (3 VIEWS), LEFT (CPT=73562); Future        Assessment: She has advanced osteoarthritis of the left knee.  She does not want to consider knee replacement yet.    Procedure: The risks and benefits of a cortisone injection were discussed with the patient.  An informational sheet was also provided and the patient had ample time to review it.  Under sterile preparation, the left knee was injected with 40 mg of Kenalog and 4 cc 0.5% marcaine.  The patient tolerated the procedure well.      Plan: She will follow-up with me as needed.  She did be an appropriate candidate for left knee replacement if she does not respond to conservative treatment of the right knee.    The above note was creating using Dragon speech recognition technology. Please excuse any typos.    ASTER GOMEZ MD       [1] No Known Allergies

## 2024-12-06 ENCOUNTER — TELEPHONE (OUTPATIENT)
Dept: INTERNAL MEDICINE CLINIC | Facility: CLINIC | Age: 79
End: 2024-12-06

## 2024-12-06 NOTE — TELEPHONE ENCOUNTER
Unable to reach patient for medication adherence consult. LVM for patient to call me back at [phone].

## 2024-12-09 NOTE — TELEPHONE ENCOUNTER
Reached patient for medication adherence consult. Patient overdue for refill on atorvastatin per insurance report.    Patient reports that she is taking the medication as prescribed. She reports tolerating the medication well. She endorses the need for refill but prefers to call her pharmacy herself.     Provided education on importance of adherence. Patient denies any questions or concerns with medication.

## 2025-01-14 ENCOUNTER — TELEPHONE (OUTPATIENT)
Dept: INTERNAL MEDICINE CLINIC | Facility: CLINIC | Age: 80
End: 2025-01-14

## 2025-01-14 DIAGNOSIS — Z01.10 ENCOUNTER FOR HEARING EXAMINATION WITHOUT ABNORMAL FINDINGS: Primary | ICD-10-CM

## 2025-01-14 NOTE — TELEPHONE ENCOUNTER
Patient is requesting referral.     Name of specialist and specialty department :  Audiology    Reason for visit with the specialist:  hearing test     Address of the specialist office: any   Appointment date:  any       CSS informed patient the turnaround time for referral is 5-7 business days.  Patient was informed to check their [employer] account for referral status.     Please call the patient when the order is confirmed .  Patient advised to schedule appointment with new provider.  Patient mentions she will call back to schedule an appointment.

## 2025-01-20 ENCOUNTER — OFFICE VISIT (OUTPATIENT)
Dept: INTERNAL MEDICINE CLINIC | Facility: CLINIC | Age: 80
End: 2025-01-20

## 2025-01-20 VITALS
WEIGHT: 187 LBS | SYSTOLIC BLOOD PRESSURE: 121 MMHG | HEIGHT: 67 IN | HEART RATE: 60 BPM | DIASTOLIC BLOOD PRESSURE: 73 MMHG | TEMPERATURE: 98 F | BODY MASS INDEX: 29.35 KG/M2 | OXYGEN SATURATION: 97 %

## 2025-01-20 DIAGNOSIS — Z12.31 ENCOUNTER FOR SCREENING MAMMOGRAM FOR MALIGNANT NEOPLASM OF BREAST: ICD-10-CM

## 2025-01-20 DIAGNOSIS — Z00.00 ANNUAL PHYSICAL EXAM: ICD-10-CM

## 2025-01-20 DIAGNOSIS — H53.9 VISION CHANGES: ICD-10-CM

## 2025-01-20 DIAGNOSIS — C50.911 HORMONE RECEPTOR POSITIVE MALIGNANT NEOPLASM OF RIGHT BREAST (HCC): ICD-10-CM

## 2025-01-20 DIAGNOSIS — E55.9 VITAMIN D DEFICIENCY: ICD-10-CM

## 2025-01-20 DIAGNOSIS — H91.90 SUBJECTIVE HEARING CHANGE: Primary | ICD-10-CM

## 2025-01-20 NOTE — PROGRESS NOTES
Subjective:   Patient ID: Kendal Abrams is a 79 year old female.    HPI    History/Other:   She came in today to establish care with new physician  She has history of osteoarthritis, history of right breast cancer status postmastectomy she does have follow-up appointment with oncology in April.    She needs referral for oncology and ophthalmology, and also needs referral audiology    Review of Systems   Constitutional: Negative.    HENT: Negative.     Eyes: Negative.    Respiratory: Negative.     Cardiovascular: Negative.    Gastrointestinal: Negative.    Genitourinary: Negative.    Musculoskeletal: Negative.    Neurological: Negative.    Hematological: Negative.    Psychiatric/Behavioral: Negative.       Current Outpatient Medications   Medication Sig Dispense Refill    atorvastatin 20 MG Oral Tab Take 1 tablet (20 mg total) by mouth every evening. 90 tablet 3    aspirin 81 MG Oral Tab EC Take 1 tablet (81 mg total) by mouth daily.      omeprazole 20 MG Oral Capsule Delayed Release Take 1 capsule (20 mg total) by mouth every morning before breakfast.      Vitamin D3 1000 units Oral Tab Take 1 tablet (1,000 Units total) by mouth daily.       Allergies:Allergies[1]    Objective:   Physical Exam  Vitals and nursing note reviewed.   Constitutional:       Appearance: Normal appearance.   HENT:      Head: Normocephalic and atraumatic.   Cardiovascular:      Rate and Rhythm: Normal rate and regular rhythm.      Pulses: Normal pulses.      Heart sounds: Normal heart sounds.   Pulmonary:      Effort: Pulmonary effort is normal.      Breath sounds: Normal breath sounds.   Abdominal:      Palpations: Abdomen is soft.   Musculoskeletal:         General: Normal range of motion.      Cervical back: Normal range of motion and neck supple.   Skin:     General: Skin is warm.   Neurological:      Mental Status: She is alert. Mental status is at baseline.   Psychiatric:         Mood and Affect: Mood normal.         Assessment &  Plan:   No diagnosis found.  H/o right breast cancer status postmastectomy follow up with oncology    S/p cataract - follow up with ophthalmology     Hearing change - follow up with audiology  No orders of the defined types were placed in this encounter.      Meds This Visit:  Requested Prescriptions      No prescriptions requested or ordered in this encounter       Imaging & Referrals:  None         [1] No Known Allergies

## 2025-02-10 ENCOUNTER — TELEPHONE (OUTPATIENT)
Dept: CASE MANAGEMENT | Age: 80
End: 2025-02-10

## 2025-02-10 DIAGNOSIS — M25.562 LEFT KNEE PAIN, UNSPECIFIED CHRONICITY: Primary | ICD-10-CM

## 2025-02-10 NOTE — TELEPHONE ENCOUNTER
Dr. Cannon,     Patient called requesting referral to Dr. Patiño for appointment 2/13/25.     Pended referral please review diagnosis and sign off if you agree.    Thank you.  Belinda De Los Santos  [facility]

## 2025-02-10 NOTE — TELEPHONE ENCOUNTER
Dr. Gauthier,     Patient is requesting an audiology referral at [facility] for a hearing test.     Pended referral please review diagnosis and sign off if you agree.    Thank you.  Belinda De Los Santos  Managed Care

## 2025-03-20 ENCOUNTER — OFFICE VISIT (OUTPATIENT)
Dept: PODIATRY CLINIC | Facility: CLINIC | Age: 80
End: 2025-03-20

## 2025-03-20 DIAGNOSIS — L60.3 NAIL DYSTROPHY: Primary | ICD-10-CM

## 2025-03-20 DIAGNOSIS — M79.672 PAIN IN BOTH FEET: ICD-10-CM

## 2025-03-20 DIAGNOSIS — L84 CALLUS OF FOOT: ICD-10-CM

## 2025-03-20 DIAGNOSIS — Q66.70 PES CAVUS: ICD-10-CM

## 2025-03-20 DIAGNOSIS — M79.671 PAIN IN BOTH FEET: ICD-10-CM

## 2025-03-20 PROCEDURE — 1126F AMNT PAIN NOTED NONE PRSNT: CPT | Performed by: STUDENT IN AN ORGANIZED HEALTH CARE EDUCATION/TRAINING PROGRAM

## 2025-03-20 PROCEDURE — 1159F MED LIST DOCD IN RCRD: CPT | Performed by: STUDENT IN AN ORGANIZED HEALTH CARE EDUCATION/TRAINING PROGRAM

## 2025-03-20 PROCEDURE — 99214 OFFICE O/P EST MOD 30 MIN: CPT | Performed by: STUDENT IN AN ORGANIZED HEALTH CARE EDUCATION/TRAINING PROGRAM

## 2025-03-20 NOTE — PROGRESS NOTES
[facility] Podiatry  Progress Note      Kendal Abrams is a 80 year old female.   Chief Complaint   Patient presents with    Callus     Bilateral foot - onset a couple of months ago - has pain walking rated as 6/10 - has a little tingling in her toes on and off              HPI:   Pleasant 80-year-old female presents to clinic for bilateral foot evaluation.  Patient admits to calluses to bilateral forefoot.  She also relates to elongated bilateral great toenails.  Denies any pedal injuries or trauma.      Allergies: Patient has no known allergies.    Current Outpatient Medications   Medication Sig Dispense Refill    atorvastatin 20 MG Oral Tab Take 1 tablet (20 mg total) by mouth every evening. 90 tablet 3    aspirin 81 MG Oral Tab EC Take 1 tablet (81 mg total) by mouth daily.      omeprazole 20 MG Oral Capsule Delayed Release Take 1 capsule (20 mg total) by mouth every morning before breakfast.      Vitamin D3 1000 units Oral Tab Take 1 tablet (1,000 Units total) by mouth daily.        Past Medical History:    Aortic atherosclerosis    CXR 1-18    Breast CA (HCC)    right dcis 2021    Chronic kidney disease, stage III (moderate) (HCC)    Deep vein thrombosis (HCC)    RLE, treated with Coumadin for 6 months    Ductal carcinoma in situ (DCIS) of right breast    Ductal carcinoma in situ, low nuclear grade, solid and cribriform type, with focal central necrosis and associated microcalcifications, multifocal, largest focus is 4.5 mm in greatest dimension; s/p right total mastectomy and right sentinel lymph node biopsy, right lymphoscintigraphy 3-21    GERD (gastroesophageal reflux disease)    EGD 11-16 with hiatal hernia and gastritis with biopsies benign and H. pylori testing negative    History of colon polyps    Hypercholesterolemia    Osteoarthritis of right knee    Status post right TKA 10-20    Varicose veins of both lower extremities    Visual impairment    readers      Past Surgical History:    Procedure Laterality Date    Biopsy of breast, needle core Left 2008    Fibroadenoma    Biopsy of breast, needle core Right 02/10/2021    cork clip    Biopsy of skin lesion  2018    upper mid back epidermal inclusion cyst    Cataract Right 2019    Cataract Left 2019    Colonoscopy      [facility]    Colonoscopy N/A 2023    Procedure: COLONOSCOPY;  Surgeon: Hugo Torres MD;  Location: [facility] ENDOSCOPY    Egd N/A 2016    Procedure: ESOPHAGOGASTRODUODENOSCOPY (EGD);  Surgeon: Lindsay Solorzano MD;  Location: [facility] ENDOSCOPY    Hemorrhoidectomy  1985    Knee arthroscopy Right     Mastectomy right      Mastectomy,simple Right 2021    Salpingectomy Left     Ectopic pregnancy    Sent lymph node biopsy Right 2021    Stab phlebectomy, varicose veins, 1 extremity; <20 incisions Right     Total knee replacement Right 10/09/2020      Family History   Problem Relation Age of Onset    Colon Cancer Father          age 35    Hypertension Mother     Glaucoma Mother     Diabetes Sister     Hypertension Sister     Glaucoma Sister     Breast Cancer Niece 50    Cancer Niece         thyroid cancer    Breast Cancer Self 77    Macular degeneration Neg     Ovarian Cancer Neg       Social History     Socioeconomic History    Marital status: Single    Number of children: 0   Occupational History    Occupation: Nurse - dialysis, [place]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  GENERAL: well developed, well nourished, in no apparent distress  EXTREMITIES:   1. Integument: Normal skin temperature and turgor.  Elongated bilateral great toenails with calluses x 6 to bilateral plantar feet.  2. Vascular: Dorsalis pedis two out of four bilateral and posterior tibial pulses two out of   four bilateral, capillary refill normal.   3. Musculoskeletal: All muscle groups are graded 5 out of 5 in the foot and ankle.   4. Neurological: Normal sharp dull sensation; reflexes normal.             ASSESSMENT AND PLAN:   Diagnoses and all orders for this visit:    Nail dystrophy    Pes cavus    Callus of foot    Pain in both feet        Plan:       -Patient examined, chart history reviewed.  -Discussed importance of proper pedal hygiene, regular foot checks, and tight glucose control.  -Sharply debrided nails x 2 with a sterile nail nipper achieving a 20% reduction in thickness and length, without incident.   -pared HPK x 6  to bety feet with sterile # 15 blade with no issues  -Ambulate with supportive shoes and inserts and avoid walking barefoot.  -Educated patient on acute signs of infection advised patient to seek immediate medical attention if symptoms arise.    RTC in 6 weeks       The patient indicates understanding of these issues and agrees to the plan.        Court Jackson DPM

## 2025-03-24 ENCOUNTER — MED REC SCAN ONLY (OUTPATIENT)
Dept: INTERNAL MEDICINE CLINIC | Facility: CLINIC | Age: 80
End: 2025-03-24

## 2025-03-28 ENCOUNTER — TELEPHONE (OUTPATIENT)
Dept: CASE MANAGEMENT | Age: 80
End: 2025-03-28

## 2025-03-28 DIAGNOSIS — M25.562 LEFT KNEE PAIN, UNSPECIFIED CHRONICITY: Primary | ICD-10-CM

## 2025-03-28 NOTE — TELEPHONE ENCOUNTER
Dr. Gauthier,     Patient requesting referral to Dr. Hendrix for a second opinion for left knee pain.    Pended referral please review diagnosis and sign off if you agree.    Thank you.  Belinda De Los Santos  Managed Care

## 2025-04-08 ENCOUNTER — HOSPITAL ENCOUNTER (OUTPATIENT)
Dept: MAMMOGRAPHY | Age: 80
Discharge: HOME | End: 2025-04-08
Attending: INTERNAL MEDICINE
Payer: MEDICARE

## 2025-04-08 DIAGNOSIS — Z12.31 ENCOUNTER FOR SCREENING MAMMOGRAM FOR MALIGNANT NEOPLASM OF BREAST: ICD-10-CM

## 2025-04-08 PROCEDURE — 77063 BREAST TOMOSYNTHESIS BI: CPT | Performed by: INTERNAL MEDICINE

## 2025-04-08 PROCEDURE — 77067 SCR MAMMO BI INCL CAD: CPT | Performed by: INTERNAL MEDICINE

## 2025-04-15 ENCOUNTER — OFFICE VISIT (OUTPATIENT)
Age: 80
End: 2025-04-15
Attending: INTERNAL MEDICINE
Payer: MEDICARE

## 2025-04-15 ENCOUNTER — APPOINTMENT (OUTPATIENT)
Dept: HEMATOLOGY/ONCOLOGY | Facility: HOSPITAL | Age: 80
End: 2025-04-15
Attending: INTERNAL MEDICINE
Payer: MEDICARE

## 2025-04-15 ENCOUNTER — LAB ENCOUNTER (OUTPATIENT)
Dept: LAB | Facility: HOSPITAL | Age: 80
End: 2025-04-15
Attending: INTERNAL MEDICINE
Payer: MEDICARE

## 2025-04-15 VITALS
TEMPERATURE: 97 F | RESPIRATION RATE: 16 BRPM | BODY MASS INDEX: 28.25 KG/M2 | HEIGHT: 67 IN | WEIGHT: 180 LBS | OXYGEN SATURATION: 98 % | SYSTOLIC BLOOD PRESSURE: 125 MMHG | DIASTOLIC BLOOD PRESSURE: 68 MMHG | HEART RATE: 61 BPM

## 2025-04-15 DIAGNOSIS — Z86.000 HISTORY OF DUCTAL CARCINOMA IN SITU (DCIS) OF BREAST: ICD-10-CM

## 2025-04-15 DIAGNOSIS — Z85.3 ENCOUNTER FOR FOLLOW-UP SURVEILLANCE OF BREAST CANCER: ICD-10-CM

## 2025-04-15 DIAGNOSIS — Z00.00 ANNUAL PHYSICAL EXAM: ICD-10-CM

## 2025-04-15 DIAGNOSIS — Z08 ENCOUNTER FOR FOLLOW-UP SURVEILLANCE OF BREAST CANCER: ICD-10-CM

## 2025-04-15 DIAGNOSIS — E55.9 VITAMIN D DEFICIENCY: ICD-10-CM

## 2025-04-15 DIAGNOSIS — Z90.11 H/O RIGHT MASTECTOMY: ICD-10-CM

## 2025-04-15 DIAGNOSIS — Z12.31 ENCOUNTER FOR SCREENING MAMMOGRAM FOR MALIGNANT NEOPLASM OF BREAST: Primary | ICD-10-CM

## 2025-04-15 PROBLEM — I82.409 DVT (DEEP VENOUS THROMBOSIS) (HCC): Status: RESOLVED | Noted: 2025-04-15 | Resolved: 2025-04-15

## 2025-04-15 PROBLEM — I82.409 DVT (DEEP VENOUS THROMBOSIS) (HCC): Status: ACTIVE | Noted: 2025-04-15

## 2025-04-15 PROBLEM — C50.919 BREAST CANCER (HCC): Status: RESOLVED | Noted: 2021-03-09 | Resolved: 2025-04-15

## 2025-04-15 PROBLEM — D05.11 DUCTAL CARCINOMA IN SITU (DCIS) OF RIGHT BREAST: Status: RESOLVED | Noted: 2021-02-01 | Resolved: 2025-04-15

## 2025-04-15 LAB
ALBUMIN SERPL-MCNC: 4.4 G/DL (ref 3.2–4.8)
ALBUMIN/GLOB SERPL: 1.7 {RATIO} (ref 1–2)
ALP LIVER SERPL-CCNC: 55 U/L (ref 55–142)
ALT SERPL-CCNC: 14 U/L (ref 10–49)
ANION GAP SERPL CALC-SCNC: 10 MMOL/L (ref 0–18)
AST SERPL-CCNC: 20 U/L (ref ?–34)
BASOPHILS # BLD AUTO: 0.02 X10(3) UL (ref 0–0.2)
BASOPHILS NFR BLD AUTO: 0.6 %
BILIRUB SERPL-MCNC: 0.7 MG/DL (ref 0.2–1.1)
BUN BLD-MCNC: 16 MG/DL (ref 9–23)
BUN/CREAT SERPL: 11.3 (ref 10–20)
CALCIUM BLD-MCNC: 9 MG/DL (ref 8.7–10.4)
CHLORIDE SERPL-SCNC: 108 MMOL/L (ref 98–112)
CHOLEST SERPL-MCNC: 209 MG/DL (ref ?–200)
CO2 SERPL-SCNC: 24 MMOL/L (ref 21–32)
CREAT BLD-MCNC: 1.41 MG/DL (ref 0.55–1.02)
DEPRECATED RDW RBC AUTO: 42.3 FL (ref 35.1–46.3)
EGFRCR SERPLBLD CKD-EPI 2021: 37 ML/MIN/1.73M2 (ref 60–?)
EOSINOPHIL # BLD AUTO: 0.08 X10(3) UL (ref 0–0.7)
EOSINOPHIL NFR BLD AUTO: 2.5 %
ERYTHROCYTE [DISTWIDTH] IN BLOOD BY AUTOMATED COUNT: 12.8 % (ref 11–15)
EST. AVERAGE GLUCOSE BLD GHB EST-MCNC: 131 MG/DL (ref 68–126)
FASTING PATIENT LIPID ANSWER: YES
FASTING STATUS PATIENT QL REPORTED: YES
GLOBULIN PLAS-MCNC: 2.6 G/DL (ref 2–3.5)
GLUCOSE BLD-MCNC: 107 MG/DL (ref 70–99)
HBA1C MFR BLD: 6.2 % (ref ?–5.7)
HCT VFR BLD AUTO: 38.8 % (ref 35–48)
HDLC SERPL-MCNC: 62 MG/DL (ref 40–59)
HGB BLD-MCNC: 12.9 G/DL (ref 12–16)
IMM GRANULOCYTES # BLD AUTO: 0 X10(3) UL (ref 0–1)
IMM GRANULOCYTES NFR BLD: 0 %
LDLC SERPL CALC-MCNC: 133 MG/DL (ref ?–100)
LYMPHOCYTES # BLD AUTO: 1.49 X10(3) UL (ref 1–4)
LYMPHOCYTES NFR BLD AUTO: 45.7 %
MCH RBC QN AUTO: 29.9 PG (ref 26–34)
MCHC RBC AUTO-ENTMCNC: 33.2 G/DL (ref 31–37)
MCV RBC AUTO: 89.8 FL (ref 80–100)
MONOCYTES # BLD AUTO: 0.31 X10(3) UL (ref 0.1–1)
MONOCYTES NFR BLD AUTO: 9.5 %
NEUTROPHILS # BLD AUTO: 1.36 X10 (3) UL (ref 1.5–7.7)
NEUTROPHILS # BLD AUTO: 1.36 X10(3) UL (ref 1.5–7.7)
NEUTROPHILS NFR BLD AUTO: 41.7 %
NONHDLC SERPL-MCNC: 147 MG/DL (ref ?–130)
OSMOLALITY SERPL CALC.SUM OF ELEC: 296 MOSM/KG (ref 275–295)
PLATELET # BLD AUTO: 210 10(3)UL (ref 150–450)
POTASSIUM SERPL-SCNC: 4.3 MMOL/L (ref 3.5–5.1)
PROT SERPL-MCNC: 7 G/DL (ref 5.7–8.2)
RBC # BLD AUTO: 4.32 X10(6)UL (ref 3.8–5.3)
SODIUM SERPL-SCNC: 142 MMOL/L (ref 136–145)
TRIGL SERPL-MCNC: 80 MG/DL (ref 30–149)
TSI SER-ACNC: 0.79 UIU/ML (ref 0.55–4.78)
VIT D+METAB SERPL-MCNC: 41.1 NG/ML (ref 30–100)
VLDLC SERPL CALC-MCNC: 14 MG/DL (ref 0–30)
WBC # BLD AUTO: 3.3 X10(3) UL (ref 4–11)

## 2025-04-15 PROCEDURE — 82306 VITAMIN D 25 HYDROXY: CPT

## 2025-04-15 PROCEDURE — 84443 ASSAY THYROID STIM HORMONE: CPT

## 2025-04-15 PROCEDURE — 85025 COMPLETE CBC W/AUTO DIFF WBC: CPT

## 2025-04-15 PROCEDURE — 80053 COMPREHEN METABOLIC PANEL: CPT

## 2025-04-15 PROCEDURE — 83036 HEMOGLOBIN GLYCOSYLATED A1C: CPT

## 2025-04-15 PROCEDURE — 36415 COLL VENOUS BLD VENIPUNCTURE: CPT

## 2025-04-15 PROCEDURE — 80061 LIPID PANEL: CPT

## 2025-04-15 NOTE — PROGRESS NOTES
[facility] Hematology Oncology  Follow up Note    Patient Name: Kendal Abrams   YOB: 1945   Medical Record Number: [id number]   CSN: [id number]   Attending Physician: Symone Quinn MD     Date of Visit: 4/15/2025     Chief Complaint:  DCIS       Oncologic History  80 year old female, former patient of Dr. Rosales, seen for ductal carcinoma in situ of the right breast status post right mastectomy with Dr. Bueno March 2021     Interval History:  She returns for annual follow-up and to review mammogram. She is feeling well overall.  She denies any breast changes or concerns. She denies any bone pain or unintentional weight loss. She is active and independent in all ADL.     Performance Status: ECOG 0    Current Medications:  Medications - Current[1]       Review of Systems:  10 -point systems reviewed, all negative except as mentioned in the HPI/interval history.     Vital Signs:  /68 (BP Location: Left arm, Patient Position: Sitting, Cuff Size: adult)   Pulse 61   Temp 97 °F (36.1 °C) (Tympanic)   Resp 16   Ht 1.702 m (5' 7\")   Wt 81.6 kg (180 lb)   SpO2 98%   BMI 28.19 kg/m²     Physical Examination:  General: Aert and oriented x 3, not in acute distress.  Psych:  Mood and affect appropriate  HEENT: Non-icteric sclera. Oropharynx is clear.   Neck: No palpable lymphadenopathy.   Lymphatics: No palpable lymphadenopathy in the cervical, supraclavicular, axillary regions.  Breast: S/p right mastectomy, chest wall with no palpable masses or tenderness. Skin intact. Left breat with no palpable masses or nipple changes.   Chest: Clear   Heart: Regular   Abdomen: Soft  Extremities: No peripheral edema.  Neurological: Grossly intact.     Laboratory:  Lab Results   Component Value Date    WBC 3.3 (L) 04/15/2025    RBC 4.32 04/15/2025    HGB 12.9 04/15/2025    HCT 38.8 04/15/2025    MCV 89.8 04/15/2025    MCH 29.9 04/15/2025    MCHC 33.2 04/15/2025    RDW 12.8 04/15/2025    .0  04/15/2025    MPV 8.6 01/06/2018     Lab Results   Component Value Date     06/26/2024    K 4.6 06/26/2024     06/26/2024    CO2 25.0 06/26/2024    BUN 20 06/26/2024    CREATSERUM 1.49 (H) 06/26/2024    GLU 96 06/26/2024    CA 9.2 06/26/2024    ALKPHO 59 06/26/2024    ALT 14 06/26/2024    AST 21 06/26/2024    BILT 0.9 06/26/2024    ALB 4.3 06/26/2024    TP 7.3 06/26/2024       Radiology:  [facility] SINDY 2D+3D SCREENING LEFT (CPT=77067-52/61218)  Result Date: 4/10/2025  CONCLUSION:   No mammographic evidence for malignancy.  As long as the patient's clinical breast exam remains unchanged, annual screening mammogram is recommended.   BI-RADS CATEGORY:   DIAGNOSTIC CATEGORY 2 - BENIGN FINDING:   RECOMMENDATIONS:  ROUTINE MAMMOGRAM AND CLINICAL EVALUATION IN 12 MONTHS.       PLEASE NOTE: NORMAL MAMMOGRAM DOES NOT EXCLUDE THE POSSIBILITY OF BREAST CANCER.  A CLINICALLY SUSPICIOUS PALPABLE LUMP SHOULD BE BIOPSIED.   For patients over the age of 40, the target due date for the patient's next mammogram has been entered into a reminder system.   Patient received a discharge summary from the technologist after completion of exam.  Breast marker legend used on images  Triangle = Palpable lump [place] = Skin tag or mole BB = Nipple Linear geneva = Scar Square = Pain    Dictated by (CST): Veronica Qiu MD on 4/10/2025 at 4:47 PM     Finalized by (CST): Veronica Qiu MD on 4/10/2025 at 4:53 PM      [address], [address], [place] [address] [phone]       Impression and Plan:    80 year old female with history of ER+ ductal carcinoma in situ of the right breast status post mastectomy and sentinel node biopsy with Dr. Bueno March 2021  -She did not receive endocrine therapy for chemoprevention.   -Left screening mammogram benign. Repeat annually.   -Ok to discharged from oncology clinic at this point and follow up with her PCP for continued surveillance, but she wishes to continue  her annual follow ups here.       Return in about 1 year (around 4/15/2026).       Symone Quinn MD   [facility] Hematology Oncology            [1]   Current Outpatient Medications:     atorvastatin 20 MG Oral Tab, Take 1 tablet (20 mg total) by mouth every evening., Disp: 90 tablet, Rfl: 3    aspirin 81 MG Oral Tab EC, Take 1 tablet (81 mg total) by mouth in the morning., Disp: , Rfl:     omeprazole 20 MG Oral Capsule Delayed Release, Take 1 capsule (20 mg total) by mouth every morning before breakfast., Disp: , Rfl:     Vitamin D3 1000 units Oral Tab, Take 1 tablet (1,000 Units total) by mouth in the morning., Disp: , Rfl:

## 2025-04-23 ENCOUNTER — OFFICE VISIT (OUTPATIENT)
Dept: INTERNAL MEDICINE CLINIC | Facility: CLINIC | Age: 80
End: 2025-04-23

## 2025-04-23 VITALS
HEART RATE: 57 BPM | TEMPERATURE: 98 F | DIASTOLIC BLOOD PRESSURE: 69 MMHG | BODY MASS INDEX: 28.7 KG/M2 | OXYGEN SATURATION: 100 % | WEIGHT: 185 LBS | SYSTOLIC BLOOD PRESSURE: 114 MMHG | HEIGHT: 67.5 IN

## 2025-04-23 DIAGNOSIS — Z78.0 MENOPAUSE: ICD-10-CM

## 2025-04-23 DIAGNOSIS — Z00.00 MEDICARE ANNUAL WELLNESS VISIT, SUBSEQUENT: Primary | ICD-10-CM

## 2025-04-23 DIAGNOSIS — D72.819 LEUKOPENIA, UNSPECIFIED TYPE: ICD-10-CM

## 2025-04-23 DIAGNOSIS — Z00.00 ENCOUNTER FOR ANNUAL HEALTH EXAMINATION: ICD-10-CM

## 2025-04-23 DIAGNOSIS — N18.30 STAGE 3 CHRONIC KIDNEY DISEASE, UNSPECIFIED WHETHER STAGE 3A OR 3B CKD (HCC): ICD-10-CM

## 2025-04-23 PROCEDURE — 1159F MED LIST DOCD IN RCRD: CPT | Performed by: INTERNAL MEDICINE

## 2025-04-23 PROCEDURE — 3008F BODY MASS INDEX DOCD: CPT | Performed by: INTERNAL MEDICINE

## 2025-04-23 PROCEDURE — 1125F AMNT PAIN NOTED PAIN PRSNT: CPT | Performed by: INTERNAL MEDICINE

## 2025-04-23 PROCEDURE — G0439 PPPS, SUBSEQ VISIT: HCPCS | Performed by: INTERNAL MEDICINE

## 2025-04-23 PROCEDURE — 3078F DIAST BP <80 MM HG: CPT | Performed by: INTERNAL MEDICINE

## 2025-04-23 PROCEDURE — 3074F SYST BP LT 130 MM HG: CPT | Performed by: INTERNAL MEDICINE

## 2025-04-23 PROCEDURE — 96160 PT-FOCUSED HLTH RISK ASSMT: CPT | Performed by: INTERNAL MEDICINE

## 2025-04-23 PROCEDURE — 1170F FXNL STATUS ASSESSED: CPT | Performed by: INTERNAL MEDICINE

## 2025-04-23 NOTE — PROGRESS NOTES
Subjective:   Kendal Abrams is a 80 year old female who presents for a MA AHA (Medicare Advantage Annual Health Assessment) and Subsequent Annual Wellness visit (Pt already had Initial Annual Wellness) and scheduled follow up of multiple significant but stable problems.   History of Present Illness              History/Other:   Fall Risk Assessment:   [unfilled]     Cognitive Assessment:   Abnormal  What day of the week is this?: Correct  What month is it?: Correct  What year is it?: Correct  Recall \"Ball\": Correct  Recall \"Flag\": Incorrect  Recall \"Tree\": Correct      Functional Ability/Status:   Kendal Abrams has some abnormal functions as listed below:  She has Hearing problems based on screening of functional status.She has Walking problems based on screening of functional status.       Depression Screening (PHQ):  PHQ-2 SCORE: 0  , done 4/23/2025        5 minutes spent screening and counseling for depression    Advanced Directives:   She does have a Living Will but we do NOT have it on file in Epic.    She does NOT have a Power of  for Health Care. [Do you have a healthcare power of ?: No]  Discussed Advance Care Planning with patient (and family/surrogate if present). Standard forms made available to patient in After Visit Summary.      Problem List[1]  Allergies:  She has no known allergies.    Current Medications:  Active Meds, Sig Only[2]    Medical History:  She  has a past medical history of Aortic atherosclerosis, Breast CA (HCC), Chronic kidney disease, stage III (moderate) (HCC), Deep vein thrombosis (HCC) (2005), Ductal carcinoma in situ (DCIS) of right breast (02/10/2021), GERD (gastroesophageal reflux disease), History of colon polyps, Hypercholesterolemia, Osteoarthritis of right knee, Varicose veins of both lower extremities, and Visual impairment.  Surgical History:  She  has a past surgical history that includes hemorrhoidectomy (1985); colonoscopy (2014); egd (N/A,  11/17/2016); knee arthroscopy (Right, 1997); salpingectomy (Left, 1995); stab phlebectomy, varicose veins, 1 extremity; <20 incisions (Right); biopsy of breast, needle core (Left, 01/24/2008); biopsy of skin lesion (06/20/2018); cataract (Right, 02/2019); cataract (Left, 03/2019); total knee replacement (Right, 10/09/2020); biopsy of breast, needle core (Right, 02/10/2021); sent lymph node biopsy (Right, 03/09/2021); mastectomy,simple (Right, 03/09/2021); colonoscopy (N/A, 11/17/2023); and mastectomy right.   Family History:  Her family history includes Breast Cancer (age of onset: 50) in her niece; Breast Cancer (age of onset: 77) in her self; Cancer in her niece; Colon Cancer in her father; Diabetes in her sister; Glaucoma in her mother and sister; Hypertension in her mother and sister.  Social History:  She  reports that she quit smoking about 49 years ago. Her smoking use included cigarettes. She started smoking about 51 years ago. She has a 1 pack-year smoking history. She has never used smokeless tobacco. She reports current alcohol use. She reports that she does not use drugs.    Tobacco:  She smoked tobacco in the past but quit greater than 12 months ago.  Tobacco Use[3]     CAGE Alcohol Screen:   CAGE screening score of 0 on 4/23/2025, showing low risk of alcohol abuse.      Patient Care Team:  Charisse Gauthier MD as PCP - General (Internal Medicine)  Avery Victoria PT as Physical Therapist (Physical Therapy)  Florinda Mortensen PTA as Physical Therapy Assistant (Physical Therapy)  Maryanne Chambers, PT as Physical Therapist (Physical Therapy)  Vicky Whiteside PTA as Physical Therapy Assistant (Physical Therapy)    Review of Systems  GENERAL: feels well otherwise  SKIN: denies any unusual skin lesions  EYES: denies blurred vision or double vision  HEENT: denies nasal congestion, sinus pain or ST  LUNGS: denies shortness of breath with exertion  CARDIOVASCULAR: denies chest pain on  exertion  GI: denies abdominal pain, denies heartburn  : denies dysuria, vaginal discharge or itching, no complaint of urinary incontinence   MUSCULOSKELETAL: denies back pain  NEURO: denies headaches  PSYCHE: denies depression or anxiety  HEMATOLOGIC: denies hx of anemia  ENDOCRINE: denies thyroid history  ALL/ASTHMA: denies hx of allergy or asthma    Objective:   Physical Exam  General Appearance:  Alert, cooperative, no distress, appears stated age   Head:  Normocephalic, without obvious abnormality, atraumatic   Eyes:  PERRL, conjunctiva/corneas clear, EOM's intact both eyes   Ears:  Normal TM's and external ear canals, both ears   Nose: Nares normal, septum midline,mucosa normal, no drainage or sinus tenderness   Throat: Lips, mucosa, and tongue normal; teeth and gums normal   Neck: Supple, symmetrical, trachea midline, no adenopathy;  thyroid: not enlarged, symmetric, no tenderness/mass/nodules; no carotid bruit or JVD   Back:   Symmetric, no curvature, ROM normal, no CVA tenderness   Lungs:   Clear to auscultation bilaterally, respirations unlabored   Heart:  Regular rate and rhythm, S1 and S2 normal, no murmur, rub, or gallop   Abdomen:   Soft, non-tender, bowel sounds active all four quadrants,  no masses, no organomegaly   Pelvic: Deferred   Extremities: Extremities normal, atraumatic, no cyanosis or edema   Pulses: 2+ and symmetric   Skin: Skin color, texture, turgor normal, no rashes or lesions   Lymph nodes: Cervical, supraclavicular, and axillary nodes normal   Neurologic: Normal       /69 (BP Location: Right arm, Patient Position: Sitting, Cuff Size: large)   Pulse 57   Temp 97.7 °F (36.5 °C) (Oral)   Ht 5' 7.5\" (1.715 m)   Wt 185 lb (83.9 kg)   SpO2 100%   BMI 28.55 kg/m²  Estimated body mass index is 28.55 kg/m² as calculated from the following:    Height as of this encounter: 5' 7.5\" (1.715 m).    Weight as of this encounter: 185 lb (83.9 kg).    Medicare Hearing Assessment:   Hearing  Screening    Time taken: 4/23/2025  9:18 AM  Entry User: Zhanna Crouch  Screening Method: Finger Rub  Finger Rub Result: Pass         Visual Acuity:   Right Eye Visual Acuity: Uncorrected Right Eye Chart Acuity: 20/30   Left Eye Visual Acuity: Uncorrected Left Eye Chart Acuity: 20/30   Both Eyes Visual Acuity: Uncorrected Both Eyes Chart Acuity: 20/30   Able To Tolerate Visual Acuity: Yes        Assessment & Plan:   Kendal Abrams is a 80 year old female who presents for a Medicare Assessment.     1. Medicare annual wellness visit, subsequent (Primary)  2. Stage 3 chronic kidney disease, unspecified whether stage 3a or 3b CKD (HCC) pramod order us kidney, repeat bmp in 3 months  -     US KIDNEYS (CPT=76775); Future; Expected date: 04/23/2025  -     Basic Metabolic Panel (8); Future; Expected date: 04/23/2025  3. Encounter for annual health examination  4. Menopause bone scan  -     XR DEXA BONE DENSITOMETRY (CPT=77080); Future; Expected date: 04/23/2025  5. Leukopenia, unspecified type - most likley ethincal low wbc, repeat in 6 month  -     CBC With Differential With Platelet; Future; Expected date: 04/23/2025  [unfilled]          6 aortic atherosclerosis- asymptomatic, monitor   7 hypercholesterolemia- will check lipid panel  8 h/o breast cancer- stable   The patient indicates understanding of these issues and agrees to the plan.  Reinforced healthy diet, lifestyle, and exercise.      No follow-ups on file.     ASHA JIMENEZ MD, 4/23/2025     Supplementary Documentation:   General Health:  In the past six months, have you lost more than 10 pounds without trying?: 2 - No  Has your appetite been poor?: No  Type of Diet: Balanced  How does the patient maintain a good energy level?: Appropriate Exercise  How would you describe your daily physical activity?: Moderate  How would you describe your current health state?: Good  How do you maintain positive mental well-being?: Social Interaction, Games, Visiting  Friends, Visiting Family  On a scale of 0 to 10, with 0 being no pain and 10 being severe pain, what is your pain level?: 1 - (Mild)  In the past six months, have you experienced urine leakage?: 0-No  At any time do you feel concerned for the safety/well-being of yourself and/or your children, in your home or elsewhere?: No  Have you had any immunizations at another office such as Influenza, Hepatitis B, Tetanus, or Pneumococcal?: Yes    Health Maintenance   Topic Date Due    Annual Well Visit  01/01/2025    COVID-19 Vaccine (9 - 2024-25 season) 03/05/2025    Colorectal Cancer Screening  11/17/2026    Influenza Vaccine  Completed    DEXA Scan  Completed    Annual Depression Screening  Completed    Fall Risk Screening (Annual)  Completed    Pneumococcal Vaccine: 50+ Years  Completed    Zoster Vaccines  Completed    Meningococcal B Vaccine  Aged Out            [1]   Patient Active Problem List  Diagnosis    Hx of colonic polyps    Family history of colon cancer    Hypercholesterolemia    GERD (gastroesophageal reflux disease)    Chronic kidney disease, stage III (moderate) (HCC)    Aortic atherosclerosis    Vitamin D deficiency    Multinodular goiter    Renal cyst    History of DVT (deep vein thrombosis)    Former tobacco use    Osteoarthritis of right knee    Primary osteoarthritis of right knee    Orthopedic aftercare    Left knee pain    History of breast cancer   [2]   Outpatient Medications Marked as Taking for the 4/23/25 encounter (Office Visit) with Charisse Gauthier MD   Medication Sig    atorvastatin 20 MG Oral Tab Take 1 tablet (20 mg total) by mouth every evening.    aspirin 81 MG Oral Tab EC Take 1 tablet (81 mg total) by mouth in the morning.    omeprazole 20 MG Oral Capsule Delayed Release Take 1 capsule (20 mg total) by mouth every morning before breakfast.    Vitamin D3 1000 units Oral Tab Take 1 tablet (1,000 Units total) by mouth in the morning.   [3]   Social History  Tobacco Use   Smoking Status  Former    Current packs/day: 0.00    Average packs/day: 0.5 packs/day for 2.0 years (1.0 ttl pk-yrs)    Types: Cigarettes    Start date: 1973    Quit date: 1975    Years since quittin.4   Smokeless Tobacco Never

## 2025-04-30 NOTE — PROGRESS NOTES
Daily Note     Today's date: 2025  Patient name: Demetrio Villalobos  : 1963  MRN: [id number]  Referring provider: César Malone DO  Dx:   Encounter Diagnoses   Name Primary?    Cerebrovascular accident (CVA), unspecified mechanism (HCC) Yes    Spastic hemiparesis affecting nondominant side (HCC)            Start Time: 1500  Stop Time: 1545  Total time in clinic (min): 45 minutes    PLAN OF CARE START: 24  PLAN OF CARE END: 25  FREQUENCY: 2 times per week  PRECAUTIONS no restrictions with RUE as per PT vicky, HTN    POC expires Auth Status Total   Visits  Start date  Expiration date PT/OT + Visit Limit? Co-Pay    approved   $0    approved                                       Visit/Unit Tracking  AUTH Status:  Date            Visits  Authed: 12 Used 1 1 1 1 1           Remaining  11 10 9 8 7               Subjective: Pt reports he put up signs to remind him to use his left arm during tasks such as opening the door.     Objective: See treatment below.    NMR:  Completed Primer:  - 5 min warm up on NuStep at level 1, 30 seconds fast, 30 seconds slow withLUE    Completed with 2 lb wrist weight:  -Completed large snowflake pull apart with FES on wrist/digit extensors focused on FMC, dexterity, bimanual coordination, and grasp/release. Pt completed with independence for 2 sets  - Completed board of locks with FES on wrist/digit extensors focused on FMC, dexterity, bimanual coordination, and motor planning. Pt required min assistance with FMC and dexterity unlatching 3 locks.      - Completed forearm rotations with hammer focused on pronation/supination. Pt required min assistance to prevent compensation patterns of shoulder hiking and abduction.      Assessment: Tolerated treatment well. Pt demonstrated difficulties with FMC and dexterity requiring minimal assistance on 3 locks. Pt demonstrated compensation patterns during forearm pronation/supination  HPI:    Patient ID: Nolan Bee is a 67year old female. HPI  This pleasant 78-year-old female presents as a new patient to me and states that she is referred by Isabelle Torres. Patient's chief concern she states that she wants her hammertoes corrected. activity requiring therapist assistance to prevent. Pt would benefit from continued OT services to address NMR L UE s/p chronic CVA.      Plan: Continued skilled OT per POC.              surgical considerations. My expectation is a soft tissue release of the metatarsal phalangeal joint and arthroplasty with fixation of the second and third toes.   I reviewed the procedure postoperative course potential concerns and complications and I expe

## 2025-05-06 ENCOUNTER — OFFICE VISIT (OUTPATIENT)
Dept: PODIATRY CLINIC | Facility: CLINIC | Age: 80
End: 2025-05-06

## 2025-05-06 DIAGNOSIS — L84 CALLUS OF FOOT: ICD-10-CM

## 2025-05-06 DIAGNOSIS — Q66.70 PES CAVUS: ICD-10-CM

## 2025-05-06 DIAGNOSIS — M79.671 PAIN IN BOTH FEET: Primary | ICD-10-CM

## 2025-05-06 DIAGNOSIS — M79.672 PAIN IN BOTH FEET: Primary | ICD-10-CM

## 2025-05-06 PROCEDURE — 1159F MED LIST DOCD IN RCRD: CPT | Performed by: STUDENT IN AN ORGANIZED HEALTH CARE EDUCATION/TRAINING PROGRAM

## 2025-05-06 PROCEDURE — 99213 OFFICE O/P EST LOW 20 MIN: CPT | Performed by: STUDENT IN AN ORGANIZED HEALTH CARE EDUCATION/TRAINING PROGRAM

## 2025-05-06 NOTE — PROGRESS NOTES
[facility] Podiatry  Progress Note      Kendal Abrams is a 80 year old female.   Chief Complaint   Patient presents with    Callus     Pt in for f/u on callus- have grown and causing pain, feels roughness             HPI:   Pleasant 80-year-old female presents to clinic for bilateral foot evaluation.  Patient admits to calluses to bilateral forefoot.  Admits that she has also been using the pumice stone at home.     Allergies: Patient has no known allergies.    Current Outpatient Medications   Medication Sig Dispense Refill    atorvastatin 20 MG Oral Tab Take 1 tablet (20 mg total) by mouth every evening. 90 tablet 3    aspirin 81 MG Oral Tab EC Take 1 tablet (81 mg total) by mouth in the morning.      omeprazole 20 MG Oral Capsule Delayed Release Take 1 capsule (20 mg total) by mouth every morning before breakfast.      Vitamin D3 1000 units Oral Tab Take 1 tablet (1,000 Units total) by mouth in the morning.        Past Medical History:    Aortic atherosclerosis    CXR 1-18    Breast CA (HCC)    right dcis 2021    Chronic kidney disease, stage III (moderate) (HCC)    Deep vein thrombosis (HCC)    RLE, treated with Coumadin for 6 months    Ductal carcinoma in situ (DCIS) of right breast    Ductal carcinoma in situ, low nuclear grade, solid and cribriform type, with focal central necrosis and associated microcalcifications, multifocal, largest focus is 4.5 mm in greatest dimension; s/p right total mastectomy and right sentinel lymph node biopsy, right lymphoscintigraphy 3-21    GERD (gastroesophageal reflux disease)    EGD 11-16 with hiatal hernia and gastritis with biopsies benign and H. pylori testing negative    History of colon polyps    Hypercholesterolemia    Osteoarthritis of right knee    Status post right TKA 10-20    Varicose veins of both lower extremities    Visual impairment    readers      Past Surgical History:   Procedure Laterality Date    Biopsy of breast, needle core Left 01/24/2008     Fibroadenoma    Biopsy of breast, needle core Right 02/10/2021    cork clip    Biopsy of skin lesion  2018    upper mid back epidermal inclusion cyst    Cataract Right 2019    Cataract Left 2019    Colonoscopy      [facility]    Colonoscopy N/A 2023    Procedure: COLONOSCOPY;  Surgeon: Hugo Torres MD;  Location: [facility] ENDOSCOPY    Egd N/A 2016    Procedure: ESOPHAGOGASTRODUODENOSCOPY (EGD);  Surgeon: Lindsay Solorzano MD;  Location: [facility] ENDOSCOPY    Hemorrhoidectomy  1985    Knee arthroscopy Right     Mastectomy right      Mastectomy,simple Right 2021    Salpingectomy Left     Ectopic pregnancy    Sent lymph node biopsy Right 2021    Stab phlebectomy, varicose veins, 1 extremity; <20 incisions Right     Total knee replacement Right 10/09/2020      Family History   Problem Relation Age of Onset    Colon Cancer Father          age 35    Hypertension Mother     Glaucoma Mother     Diabetes Sister     Hypertension Sister     Glaucoma Sister     Breast Cancer Niece 50    Cancer Niece         thyroid cancer    Breast Cancer Self 77    Macular degeneration Neg     Ovarian Cancer Neg       Social History     Socioeconomic History    Marital status: Single    Number of children: 0   Occupational History    Occupation: Nurse - dialysis, OR     Comment: retired   Tobacco Use    Smoking status: Former     Current packs/day: 0.00     Average packs/day: 0.5 packs/day for 2.0 years (1.0 ttl pk-yrs)     Types: Cigarettes     Start date: 1973     Quit date: 1975     Years since quittin.5    Smokeless tobacco: Never   Vaping Use    Vaping status: Never Used   Substance and Sexual Activity    Alcohol use: Yes     Comment: Occasional    Drug use: No           REVIEW OF SYSTEMS:     Denies nause, fever, chills  No calf pain  Denies chest pain or SOB      EXAM:   There were no vitals taken for this visit.  GENERAL: well developed, well  nourished, in no apparent distress  EXTREMITIES:   1. Integument: Normal skin temperature and turgor.  HPKs to bety plantar feet.  2. Vascular: Dorsalis pedis two out of four bilateral and posterior tibial pulses two out of   four bilateral, capillary refill normal.   3. Musculoskeletal: All muscle groups are graded 5 out of 5 in the foot and ankle.   4. Neurological: Normal sharp dull sensation; reflexes normal.             ASSESSMENT AND PLAN:   Diagnoses and all orders for this visit:    Pain in both feet    Pes cavus    Callus of foot          Plan:       -Patient examined, chart history reviewed.  -Discussed importance of proper pedal hygiene, regular foot checks, and tight glucose control.  -pared HPK x 6  to bety feet with sterile # 15 blade with no issues  -Ambulate with supportive shoes and inserts and avoid walking barefoot.  -Educated patient on acute signs of infection advised patient to seek immediate medical attention if symptoms arise.    RTC in 6 weeks       The patient indicates understanding of these issues and agrees to the plan.        Court Jackson DPM

## 2025-05-21 ENCOUNTER — TELEPHONE (OUTPATIENT)
Dept: OTOLARYNGOLOGY | Facility: CLINIC | Age: 80
End: 2025-05-21

## 2025-05-27 ENCOUNTER — HOSPITAL ENCOUNTER (OUTPATIENT)
Dept: ULTRASOUND IMAGING | Facility: HOSPITAL | Age: 80
Discharge: HOME | End: 2025-05-27
Attending: INTERNAL MEDICINE
Payer: MEDICARE

## 2025-05-27 ENCOUNTER — HOSPITAL ENCOUNTER (OUTPATIENT)
Dept: BONE DENSITY | Facility: HOSPITAL | Age: 80
Discharge: HOME | End: 2025-05-27
Attending: INTERNAL MEDICINE
Payer: MEDICARE

## 2025-05-27 DIAGNOSIS — Z78.0 MENOPAUSE: ICD-10-CM

## 2025-05-27 DIAGNOSIS — N18.30 STAGE 3 CHRONIC KIDNEY DISEASE, UNSPECIFIED WHETHER STAGE 3A OR 3B CKD (HCC): ICD-10-CM

## 2025-05-27 PROCEDURE — 76775 US EXAM ABDO BACK WALL LIM: CPT | Performed by: INTERNAL MEDICINE

## 2025-05-27 PROCEDURE — 77080 DXA BONE DENSITY AXIAL: CPT | Performed by: INTERNAL MEDICINE

## 2025-06-10 ENCOUNTER — HOSPITAL ENCOUNTER (OUTPATIENT)
Age: 80
End: 2025-06-10
Attending: ORTHOPAEDIC SURGERY | Admitting: ORTHOPAEDIC SURGERY

## 2025-06-13 ENCOUNTER — ORDER TRANSCRIPTION (OUTPATIENT)
Dept: PHYSICAL THERAPY | Facility: HOSPITAL | Age: 80
End: 2025-06-13

## 2025-06-13 DIAGNOSIS — Z96.659 STATUS POST TOTAL KNEE REPLACEMENT: Primary | ICD-10-CM

## 2025-06-16 ENCOUNTER — TELEPHONE (OUTPATIENT)
Dept: INTERNAL MEDICINE CLINIC | Facility: CLINIC | Age: 80
End: 2025-06-16

## 2025-06-16 DIAGNOSIS — M79.672 BILATERAL FOOT PAIN: Primary | ICD-10-CM

## 2025-06-16 DIAGNOSIS — L84 CALLUS OF FOOT: ICD-10-CM

## 2025-06-16 DIAGNOSIS — M79.671 BILATERAL FOOT PAIN: Primary | ICD-10-CM

## 2025-06-16 NOTE — TELEPHONE ENCOUNTER
Dr Gauthier,     Patient called requesting referral to Dr. Jackson.     Pended referral please review diagnosis and sign off if you agree.    Thank you.  Belinda De Los Santos  [facility]

## 2025-06-16 NOTE — TELEPHONE ENCOUNTER
Patient called again requesting this referral. She is scheduled for an appointment on 06/17/2025 at 2:50pm with Court Jackson.

## 2025-06-16 NOTE — TELEPHONE ENCOUNTER
Patient has an upcoming appointment in the PodiatryDepartment with Dr. GUS Jackson. They are currently in need of a referral, please send including multiple visits.    Future Appointments   Date Time Provider Department Center   6/17/2025  2:50 PM Court Jackson, [facility]   7/2/2025 11:00 AM [facility] SCHEDULED RESOURCE [facility] LAB [facility]   9/29/2025  1:15 PM Scarlet Talamantes, PT [facility] PT [facility]   10/2/2025  1:15 PM Scarlet Talamantes, PT [facility] PT [facility]   10/6/2025  2:00 PM Scarlet Talamantes, PT [facility] PT [facility]   10/9/2025  2:00 PM Scarlet Talamantes, PT [facility] PT [facility]   10/13/2025  2:00 PM Scarlet Talamantes, PT [facility] PT [facility]   10/16/2025  2:00 PM Scarlet Talamantes, PT [facility] PT [facility]   10/20/2025  2:00 PM Scarlet Talamantes, PT [facility] PT [facility]   10/23/2025  2:00 PM Scarlet Talamantes, PT [facility] PT [facility]   10/27/2025  2:00 PM Scarlet Talamantes, PT [facility] PT [facility]   10/30/2025  2:00 PM Scarlet Talamantes, PT [facility] PT [facility]   11/3/2025  2:00 PM Scarlet Talamantes, PT [facility] PT [facility]   11/6/2025  2:00 PM Scarlet Talamantes, PT [facility] PT [facility]   12/3/2025 11:30 AM [facility] US RM5 [facility] US [facility]   4/15/2026  9:00 AM Symone Quinn MD [facility]        Thank you

## 2025-06-17 ENCOUNTER — OFFICE VISIT (OUTPATIENT)
Dept: PODIATRY CLINIC | Facility: CLINIC | Age: 80
End: 2025-06-17

## 2025-06-17 DIAGNOSIS — M79.671 PAIN IN BOTH FEET: Primary | ICD-10-CM

## 2025-06-17 DIAGNOSIS — L84 CALLUS OF FOOT: ICD-10-CM

## 2025-06-17 DIAGNOSIS — M79.672 PAIN IN BOTH FEET: Primary | ICD-10-CM

## 2025-06-17 DIAGNOSIS — Q66.70 PES CAVUS: ICD-10-CM

## 2025-06-17 PROCEDURE — 99213 OFFICE O/P EST LOW 20 MIN: CPT | Performed by: STUDENT IN AN ORGANIZED HEALTH CARE EDUCATION/TRAINING PROGRAM

## 2025-06-17 PROCEDURE — 1159F MED LIST DOCD IN RCRD: CPT | Performed by: STUDENT IN AN ORGANIZED HEALTH CARE EDUCATION/TRAINING PROGRAM

## 2025-06-17 NOTE — PROGRESS NOTES
[facility] Podiatry  Progress Note      Kendal Abrams is a 80 year old female.   Chief Complaint   Patient presents with    Callus     Bilateral foot f/u-              HPI:   Pleasant 80-year-old female presents to clinic for bilateral foot evaluation.  Patient admits to calluses to bilateral forefoot.  Admits that she has also been using the pumice stone at home.     Allergies: Patient has no known allergies.    Current Outpatient Medications   Medication Sig Dispense Refill    atorvastatin 20 MG Oral Tab Take 1 tablet (20 mg total) by mouth every evening. 90 tablet 3    aspirin 81 MG Oral Tab EC Take 1 tablet (81 mg total) by mouth in the morning.      omeprazole 20 MG Oral Capsule Delayed Release Take 1 capsule (20 mg total) by mouth every morning before breakfast.      Vitamin D3 1000 units Oral Tab Take 1 tablet (1,000 Units total) by mouth in the morning.        Past Medical History:    Aortic atherosclerosis    CXR 1-18    Breast CA (HCC)    right dcis 2021    Chronic kidney disease, stage III (moderate) (HCC)    Deep vein thrombosis (HCC)    RLE, treated with Coumadin for 6 months    Ductal carcinoma in situ (DCIS) of right breast    Ductal carcinoma in situ, low nuclear grade, solid and cribriform type, with focal central necrosis and associated microcalcifications, multifocal, largest focus is 4.5 mm in greatest dimension; s/p right total mastectomy and right sentinel lymph node biopsy, right lymphoscintigraphy 3-21    GERD (gastroesophageal reflux disease)    EGD 11-16 with hiatal hernia and gastritis with biopsies benign and H. pylori testing negative    History of colon polyps    Hypercholesterolemia    Osteoarthritis of right knee    Status post right TKA 10-20    Varicose veins of both lower extremities    Visual impairment    readers      Past Surgical History:   Procedure Laterality Date    Biopsy of breast, needle core Left 01/24/2008    Fibroadenoma    Biopsy of breast, needle core Right  02/10/2021    cork clip    Biopsy of skin lesion  2018    upper mid back epidermal inclusion cyst    Cataract Right 2019    Cataract Left 2019    Colonoscopy      [facility]    Colonoscopy N/A 2023    Procedure: COLONOSCOPY;  Surgeon: Hugo Torres MD;  Location: [facility] ENDOSCOPY    Egd N/A 2016    Procedure: ESOPHAGOGASTRODUODENOSCOPY (EGD);  Surgeon: Lindsay Solorzano MD;  Location: [facility] ENDOSCOPY    Hemorrhoidectomy      Knee arthroscopy Right     Mastectomy right      Mastectomy,simple Right 2021    Salpingectomy Left     Ectopic pregnancy    Sent lymph node biopsy Right 2021    Stab phlebectomy, varicose veins, 1 extremity; <20 incisions Right     Total knee replacement Right 10/09/2020      Family History   Problem Relation Age of Onset    Colon Cancer Father          age 35    Hypertension Mother     Glaucoma Mother     Diabetes Sister     Hypertension Sister     Glaucoma Sister     Breast Cancer Niece 50    Cancer Niece         thyroid cancer    Breast Cancer Self 76    Macular degeneration Neg     Ovarian Cancer Neg       Social History     Socioeconomic History    Marital status: Single    Number of children: 0   Occupational History    Occupation: Nurse - dialysis, OR     Comment: retired   Tobacco Use    Smoking status: Former     Current packs/day: 0.00     Average packs/day: 0.5 packs/day for 2.0 years (1.0 ttl pk-yrs)     Types: Cigarettes     Start date: 1973     Quit date: 1975     Years since quittin.6    Smokeless tobacco: Never   Vaping Use    Vaping status: Never Used   Substance and Sexual Activity    Alcohol use: Yes     Comment: Occasional    Drug use: No           REVIEW OF SYSTEMS:     Denies nause, fever, chills  No calf pain  Denies chest pain or SOB      EXAM:   There were no vitals taken for this visit.  GENERAL: well developed, well nourished, in no apparent distress  EXTREMITIES:   1.  Integument: Normal skin temperature and turgor.  HPKs to bety plantar feet.  2. Vascular: Dorsalis pedis two out of four bilateral and posterior tibial pulses two out of   four bilateral, capillary refill normal.   3. Musculoskeletal: All muscle groups are graded 5 out of 5 in the foot and ankle.   4. Neurological: Normal sharp dull sensation; reflexes normal.             ASSESSMENT AND PLAN:   Diagnoses and all orders for this visit:    Pain in both feet    Pes cavus    Callus of foot            Plan:       -Patient examined, chart history reviewed.  -Discussed importance of proper pedal hygiene, regular foot checks, and tight glucose control.  -pared HPK x 6  to bety feet with sterile # 15 blade with no issues  -Ambulate with supportive shoes and inserts and avoid walking barefoot.  -Educated patient on acute signs of infection advised patient to seek immediate medical attention if symptoms arise.    RTC in 2 months      The patient indicates understanding of these issues and agrees to the plan.        Court Jackson DPM

## 2025-07-02 ENCOUNTER — TELEPHONE (OUTPATIENT)
Dept: CASE MANAGEMENT | Age: 80
End: 2025-07-02

## 2025-07-02 ENCOUNTER — LAB ENCOUNTER (OUTPATIENT)
Dept: LAB | Facility: HOSPITAL | Age: 80
End: 2025-07-02
Attending: INTERNAL MEDICINE
Payer: MEDICARE

## 2025-07-02 DIAGNOSIS — M25.562 LEFT KNEE PAIN, UNSPECIFIED CHRONICITY: Primary | ICD-10-CM

## 2025-07-02 DIAGNOSIS — D72.819 LEUKOPENIA, UNSPECIFIED TYPE: ICD-10-CM

## 2025-07-02 DIAGNOSIS — N18.30 STAGE 3 CHRONIC KIDNEY DISEASE, UNSPECIFIED WHETHER STAGE 3A OR 3B CKD (HCC): ICD-10-CM

## 2025-07-02 LAB
ANION GAP SERPL CALC-SCNC: 6 MMOL/L (ref 0–18)
BASOPHILS # BLD AUTO: 0.02 X10(3) UL (ref 0–0.2)
BASOPHILS NFR BLD AUTO: 0.5 %
BUN BLD-MCNC: 13 MG/DL (ref 9–23)
BUN/CREAT SERPL: 9.6 (ref 10–20)
CALCIUM BLD-MCNC: 8.9 MG/DL (ref 8.7–10.4)
CHLORIDE SERPL-SCNC: 106 MMOL/L (ref 98–112)
CO2 SERPL-SCNC: 28 MMOL/L (ref 21–32)
CREAT BLD-MCNC: 1.36 MG/DL (ref 0.55–1.02)
DEPRECATED RDW RBC AUTO: 45.2 FL (ref 35.1–46.3)
EGFRCR SERPLBLD CKD-EPI 2021: 39 ML/MIN/1.73M2 (ref 60–?)
EOSINOPHIL # BLD AUTO: 0.09 X10(3) UL (ref 0–0.7)
EOSINOPHIL NFR BLD AUTO: 2.1 %
ERYTHROCYTE [DISTWIDTH] IN BLOOD BY AUTOMATED COUNT: 13.2 % (ref 11–15)
FASTING STATUS PATIENT QL REPORTED: YES
GLUCOSE BLD-MCNC: 94 MG/DL (ref 70–99)
HCT VFR BLD AUTO: 38 % (ref 35–48)
HGB BLD-MCNC: 12.7 G/DL (ref 12–16)
IMM GRANULOCYTES # BLD AUTO: 0.01 X10(3) UL (ref 0–1)
IMM GRANULOCYTES NFR BLD: 0.2 %
LYMPHOCYTES # BLD AUTO: 1.09 X10(3) UL (ref 1–4)
LYMPHOCYTES NFR BLD AUTO: 25.8 %
MCH RBC QN AUTO: 30.7 PG (ref 26–34)
MCHC RBC AUTO-ENTMCNC: 33.4 G/DL (ref 31–37)
MCV RBC AUTO: 91.8 FL (ref 80–100)
MONOCYTES # BLD AUTO: 0.45 X10(3) UL (ref 0.1–1)
MONOCYTES NFR BLD AUTO: 10.7 %
NEUTROPHILS # BLD AUTO: 2.56 X10 (3) UL (ref 1.5–7.7)
NEUTROPHILS # BLD AUTO: 2.56 X10(3) UL (ref 1.5–7.7)
NEUTROPHILS NFR BLD AUTO: 60.7 %
OSMOLALITY SERPL CALC.SUM OF ELEC: 290 MOSM/KG (ref 275–295)
PLATELET # BLD AUTO: 193 10(3)UL (ref 150–450)
POTASSIUM SERPL-SCNC: 4.4 MMOL/L (ref 3.5–5.1)
RBC # BLD AUTO: 4.14 X10(6)UL (ref 3.8–5.3)
SODIUM SERPL-SCNC: 140 MMOL/L (ref 136–145)
WBC # BLD AUTO: 4.2 X10(3) UL (ref 4–11)

## 2025-07-02 PROCEDURE — 80048 BASIC METABOLIC PNL TOTAL CA: CPT

## 2025-07-02 PROCEDURE — 85025 COMPLETE CBC W/AUTO DIFF WBC: CPT

## 2025-07-02 PROCEDURE — 36415 COLL VENOUS BLD VENIPUNCTURE: CPT

## 2025-08-05 ENCOUNTER — OFFICE VISIT (OUTPATIENT)
Facility: CLINIC | Age: 80
End: 2025-08-05

## 2025-08-05 VITALS
DIASTOLIC BLOOD PRESSURE: 61 MMHG | BODY MASS INDEX: 28 KG/M2 | WEIGHT: 181 LBS | SYSTOLIC BLOOD PRESSURE: 111 MMHG | HEART RATE: 61 BPM

## 2025-08-05 DIAGNOSIS — R93.41 ABNORMAL RADIOLOGIC FINDINGS ON DIAGNOSTIC IMAGING OF RENAL PELVIS, URETER, OR BLADDER: ICD-10-CM

## 2025-08-05 DIAGNOSIS — N18.32 CKD STAGE 3B, GFR 30-44 ML/MIN (HCC): Primary | ICD-10-CM

## 2025-08-05 PROCEDURE — 1160F RVW MEDS BY RX/DR IN RCRD: CPT | Performed by: INTERNAL MEDICINE

## 2025-08-05 PROCEDURE — 1126F AMNT PAIN NOTED NONE PRSNT: CPT | Performed by: INTERNAL MEDICINE

## 2025-08-05 PROCEDURE — 99204 OFFICE O/P NEW MOD 45 MIN: CPT | Performed by: INTERNAL MEDICINE

## 2025-08-05 PROCEDURE — 3074F SYST BP LT 130 MM HG: CPT | Performed by: INTERNAL MEDICINE

## 2025-08-05 PROCEDURE — 3078F DIAST BP <80 MM HG: CPT | Performed by: INTERNAL MEDICINE

## 2025-08-05 PROCEDURE — 1159F MED LIST DOCD IN RCRD: CPT | Performed by: INTERNAL MEDICINE

## 2025-08-14 ENCOUNTER — MED REC SCAN ONLY (OUTPATIENT)
Dept: INTERNAL MEDICINE CLINIC | Facility: CLINIC | Age: 80
End: 2025-08-14

## (undated) DIAGNOSIS — Z01.00 EYE EXAM, ROUTINE: Primary | ICD-10-CM

## (undated) DIAGNOSIS — Z00.00 ANNUAL PHYSICAL EXAM: Primary | ICD-10-CM

## (undated) DIAGNOSIS — D05.11 DUCTAL CARCINOMA IN SITU (DCIS) OF RIGHT BREAST: Primary | ICD-10-CM

## (undated) DEVICE — Device: Brand: STABLECUT®

## (undated) DEVICE — SUTURE VICRYL 2-0 FS-1

## (undated) DEVICE — VIOLET BRAIDED (POLYGLACTIN 910), SYNTHETIC ABSORBABLE SUTURE: Brand: COATED VICRYL

## (undated) DEVICE — 3M™ STERI-DRAPE™ U-DRAPE 1015: Brand: STERI-DRAPE™

## (undated) DEVICE — KIT CLEAN ENDOKIT 1.1OZ GOWNX2

## (undated) DEVICE — SMOOTH PINS PACK: Brand: KNEE INSTRUMENTS

## (undated) DEVICE — CHLORAPREP 26ML APPLICATOR

## (undated) DEVICE — HOOD: Brand: FLYTE

## (undated) DEVICE — DERMABOND LIQUID ADHESIVE

## (undated) DEVICE — DRAIN RESERVOIR RELIAVAC 100CC

## (undated) DEVICE — STERILE LATEX POWDER-FREE SURGICAL GLOVESWITH NITRILE COATING: Brand: PROTEXIS

## (undated) DEVICE — DRESSING 10X4IN ANMC SAFETAC

## (undated) DEVICE — DRAPE SHEET LG

## (undated) DEVICE — COTTON ROLL: Brand: DEROYAL

## (undated) DEVICE — UNDYED BRAIDED (POLYGLACTIN 910), SYNTHETIC ABSORBABLE SUTURE: Brand: COATED VICRYL

## (undated) DEVICE — 60 ML SYRINGE LUER-LOCK TIP: Brand: MONOJECT

## (undated) DEVICE — 2T11 #2 PDO 36 X 36: Brand: 2T11 #2 PDO 36 X 36

## (undated) DEVICE — MINOR GENERAL: Brand: MEDLINE INDUSTRIES, INC.

## (undated) DEVICE — MYKNEE PATIENT SPECIFIC GUIDES

## (undated) DEVICE — BANDAGE FLXMSTR 11YDX6IN STRL

## (undated) DEVICE — DRAIN SILICONE FLAT 10MM

## (undated) DEVICE — CONTAINER SPEC STR 4OZ GRY LID

## (undated) DEVICE — SUTURE VICRYL 3-0 SH

## (undated) DEVICE — SOL  .9 1000ML BTL

## (undated) DEVICE — MEDI-VAC NON-CONDUCTIVE SUCTION TUBING: Brand: CARDINAL HEALTH

## (undated) DEVICE — TOTAL KNEE: Brand: MEDLINE INDUSTRIES, INC.

## (undated) DEVICE — SPONGE: SPECIALTY PEANUT XR 100/CS: Brand: MEDICAL ACTION INDUSTRIES

## (undated) DEVICE — GAUZE SPONGES,12 PLY: Brand: CURITY

## (undated) DEVICE — MEDI-VAC NON-CONDUCTIVE SUCTION TUBING 6MM X 1.8M (6FT.) L: Brand: CARDINAL HEALTH

## (undated) DEVICE — CAUTERY BLADE 2IN INS E1455

## (undated) DEVICE — BATTERY

## (undated) DEVICE — ENCORE® LATEX MICRO SIZE 8, STERILE LATEX POWDER-FREE SURGICAL GLOVE: Brand: ENCORE

## (undated) DEVICE — MAJOR GENERAL: Brand: MEDLINE INDUSTRIES, INC.

## (undated) DEVICE — 3.5 FEMALE HEX HEAD PIN-Z

## (undated) DEVICE — SUTURE SILK 2-0 FS

## (undated) DEVICE — WRAP COOLING KNEE W/ICE PILLOW

## (undated) DEVICE — GAMMEX® NON-LATEX PI ORTHO SIZE 9, STERILE POLYISOPRENE POWDER-FREE SURGICAL GLOVE: Brand: GAMMEX

## (undated) DEVICE — GAMMEX® NON-LATEX PI ORTHO SIZE 8.5, STERILE POLYISOPRENE POWDER-FREE SURGICAL GLOVE: Brand: GAMMEX

## (undated) DEVICE — Device: Brand: DUAL NARE NASAL CANNULAE FEMALE LUER CON 7FT O2 TUBE

## (undated) DEVICE — CEMENT MIXING SYSTEM WITH FEMORAL BREAKWAY NOZZLE: Brand: REVOLUTION

## (undated) DEVICE — SNARE ENDOSCOPIC 10MM ROUND

## (undated) DEVICE — ENCORE® LATEX ACCLAIM SIZE 8, STERILE LATEX POWDER-FREE SURGICAL GLOVE: Brand: ENCORE

## (undated) DEVICE — TRI FLAT PIN-MED

## (undated) DEVICE — FORCEPS BX L240CM DIA2.4MM L NDL RAD JAW 4

## (undated) DEVICE — CLIP SM INTNL HMCLP TNTLM ESCP

## (undated) DEVICE — ACCUVAC SMOKE ATTACHMENT

## (undated) DEVICE — NEEDLE SPINAL 20X3-1/2 YELLOW

## (undated) DEVICE — IMPERVIOUS STOCKINETTE: Brand: DEROYAL

## (undated) DEVICE — GAMMEX® PI HYBRID SIZE 9, STERILE POWDER-FREE SURGICAL GLOVE, POLYISOPRENE AND NEOPRENE BLEND: Brand: GAMMEX

## (undated) DEVICE — BLADE SAW SAGITTAL 19.5

## (undated) DEVICE — KIT ENDO ORCAPOD 160/180/190

## (undated) DEVICE — 2DE14 2-0 PDO 24 X 24: Brand: 2DE14 2-0 PDO 24 X 24

## (undated) DEVICE — BANDAGE ROLL,100% COTTON, 6 PLY, LARGE: Brand: KERLIX

## (undated) DEVICE — SOL  .9 3000ML

## (undated) DEVICE — 60 ML SYRINGE REGULAR TIP: Brand: MONOJECT

## (undated) DEVICE — SNARE OPTMZ PLPCTM TRP

## (undated) NOTE — LETTER
[address]  [address] [place], [place]  Authorization for Surgical Operation and Procedure                                                                                           I hereby authorize Saira Nova MD, my physician and his/her assistants (if applicable), which may include medical students, residents, and/or fellows, to perform the following surgical operation/ procedure and administer such anesthesia as may be determined necessary by my physician: Operation/Procedure name (s) COLONOSCOPY on Paddy Rupinder   2. I recognize that during the surgical operation/procedure, unforeseen conditions may necessitate additional or different procedures than those listed above. I, therefore, further authorize and request that the above-named surgeon, assistants, or designees perform such procedures as are, in their judgment, necessary and desirable. 3.   My surgeon/physician has discussed prior to my surgery the potential benefits, risks and side effects of this procedure; the likelihood of achieving goals; and potential problems that might occur during recuperation. They also discussed reasonable alternatives to the procedure, including risks, benefits, and side effects related to the alternatives and risks related to not receiving this procedure. I have had all my questions answered and I acknowledge that no guarantee has been made as to the result that may be obtained. 4.   Should the need arise during my operation/procedure, which includes change of level of care prior to discharge, I also consent to the administration of blood and/or blood products. Further, I understand that despite careful testing and screening of blood or blood products by collecting agencies, I may still be subject to ill effects as a result of receiving a blood transfusion and/or blood products.   The following are some, but not all, of the potential risks that can occur: fever and allergic reactions, hemolytic reactions, transmission of diseases such as Hepatitis, AIDS and Cytomegalovirus (CMV) and fluid overload. In the event that I wish to have an autologous transfusion of my own blood, or a directed donor transfusion, I will discuss this with my physician. Check only if Refusing Blood or Blood Products  I understand refusal of blood or blood products as deemed necessary by my physician may have serious consequences to my condition to include possible death. I hereby assume responsibility for my refusal and release the hospital, its personnel, and my physicians from any responsibility for the consequences of my refusal.    o  Refuse   5. I authorize the use of any specimen, organs, tissues, body parts or foreign objects that may be removed from my body during the operation/procedure for diagnosis, research or teaching purposes and their subsequent disposal by hospital authorities. I also authorize the release of specimen test results and/or written reports to my treating physician on the hospital medical staff or other referring or consulting physicians involved in my care, at the discretion of the Pathologist or my treating physician. 6.   I consent to the photographing or videotaping of the operations or procedures to be performed, including appropriate portions of my body for medical, scientific, or educational purposes, provided my identity is not revealed by the pictures or by descriptive texts accompanying them. If the procedure has been photographed/videotaped, the surgeon will obtain the original picture, image, videotape or CD. The hospital will not be responsible for storage, release or maintenance of the picture, image, tape or CD.    7.   I consent to the presence of a  or observers in the operating room as deemed necessary by my physician or their designees.     8.   I recognize that in the event my procedure results in extended X-Ray/fluoroscopy time, I may develop a skin reaction. 9. If I have a Do Not Attempt Resuscitation (DNAR) order in place, that status will be suspended while in the operating room, procedural suite, and during the recovery period unless otherwise explicitly stated by me (or a person authorized to consent on my behalf). The surgeon or my attending physician will determine when the applicable recovery period ends for purposes of reinstating the DNAR order. 10. Patients having a sterilization procedure: I understand that if the procedure is successful the results will be permanent and it will therefore be impossible for me to inseminate, conceive, or bear children. I also understand that the procedure is intended to result in sterility, although the result has not been guaranteed. 11. I acknowledge that my physician has explained sedation/analgesia administration to me including the risk and benefits I consent to the administration of sedation/analgesia as may be necessary or desirable in the judgment of my physician. I CERTIFY THAT I HAVE READ AND FULLY UNDERSTAND THE ABOVE CONSENT TO OPERATION and/or OTHER PROCEDURE.     _________________________________________ _________________________________     ___________________________________  Signature of Patient     Signature of Responsible Person                   Printed Name of Responsible Person                              _________________________________________ ______________________________        ___________________________________  Signature of Witness         Date  Time         Relationship to Patient    STATEMENT OF PHYSICIAN My signature below affirms that prior to the time of the procedure; I have explained to the patient and/or his/her legal representative, the risks and benefits involved in the proposed treatment and any reasonable alternative to the proposed treatment.  I have also explained the risks and benefits involved in refusal of the proposed treatment and alternatives to the proposed treatment and have answered the patient's questions.  If I have a significant financial interest in a co-management agreement or a significant financial interest in any product or implant, or other significant relationship used in this procedure/surgery, I have disclosed this and had a discussion with my patient.     _______________________________________________________________ _____________________________  Aldon Kawasaki  )                                                                                         (Date)                                   (Time)  Patient Name: Sharmila Daly    : 1945   Printed: 11/15/2023      Medical Record #: [id number]                                              Page 1 of 1

## (undated) NOTE — MR AVS SNAPSHOT
[place]  [address]  [phone]               Thank you for choosing us for your health care visit with Audrey Wilhelm MD.  We are glad to serve you and happy to provide you with this summary authorization numbers or be assured that none are required. You can then schedule your appointment. Failure to obtain required authorization numbers can create reimbursement difficulties for you.     Assoc Dx:  --          Reason for Today's Visit     Physi medical emergencies, dial 911. Visit [email or web address] to learn more. Educational Information     Your blood pressure indicates you may be at-risk for Hypertension. Please consider the following Lifestyle Modifications.   Also, please

## (undated) NOTE — LETTER
Patient: Sam Florentino   YOB: 1945   Date of Visit: 3/19/2021     Dear  Dr. Mari Hodge MD,    Thank you for referring Sam Florentino to my practice. Please find my assessment and plan below.       Ductal carcinoma in situ (DCIS) of right mynor

## (undated) NOTE — LETTER
[address] [place] [address]  Authorization for Surgical Operation or Procedure  Date: ______________       Time: _______________  1.  I hereby authorize Dr. Paola Perry , my physician and the assistant, to perform the following operation a 5. I consent to the photographing of the operations or procedures to be performed for the purposes of advancing medicine, science, and/or education, provided my identity is not revealed.  If the procedure has been videotaped, the physician/surgeon will obta Statement of Physician: My signature below affirms that prior to the time of the procedure, I have explained to the patient and/or her guardian, the risks and benefits involved in the proposed treatment and any reasonable alternative to the proposed treatm

## (undated) NOTE — ED AVS SNAPSHOT
Yulissa Diehl   MRN: [id number]    Department:  [facility] Emergency Department   Date of Visit:  1/6/2018           Disclosure     Insurance plans vary and the physician(s) referred by the ER may not be covered by your plan.  Please contact you within the next three months to obtain basic health screening including reassessment of your blood pressure.     IF THERE IS ANY CHANGE OR WORSENING OF YOUR CONDITION, CALL YOUR PRIMARY CARE PHYSICIAN AT ONCE OR RETURN IMMEDIATELY TO THE EMERGENCY DEPARTMEN

## (undated) NOTE — LETTER
Rosalba Miller, [address] [facility]       06/14/18        Patient: Castillo Benitez   YOB: 1945   Date of Visit: 6/14/2018       Dear  Dr. Ivelisse Rain MD,      Thank you for referring Castillo Benitez to my practice.   Harika

## (undated) NOTE — LETTER
10/24/18        Kendal Mtz  [address] [facility]      Dear Berta Molina,    Our records indicate that you have outstanding lab work and or testing that was ordered for you and has not yet been completed:  Orders Placed This Encounter      Mammo

## (undated) NOTE — LETTER
February 27, 2018    Brenda Paulson MD  [facility]     Patient: Omer Gibson   YOB: 1945   Date of Visit: 2/27/2018       Dear Dr. Arturo Costa MD:    Thank you for referring Omer Gibson to me for evaluation.  He Types: Cigarettes     Quit date: 11/17/1975  Smokeless tobacco: Never Used                      Alcohol use: Yes           0.0 oz/week     Comment: Occasional      Medications:    Current Outpatient Prescriptions:  azithromycin 250 MG Oral Tab Take two Conjunctiva/Sclera Nasal pinguecula Nasal/temp pinguecula    Cornea Clear Clear    Anterior Chamber Deep and quiet Deep and quiet    Iris Normal Normal    Lens 2+ Nuclear sclerosis, 2+ Cortical cataract 2+ Nuclear sclerosis, 3+ Cortical cataract    Vitreo to following Kendal along with you.     Sincerely,        Prema Menjivar MD        CC: No Recipients    Document electronically generated by: Prema Menjivar

## (undated) NOTE — LETTER
AUTHORIZATION FOR SURGICAL OPERATION OR OTHER PROCEDURE    1.  I hereby authorize Clotilde Streeter and [facility] staff assigned to my case to perform the following operation and/or procedure at the [facility]:    __________________________________ Time:  ________ A. M.  P.M.        Patient Name:  ______________________________________________________  (please print)      Patient signature:  ___________________________________________________             Relationship to Patient:           []  Chirag Joness

## (undated) NOTE — LETTER
AUTHORIZATION FOR SURGICAL OPERATION OR OTHER PROCEDURE    1.  I hereby authorize Dr. Lucas Brown  and [facility] staff assigned to my case to perform the following operation and/or procedure at the [facility]:    Ortho Visc injection #4 Time:  ________ A. M.  P.M.        Patient Name:  ______________________________________________________  (please print)      Patient signature:  ___________________________________________________             Relationship to Patient:

## (undated) NOTE — LETTER
Patient: Elizabeth Jain   YOB: 1945   Date of Visit: 2/17/2021     Dear  Dr. Oracio Calvo MD,    Thank you for referring Elizabeth Jain to my practice. Please find my assessment and plan below.           Ductal carcinoma in situ (dcis) of right

## (undated) NOTE — LETTER
July 11, 2017         John Oropeza MD  [facility] [address]      Patient: Princess Vasquez   YOB: 1945   Date of Visit: 7/11/2017       Dear Dr. Karen Dowd MD,    I saw your patient, Princess Vasquez, on 7/11/2017.  Enclosed

## (undated) NOTE — LETTER
AUTHORIZATION FOR SURGICAL OPERATION OR OTHER PROCEDURE    1.  I hereby authorize BAILEE Patrick, and [facility] staff assigned to my case to perform the following operation and/or procedure at the [place] [address] Time:  ________ A. M.  P.M.        Patient Name:  ______________________________________________________  (please print)      Patient signature:  ___________________________________________________             Relationship to Patient:

## (undated) NOTE — LETTER
AUTHORIZATION FOR SURGICAL OPERATION OR OTHER PROCEDURE    1. I hereby authorize Dr. Cho, and [facility] staff assigned to my case to perform the following operation and/or procedure at the [facility] site:    _________________________________Left knee cortisone injection  ______________________________________________________________      _______________________________________________________________________________________________    2.  My physician has explained the nature and purpose of the operation or other procedure, possible alternative methods of treatment, the risks involved, and the possibility of complication to me.  I acknowledge that no guarantee has been made as to the result that may be obtained.  3.  I recognize that, during the course of this operation, or other procedure, unforseen conditions may necessitate additional or different procedure than those listed above.  I, therefore, further authorize and request that the above named physician, his/her physician assistants or designees perform such procedures as are, in his/her professional opinion, necessary and desirable.  4.  Any tissue or organs removed in the operation or other procedure may be disposed of by and at the discretion of the [facility] and [facility].  5.  I understand that in the event of a medical emergency, I will be transported by local paramedics to [facility] or other hospital emergency department.  6.  I certify that I have read and fully understand the above consent to operation and/or other procedure.    7.  I acknowledge that my physician has explained sedation/analgesia administration to me including the risks and benefits.  I consent to the administration of sedation/analgesia as may be necessary or desirable in the judgement of my physician.    Witness signature: ___________________________________________________ Date:  ______/______/_____                     Time:  ________ A.M.  P.M.       Patient Name:  ______________________________________________________  (please print)      Patient signature:  ___________________________________________________             Relationship to Patient:           []  Parent    Responsible person                          []  Spouse  In case of minor or                    [] Other  _____________   Incompetent name:  __________________________________________________                               (please print)      _____________      Responsible person  In case of minor or  Incompetent signature:  _______________________________________________    Statement of Physician  My signature below affirms that prior to the time of the procedure, I have explained to the patient and/or his/her guardian, the risks and benefits involved in the proposed treatment and any reasonable alternative to the proposed treatment.  I have also explained the risks and benefits involved in the refusal of the proposed treatment and have answered the patient's questions.                        Date:  ______/______/_______  Provider                      Signature:  __________________________________________________________       Time:  ___________ A.M    P.M.

## (undated) NOTE — LETTER
AUTHORIZATION FOR SURGICAL OPERATION OR OTHER PROCEDURE    1.  I hereby authorize Dr. Reid Tafoya  and [facility] staff assigned to my case to perform the following operation and/or procedure at the [facility]:     Ortho Visc injection #3 Time:  ________ A. M.  P.M.        Patient Name:  ______________________________________________________  (please print)      Patient signature:  ___________________________________________________             Relationship to Patient:

## (undated) NOTE — MR AVS SNAPSHOT
[address] [facility]  [address] [place] [id number] [phone]               Thank you for choosing us for your health care visit with Cassy Lamsa MD.  We are glad to serve you and happy to provide you with this summary of your visit. [address]  [address]  [address] [place] [address]    Mitra Crawford [address]  [address] [place]    It is the patient's responsibility to check with and follow their insurance Educational Information     Healthy Diet and Regular Exercise  [employer] [facility] [employer] for making healthy food choices  -   Enjoy your food, but eat less. Fully enjoy your food when eating. Don’t eat while distracted and slow down.

## (undated) NOTE — LETTER
AUTHORIZATION FOR SURGICAL OPERATION OR OTHER PROCEDURE    1.  I hereby authorize [facility]AI[facility]E Ying, [facility] staff assigned to my case to perform the following operation and/or procedure at the [facility]:    ___________________________ Patient Name:  ______________________________________________________  (please print)      Patient signature:  ___________________________________________________             Relationship to Patient:           []  Parent    Responsible person

## (undated) NOTE — Clinical Note
April 25, 2017         Bard Larissa MD  [facility] [address]      Patient: Brant Guadarrama   YOB: 1945   Date of Visit: 4/24/2017     Dear Regina Delgadillo:    I had the pleasure of seeing Brant Guadarrama in the office today.  Thank HDL 60. Electrolytes, sodium 141, K 5.2, chloride 109, bicarb 27, BUN 16, creatinine 1.34. GFR about 50. Liver functions are good. Last TSH 0.48.  Ultrasound done of her kidneys in September 2016, liver has some hemangiomas, gallbladder looks okay, pancreas